# Patient Record
Sex: MALE | Race: BLACK OR AFRICAN AMERICAN | Employment: FULL TIME | URBAN - METROPOLITAN AREA
[De-identification: names, ages, dates, MRNs, and addresses within clinical notes are randomized per-mention and may not be internally consistent; named-entity substitution may affect disease eponyms.]

---

## 2018-05-31 ENCOUNTER — OFFICE VISIT (OUTPATIENT)
Dept: FAMILY MEDICINE CLINIC | Age: 33
End: 2018-05-31

## 2018-05-31 VITALS
HEART RATE: 77 BPM | SYSTOLIC BLOOD PRESSURE: 121 MMHG | TEMPERATURE: 98.3 F | WEIGHT: 168 LBS | OXYGEN SATURATION: 99 % | HEIGHT: 75 IN | BODY MASS INDEX: 20.89 KG/M2 | DIASTOLIC BLOOD PRESSURE: 82 MMHG | RESPIRATION RATE: 16 BRPM

## 2018-05-31 DIAGNOSIS — Z00.00 REGULAR CHECK-UP: Primary | ICD-10-CM

## 2018-05-31 DIAGNOSIS — E10.9 TYPE 1 DIABETES MELLITUS WITHOUT COMPLICATION (HCC): ICD-10-CM

## 2018-05-31 DIAGNOSIS — H61.22 IMPACTED CERUMEN OF LEFT EAR: ICD-10-CM

## 2018-05-31 LAB — GLUCOSE POC: 241 MG/DL

## 2018-05-31 NOTE — MR AVS SNAPSHOT
[facility] [address] 
[phone] Patient: Chester Wheeler MRN: [id number] Visit Information Date & Time Provider Department Dept. Phone Encounter #  
 5/31/2018 11:00 AM Lorrie Ro  Femi  [id number] Follow-up Instructions Return in about 2 months (around 7/31/2018) for Diabetes. Your Appointments 6/7/2018  1:15 PM  
Nurse Visit with NURSE_ARMANDO_HR Andrew Humph[facility] Appt Note: [facility] [place]  
  
   
 [facility] [address] Upcoming Health Maintenance Date Due DTaP/Tdap/Td series (1 - Tdap) 5/6/2004 Influenza Age 5 to Adult 8/1/2018 Allergies as of 5/31/2018  Review Complete On: 5/31/2018 By: Melinda Hinton No Known Allergies Current Immunizations  Never Reviewed No immunizations on file. Not reviewed this visit You Were Diagnosed With   
  
 Codes Comments Regular check-up    -  Primary ICD-10-CM: Z00.00 ICD-9-CM: V70.0 Type 1 diabetes mellitus without complication (HCC)     MAW-66-SO: E10.9 ICD-9-CM: 250.01 Impacted cerumen of left ear     ICD-10-CM: H61.22 
ICD-9-CM: 380.4 Vitals BP Pulse Temp Resp Height(growth percentile) Weight(growth percentile) 121/82 (BP 1 Location: Left arm, BP Patient Position: Sitting) 77 98.3 °F (36.8 °C) (Oral) 16 6' 3\" (1.905 m) 168 lb (76.2 kg) SpO2 BMI Smoking Status 99% 21 kg/m2 Current Every Day Smoker Vitals History BMI and BSA Data Body Mass Index Body Surface Area  
 21 kg/m 2 2.01 m 2 Your Updated Medication List  
  
   
This list is accurate as of 5/31/18 11:43 AM.  Always use your most recent med list.  
  
  
  
  
 carbamide peroxide 6.5 % otic solution Commonly known as:  [employer] Administer 5 Drops into each ear two (2) times a day. insulin  unit/mL injection Commonly known as:  NOVOLIN N, HUMULIN N  
by SubCUTAneous route once. NOVOLIN R INJECTION  
by Injection route. Prescriptions Printed Refills  
 carbamide peroxide (DEBROX) 6.5 % otic solution 1 Sig: Administer 5 Drops into each ear two (2) times a day. Class: Print Route: Both Ears We Performed the Following AMB POC GLUCOSE BLOOD, BY GLUCOSE MONITORING DEVICE [44647 CPT(R)] Follow-up Instructions Return in about 2 months (around 7/31/2018) for Diabetes. Introducing [facility] & HEALTH SERVICES! [facility] introduces [employer] patient portal. Now you can access parts of your medical record, email your doctor's office, and request medication refills online. 1. In your internet browser, go to [email or web address] 2. Click on the First Time User? Click Here link in the Sign In box. You will see the New Member Sign Up page. 3. Enter your [employer] Access Code exactly as it appears below. You will not need to use this code after youve completed the sign-up process. If you do not sign up before the expiration date, you must request a new code. · [employer] Access Code: [email or web address] Expires: 8/29/2018  9:25 AM 
 
4. Enter the last four digits of your Social Security Number (xxxx) and Date of Birth (mm/dd/yyyy) as indicated and click Submit. You will be taken to the next sign-up page. 5. Create a En[employer] ID. This will be your [employer] login ID and cannot be changed, so think of one that is secure and easy to remember. 6. Create a [employer] password. You can change your password at any time. 7. Enter your Password Reset Question and Answer. This can be used at a later time if you forget your password. 8. Enter your e-mail address. You will receive e-mail notification when new information is available in [address] 9. Click Sign Up. You can now view and download portions of your medical record. 10. Click the Download Summary menu link to download a portable copy of your medical information. If you have questions, please visit the Frequently Asked Questions section of the [employer] website. Remember, [employer] is NOT to be used for urgent needs. For medical emergencies, dial 911. Now available from your iPhone and Android! Please provide this summary of care documentation to your next provider. If you have any questions after today's visit, please call [phone].

## 2018-05-31 NOTE — PROGRESS NOTES
MARTHA Gill is a 26 y.o. male being seen today for   Chief Complaint   Patient presents with    Diabetes    Other     Pt stated his ear is clogged   . IOV for this pt with type one DM. He recently had a toe amputated and then got serious about his diabetes. His sugars have been great lately. rangin from . he states that he would be interested in getting back on lantus for diabetes and interested in rx program to defray costs. Also his left ear feels clogged. Past Medical History:   Diagnosis Date    Diabetes ([address] 1998         ROS  Patient states that he is feeling well. Denies complaints of chest pain, shortness of breath, swelling of legs, dizziness or weakness. he denies nausea, vomiting or diarrhea. Current Outpatient Prescriptions   Medication Sig    insulin regular, human (NOVOLIN R INJECTION) by Injection route.  insulin NPH (NOVOLIN N, HUMULIN N) 100 unit/mL injection by SubCUTAneous route once.  carbamide peroxide (DEBROX) 6.5 % otic solution Administer 5 Drops into each ear two (2) times a day. No current facility-administered medications for this visit. PE  Visit Vitals    /82 (BP 1 Location: Left arm, BP Patient Position: Sitting)    Pulse 77    Temp 98.3 °F (36.8 °C) (Oral)    Resp 16    Ht 6' 3\" (1.905 m)    Wt 168 lb (76.2 kg)    SpO2 99%    BMI 21 kg/m2        Alert and oriented with normal mood and affect. he is well developed and well nourished . Lungs are clear without wheezing. Heart rate is regular without murmurs or gallops. There is no lower extremity edema. Bilateral ear canals with cerumen    Results for orders placed or performed in visit on 05/31/18   AMB POC GLUCOSE BLOOD, BY GLUCOSE MONITORING DEVICE   Result Value Ref Range    Glucose  mg/dL         Assessment and Plan:        ICD-10-CM ICD-9-CM    1. Regular check-up Z00.00 V70.0 AMB POC GLUCOSE BLOOD, BY GLUCOSE MONITORING DEVICE   2.  Type 1 diabetes mellitus without complication ([address] K38.9 250.01    3.  Impacted cerumen of left ear  Daily debrox for cerumen then rtc for ear irrigation if sx persist H61.22 380.4        Follow up 2 mos for DM visit with labs  DM classes  PAP for lantus 20 and humalog SSI    Pola Gracia MD

## 2018-05-31 NOTE — PROGRESS NOTES
After Visit Summary for this encounter has been printed and given to patient. I have reviewed discharge instructions with the patient. The patient verbalized understanding. Guidance given regarding new medication(s) this visit, including reason for taking medicine, common side effects, and pharmacy medication was sent to if not printed. Pharmacy Assistance Program Application given to patient during encounter. Patient need assistance with Lantus 100 unit/mL 20 units by SC route daily and Humalog 100 unit/mL 25 units by SC route daily. Patient will return application with income verification.

## 2018-06-07 ENCOUNTER — CLINICAL SUPPORT (OUTPATIENT)
Dept: FAMILY MEDICINE CLINIC | Age: 33
End: 2018-06-07

## 2018-06-07 DIAGNOSIS — H61.22 IMPACTED CERUMEN OF LEFT EAR: Primary | ICD-10-CM

## 2018-06-07 NOTE — MR AVS SNAPSHOT
[address] [address] 
[phone] Patient: Eulogio Lombardi MRN: [id number] Visit Information Date & Time Provider Department Dept. Phone Encounter #  
 2018  1:15 PM NURSE_CVAN_HR [employer] [phone] [id number] Upcoming Health Maintenance Date Due HEMOGLOBIN A1C Q6M 1983 LIPID PANEL Q1 1983 FOOT EXAM Q1 1993 MICROALBUMIN Q1 1993 EYE EXAM RETINAL OR DILATED Q1 1993 Pneumococcal 19-64 Medium Risk (1 of 1 - PPSV23) 2002 DTaP/Tdap/Td series (1 - Tdap) 2004 Influenza Age 5 to Adult 2018 Allergies as of 2018  Review Complete On: 2018 By: Estelle Emmanuel No Known Allergies Current Immunizations  Never Reviewed No immunizations on file. Not reviewed this visit Vitals Smoking Status Current Every Day Smoker Your Updated Medication List  
  
   
This list is accurate as of 18  1:46 PM.  Always use your most recent med list.  
  
  
  
  
 carbamide peroxide 6.5 % otic solution Commonly known as:  [employer] Administer 5 Drops into each ear two (2) times a day. insulin  unit/mL injection Commonly known as:  NOVOLIN N, HUMULIN N  
by SubCUTAneous route once. NOVOLIN R INJECTION  
by Injection route. Patient Instructions Apply for [facility] by [phone] Introducing [facility] & HEALTH SERVICES! [employer] introduces [employer] patient portal. Now you can access parts of your medical record, email your doctor's office, and request medication refills online. 1. In your internet browser, go to [email or web address] [email or web address]/[employer] 2. Click on the First Time User? Click Here link in the Sign In box. You will see the New Member Sign Up page. 3. Enter your [employer] Access Code exactly as it appears below.  You will not need to use this code after youve completed the sign-up process. If you do not sign up before the expiration date, you must request a new code. · [employer] Access Code: [email or web address] Expires: 8/29/2018  9:25 AM 
 
4. Enter the last four digits of your Social Security Number (xxxx) and Date of Birth (mm/dd/yyyy) as indicated and click Submit. You will be taken to the next sign-up page. 5. Create a [employer] Online ID. This will be your [employer] Online login ID and cannot be changed, so think of one that is secure and easy to remember. 6. Create a [employer] Online password. You can change your password at any time. 7. Enter your Password Reset Question and Answer. This can be used at a later time if you forget your password. 8. Enter your e-mail address. You will receive e-mail notification when new information is available in [address] 9. Click Sign Up. You can now view and download portions of your medical record. 10. Click the Download Summary menu link to download a portable copy of your medical information. If you have questions, please visit the Frequently Asked Questions section of the [employer] website. Remember, [employer] is NOT to be used for urgent needs. For medical emergencies, dial 911. Now available from your iPhone and Android! Please provide this summary of care documentation to your next provider. If you have any questions after today's visit, please call [phone].

## 2018-06-07 NOTE — PATIENT INSTRUCTIONS
[facility] by [phone]

## 2018-08-16 ENCOUNTER — APPOINTMENT (OUTPATIENT)
Dept: GENERAL RADIOLOGY | Age: 33
DRG: 853 | End: 2018-08-16
Attending: EMERGENCY MEDICINE
Payer: SELF-PAY

## 2018-08-16 ENCOUNTER — HOSPITAL ENCOUNTER (INPATIENT)
Age: 33
LOS: 7 days | Discharge: HOME | DRG: 853 | End: 2018-08-23
Attending: EMERGENCY MEDICINE | Admitting: HOSPITALIST
Payer: SELF-PAY

## 2018-08-16 DIAGNOSIS — L03.115 CELLULITIS OF RIGHT FOOT: ICD-10-CM

## 2018-08-16 DIAGNOSIS — I96 GANGRENE OF TOE OF RIGHT FOOT (HCC): Primary | ICD-10-CM

## 2018-08-16 PROBLEM — M86.9 OSTEOMYELITIS (HCC): Status: ACTIVE | Noted: 2018-08-16

## 2018-08-16 LAB
ALBUMIN SERPL-MCNC: 2.7 G/DL (ref 3.4–5)
ALBUMIN/GLOB SERPL: 0.6 {RATIO} (ref 0.8–1.7)
ALP SERPL-CCNC: 170 U/L (ref 45–117)
ALT SERPL-CCNC: 19 U/L (ref 16–61)
ANION GAP SERPL CALC-SCNC: 12 MMOL/L (ref 3–18)
AST SERPL-CCNC: 20 U/L (ref 15–37)
BASOPHILS # BLD: 0.1 K/UL (ref 0–0.1)
BASOPHILS NFR BLD: 0 % (ref 0–2)
BILIRUB SERPL-MCNC: 0.5 MG/DL (ref 0.2–1)
BUN SERPL-MCNC: 10 MG/DL (ref 7–18)
BUN/CREAT SERPL: 9 (ref 12–20)
CALCIUM SERPL-MCNC: 8.3 MG/DL (ref 8.5–10.1)
CHLORIDE SERPL-SCNC: 99 MMOL/L (ref 100–108)
CO2 SERPL-SCNC: 25 MMOL/L (ref 21–32)
CREAT SERPL-MCNC: 1.09 MG/DL (ref 0.6–1.3)
DIFFERENTIAL METHOD BLD: ABNORMAL
EOSINOPHIL # BLD: 0 K/UL (ref 0–0.4)
EOSINOPHIL NFR BLD: 0 % (ref 0–5)
ERYTHROCYTE [DISTWIDTH] IN BLOOD BY AUTOMATED COUNT: 12.7 % (ref 11.6–14.5)
EST. AVERAGE GLUCOSE BLD GHB EST-MCNC: 269 MG/DL
GLOBULIN SER CALC-MCNC: 4.8 G/DL (ref 2–4)
GLUCOSE BLD STRIP.AUTO-MCNC: 279 MG/DL (ref 70–110)
GLUCOSE SERPL-MCNC: 267 MG/DL (ref 74–99)
HBA1C MFR BLD: 11 % (ref 4.2–5.6)
HCT VFR BLD AUTO: 33.7 % (ref 36–48)
HGB BLD-MCNC: 11.6 G/DL (ref 13–16)
LACTATE BLD-SCNC: 0.9 MMOL/L (ref 0.4–2)
LYMPHOCYTES # BLD: 3 K/UL (ref 0.9–3.6)
LYMPHOCYTES NFR BLD: 9 % (ref 21–52)
MCH RBC QN AUTO: 28 PG (ref 24–34)
MCHC RBC AUTO-ENTMCNC: 34.4 G/DL (ref 31–37)
MCV RBC AUTO: 81.4 FL (ref 74–97)
MONOCYTES # BLD: 2.8 K/UL (ref 0.05–1.2)
MONOCYTES NFR BLD: 9 % (ref 3–10)
NEUTS SEG # BLD: 26.6 K/UL (ref 1.8–8)
NEUTS SEG NFR BLD: 82 % (ref 40–73)
PLATELET # BLD AUTO: 430 K/UL (ref 135–420)
PMV BLD AUTO: 9.8 FL (ref 9.2–11.8)
POTASSIUM SERPL-SCNC: 3.8 MMOL/L (ref 3.5–5.5)
PROT SERPL-MCNC: 7.5 G/DL (ref 6.4–8.2)
RBC # BLD AUTO: 4.14 M/UL (ref 4.7–5.5)
SODIUM SERPL-SCNC: 136 MMOL/L (ref 136–145)
WBC # BLD AUTO: 32.5 K/UL (ref 4.6–13.2)

## 2018-08-16 PROCEDURE — 94762 N-INVAS EAR/PLS OXIMTRY CONT: CPT

## 2018-08-16 PROCEDURE — 74011000258 HC RX REV CODE- 258: Performed by: EMERGENCY MEDICINE

## 2018-08-16 PROCEDURE — 74011250636 HC RX REV CODE- 250/636: Performed by: EMERGENCY MEDICINE

## 2018-08-16 PROCEDURE — 83605 ASSAY OF LACTIC ACID: CPT

## 2018-08-16 PROCEDURE — 80053 COMPREHEN METABOLIC PANEL: CPT | Performed by: EMERGENCY MEDICINE

## 2018-08-16 PROCEDURE — 87040 BLOOD CULTURE FOR BACTERIA: CPT | Performed by: EMERGENCY MEDICINE

## 2018-08-16 PROCEDURE — 99285 EMERGENCY DEPT VISIT HI MDM: CPT

## 2018-08-16 PROCEDURE — 71045 X-RAY EXAM CHEST 1 VIEW: CPT

## 2018-08-16 PROCEDURE — 74011636637 HC RX REV CODE- 636/637: Performed by: HOSPITALIST

## 2018-08-16 PROCEDURE — 65270000029 HC RM PRIVATE

## 2018-08-16 PROCEDURE — 96375 TX/PRO/DX INJ NEW DRUG ADDON: CPT

## 2018-08-16 PROCEDURE — 85025 COMPLETE CBC W/AUTO DIFF WBC: CPT | Performed by: EMERGENCY MEDICINE

## 2018-08-16 PROCEDURE — 83036 HEMOGLOBIN GLYCOSYLATED A1C: CPT | Performed by: HOSPITALIST

## 2018-08-16 PROCEDURE — 82962 GLUCOSE BLOOD TEST: CPT

## 2018-08-16 PROCEDURE — 96365 THER/PROPH/DIAG IV INF INIT: CPT

## 2018-08-16 PROCEDURE — 73630 X-RAY EXAM OF FOOT: CPT

## 2018-08-16 RX ORDER — SODIUM CHLORIDE 0.9 % (FLUSH) 0.9 %
5-10 SYRINGE (ML) INJECTION AS NEEDED
Status: DISCONTINUED | OUTPATIENT
Start: 2018-08-16 | End: 2018-08-23 | Stop reason: HOSPADM

## 2018-08-16 RX ORDER — DEXTROSE 50 % IN WATER (D50W) INTRAVENOUS SYRINGE
25-50 AS NEEDED
Status: DISCONTINUED | OUTPATIENT
Start: 2018-08-16 | End: 2018-08-23 | Stop reason: HOSPADM

## 2018-08-16 RX ORDER — ONDANSETRON 2 MG/ML
4 INJECTION INTRAMUSCULAR; INTRAVENOUS
Status: COMPLETED | OUTPATIENT
Start: 2018-08-16 | End: 2018-08-16

## 2018-08-16 RX ORDER — KETOROLAC TROMETHAMINE 30 MG/ML
30 INJECTION, SOLUTION INTRAMUSCULAR; INTRAVENOUS
Status: COMPLETED | OUTPATIENT
Start: 2018-08-16 | End: 2018-08-16

## 2018-08-16 RX ORDER — VANCOMYCIN 2 GRAM/500 ML IN 0.9 % SODIUM CHLORIDE INTRAVENOUS
2000 ONCE
Status: COMPLETED | OUTPATIENT
Start: 2018-08-16 | End: 2018-08-19

## 2018-08-16 RX ORDER — INSULIN LISPRO 100 [IU]/ML
INJECTION, SOLUTION INTRAVENOUS; SUBCUTANEOUS
Status: DISCONTINUED | OUTPATIENT
Start: 2018-08-16 | End: 2018-08-19

## 2018-08-16 RX ORDER — MAGNESIUM SULFATE 100 %
4 CRYSTALS MISCELLANEOUS AS NEEDED
Status: DISCONTINUED | OUTPATIENT
Start: 2018-08-16 | End: 2018-08-23 | Stop reason: HOSPADM

## 2018-08-16 RX ORDER — MORPHINE SULFATE 10 MG/ML
10 INJECTION, SOLUTION INTRAMUSCULAR; INTRAVENOUS
Status: COMPLETED | OUTPATIENT
Start: 2018-08-16 | End: 2018-08-16

## 2018-08-16 RX ADMIN — INSULIN LISPRO 6 UNITS: 100 INJECTION, SOLUTION INTRAVENOUS; SUBCUTANEOUS at 22:51

## 2018-08-16 RX ADMIN — ONDANSETRON 4 MG: 2 INJECTION, SOLUTION INTRAMUSCULAR; INTRAVENOUS at 20:05

## 2018-08-16 RX ADMIN — SODIUM CHLORIDE 274 ML: 900 INJECTION, SOLUTION INTRAVENOUS at 20:14

## 2018-08-16 RX ADMIN — KETOROLAC TROMETHAMINE 30 MG: 30 INJECTION, SOLUTION INTRAMUSCULAR at 20:03

## 2018-08-16 RX ADMIN — SODIUM CHLORIDE 1000 ML: 900 INJECTION, SOLUTION INTRAVENOUS at 19:49

## 2018-08-16 RX ADMIN — MORPHINE SULFATE 10 MG: 10 INJECTION INTRAMUSCULAR; INTRAVENOUS; SUBCUTANEOUS at 20:06

## 2018-08-16 RX ADMIN — VANCOMYCIN HYDROCHLORIDE 2000 MG: 10 INJECTION, POWDER, LYOPHILIZED, FOR SOLUTION INTRAVENOUS at 20:08

## 2018-08-16 RX ADMIN — SODIUM CHLORIDE 1000 ML: 900 INJECTION, SOLUTION INTRAVENOUS at 20:05

## 2018-08-16 RX ADMIN — PIPERACILLIN SODIUM,TAZOBACTAM SODIUM 4.5 G: 4; .5 INJECTION, POWDER, FOR SOLUTION INTRAVENOUS at 20:02

## 2018-08-16 NOTE — IP AVS SNAPSHOT
[address] [address] 
[phone] Patient: Beth Miles MRN: [id number] YQU:[id number] A check bria indicates which time of day the medication should be taken. My Medications START taking these medications Instructions Each Dose to Equal  
 Morning Noon Evening Bedtime  
 amoxicillin-clavulanate 875-125 mg per tablet Commonly known as:  AUGMENTIN Your next dose is: Tonight Take 1 Tab by mouth two (2) times daily (with meals) for 14 doses. 1 Tab  
    
  
   
   
   
  
 insulin glargine 100 unit/mL injection Commonly known as:  LANTUS Your next dose is: Tonight 12 Units by SubCUTAneous route two (2) times a day. 12 Units  
    
  
   
   
   
  
 levoFLOXacin 750 mg tablet Commonly known as:  Joaquina Born Your next dose is:  Tomorrow Take 1 Tab by mouth every twenty-four (24) hours for 7 doses. 750 mg  
    
  
   
   
   
  
 mupirocin 2 % ointment Commonly known as:  [facility] Apply  to affected area every fourty-eight (48) hours. Apply to incision every 2-3 days. oxyCODONE IR 5 mg immediate release tablet Commonly known as:  Enoch Puga Take 1-2 Tabs by mouth every four (4) hours as needed. Max Daily Amount: 60 mg.  
 5-10 mg CONTINUE taking these medications Instructions Each Dose to Equal  
 Morning Noon Evening Bedtime  
 insulin  unit/mL injection Commonly known as:  Aretha Escobedo Your next dose is:  As scheduled at home 
  
   
 by SubCUTAneous route once. NOVOLIN R INJECTION  
   
 by Injection route. Where to Get Your Medications Information on where to get these meds will be given to you by the nurse or doctor. ! Ask your nurse or doctor about these medications  
  amoxicillin-clavulanate 875-125 mg per tablet insulin glargine 100 unit/mL injection  
 levoFLOXacin 750 mg tablet  
 mupirocin 2 % ointment  
 oxyCODONE IR 5 mg immediate release tablet

## 2018-08-16 NOTE — IP AVS SNAPSHOT
[address] [address] 
[phone] Patient: Luis Antonio Dao MRN: [id number] HBO:[id number] About your hospitalization You were admitted on:  August 16, 2018 You last received care in the:  [address] [place] [address] You were discharged on:  August 23, 2018 Why you were hospitalized Your primary diagnosis was:  Osteomyelitis (Hcc) Your diagnoses also included:  Necrotizing Fasciitis Due To Microorganism (Hcc), Gangrene Of Toe (Hcc) Follow-up Information Follow up With Details Comments Contact Info Meghana Mtz MD Schedule an appointment as soon as possible for a visit in 1 week For follow-up [facility] [id number] 
[phone] Adri Ratliff DPM Schedule an appointment as soon as possible for a visit in 1 week For follow-up [address] [facility] [id number] 
[phone] Discharge Orders None A check bria indicates which time of day the medication should be taken. My Medications START taking these medications Instructions Each Dose to Equal  
 Morning Noon Evening Bedtime  
 amoxicillin-clavulanate 875-125 mg per tablet Commonly known as:  AUGMENTIN Your next dose is: Tonight Take 1 Tab by mouth two (2) times daily (with meals) for 14 doses. 1 Tab  
    
  
   
   
   
  
 insulin glargine 100 unit/mL injection Commonly known as:  LANTUS Your next dose is: Tonight 12 Units by SubCUTAneous route two (2) times a day. 12 Units  
    
  
   
   
   
  
 levoFLOXacin 750 mg tablet Commonly known as:  Jb Hill Your next dose is:  Tomorrow Take 1 Tab by mouth every twenty-four (24) hours for 7 doses. 750 mg  
    
  
   
   
   
  
 mupirocin 2 % ointment Commonly known as:  [facility] Apply  to affected area every fourty-eight (48) hours. Apply to incision every 2-3 days. oxyCODONE IR 5 mg immediate release tablet Commonly known as:  Encoh Puga Take 1-2 Tabs by mouth every four (4) hours as needed. Max Daily Amount: 60 mg.  
 5-10 mg CONTINUE taking these medications Instructions Each Dose to Equal  
 Morning Noon Evening Bedtime  
 insulin  unit/mL injection Commonly known as:  Aretha Escobedo Your next dose is:  As scheduled at home 
  
   
 by SubCUTAneous route once. NOVOLIN R INJECTION  
   
 by Injection route. Where to Get Your Medications Information on where to get these meds will be given to you by the nurse or doctor. ! Ask your nurse or doctor about these medications  
  amoxicillin-clavulanate 875-125 mg per tablet  
 insulin glargine 100 unit/mL injection  
 levoFLOXacin 750 mg tablet  
 mupirocin 2 % ointment  
 oxyCODONE IR 5 mg immediate release tablet Opioid Education Prescription Opioids: What You Need to Know: 
 
Prescription opioids can be used to help relieve moderate-to-severe pain and are often prescribed following a surgery or injury, or for certain health conditions. These medications can be an important part of treatment but also come with serious risks. Opioids are strong pain medicines. Examples include hydrocodone, oxycodone, fentanyl, and morphine. Heroin is an example of an illegal opioid. It is important to work with your health care provider to make sure you are getting the safest, most effective care. WHAT ARE THE RISKS AND SIDE EFFECTS OF OPIOID USE? Prescription opioids carry serious risks of addiction and overdose, especially with prolonged use. An opioid overdose, often marked by slow breathing, can cause sudden death. The use of prescription opioids can have a number of side effects as well, even when taken as directed. · Tolerance-meaning you might need to take more of a medication for the same pain relief · Physical dependence-meaning you have symptoms of withdrawal when the medication is stopped. Withdrawal symptoms can include nausea, sweating, chills, diarrhea, stomach cramps, and muscle aches. Withdrawal can last up to several weeks, depending on which drug you took and how long you took it. · Increased sensitivity to pain · Constipation · Nausea, vomiting, and dry mouth · Sleepiness and dizziness · Confusion · Depression · Low levels of testosterone that can result in lower sex drive, energy, and strength · Itching and sweating RISKS ARE GREATER WITH:      
· History of drug misuse, substance use disorder, or overdose · Mental health conditions (such as depression or anxiety) · Sleep apnea · Older age (72 years or older) · Pregnancy Avoid alcohol while taking prescription opioids. Also, unless specifically advised by your health care provider, medications to avoid include: · Benzodiazepines (such as Xanax or Valium) · Muscle relaxants (such as Soma or Flexeril) · Hypnotics (such as Ambien or Lunesta) · Other prescription opioids KNOW YOUR OPTIONS Talk to your health care provider about ways to manage your pain that don't involve prescription opioids. Some of these options may actually work better and have fewer risks and side effects. Options may include: 
· Pain relievers such as acetaminophen, ibuprofen, and naproxen · Some medications that are also used for depression or seizures · Physical therapy and exercise · Counseling to help patients learn how to cope better with triggers of pain and stress. · Application of heat or cold compress · Massage therapy · Relaxation techniques Be Informed Make sure you know the name of your medication, how much and how often to take it, and its potential risks & side effects.  
 
IF YOU ARE PRESCRIBED OPIOIDS FOR PAIN: 
 · Never take opioids in greater amounts or more often than prescribed. Remember the goal is not to be pain-free but to manage your pain at a tolerable level. · Follow up with your primary care provider to: · Work together to create a plan on how to manage your pain. · Talk about ways to help manage your pain that don't involve prescription opioids. · Talk about any and all concerns and side effects. · Help prevent misuse and abuse. · Never sell or share prescription opioids · Help prevent misuse and abuse. · Store prescription opioids in a secure place and out of reach of others (this may include visitors, children, friends, and family). · Safely dispose of unused/unwanted prescription opioids: Find your community drug take-back program or your pharmacy mail-back program, or flush them down the toilet, following guidance from the Food and Drug Administration (www.fda.gov/Drugs/ResourcesForYou). · Visit www.cdc.gov/drugoverdose to learn about the risks of opioid abuse and overdose. · If you believe you may be struggling with addiction, tell your health care provider and ask for guidance or call [address] [place], [place] [address] at [phone]. Discharge Instructions DISCHARGE SUMMARY from Nurse PATIENT INSTRUCTIONS: 
 
 
F-face looks uneven A-arms unable to move or move unevenly S-speech slurred or non-existent T-time-call 911 as soon as signs and symptoms begin-DO NOT go Back to bed or wait to see if you get better-TIME IS BRAIN. Warning Signs of HEART ATTACK Call 911 if you have these symptoms: ? Chest discomfort. Most heart attacks involve discomfort in the center of the chest that lasts more than a few minutes, or that goes away and comes back. It can feel like uncomfortable pressure, squeezing, fullness, or pain. ? Discomfort in other areas of the upper body. Symptoms can include pain or discomfort in one or both arms, the back, neck, jaw, or stomach. ? Shortness of breath with or without chest discomfort. ? Other signs may include breaking out in a cold sweat, nausea, or lightheadedness. Don't wait more than five minutes to call [address]! Fast action can save your life. Calling 911 is almost always the fastest way to get lifesaving treatment. Emergency Medical Services staff can begin treatment when they arrive  up to an hour sooner than if someone gets to the hospital by car. The discharge information has been reviewed with the patient. The patient verbalized understanding. Discharge medications reviewed with the patient and appropriate educational materials and side effects teaching were provided. ___________________________________________________________________________________________________________________________________ Diabetes Foot Health: Care Instructions Your Care Instructions When you have diabetes, your feet need extra care and attention. Diabetes can damage the nerve endings and blood vessels in your feet, making you less likely to notice when your feet are injured. Diabetes also limits your body's ability to fight infection and get blood to areas that need it. If you get a minor foot injury, it could become an ulcer or a serious infection. With good foot care, you can prevent most of these problems. Caring for your feet can be quick and easy. Most of the care can be done when you are bathing or getting ready for bed. Follow-up care is a key part of your treatment and safety.  Be sure to make and go to all appointments, and call your doctor if you are having problems. It's also a good idea to know your test results and keep a list of the medicines you take. How can you care for yourself at home? · Keep your blood sugar close to normal by watching what and how much you eat, monitoring blood sugar, taking medicines if prescribed, and getting regular exercise. · Do not smoke. Smoking affects blood flow and can make foot problems worse. If you need help quitting, talk to your doctor about stop-smoking programs and medicines. These can increase your chances of quitting for good. · Eat a diet that is low in fats. High fat intake can cause fat to build up in your blood vessels and decrease blood flow. · Inspect your feet daily for blisters, cuts, cracks, or sores. If you cannot see well, use a mirror or have someone help you. · Take care of your feet: 
[facility] AUTHORITY your feet every day. Use warm (not hot) water. Check the water temperature with your wrists or other part of your body, not your feet. ¨ Dry your feet well. Pat them dry. Do not rub the skin on your feet too hard. Dry well between your toes. If the skin on your feet stays moist, bacteria or a fungus can grow, which can lead to infection. ¨ Keep your skin soft. Use moisturizing skin cream to keep the skin on your feet soft and prevent calluses and cracks. But do not put the cream between your toes, and stop using any cream that causes a rash. ¨ Clean underneath your toenails carefully. Do not use a sharp object to clean underneath your toenails. Use the blunt end of a nail file or other rounded tool. ¨ Trim and file your toenails straight across to prevent ingrown toenails. Use a nail clipper, not scissors. Use an emery board to smooth the edges. · Change socks daily. Socks without seams are best, because seams often rub the feet. You can find socks for people with diabetes from specialty catalogs. · Look inside your shoes every day for things like gravel or torn linings, which could cause blisters or sores. · Buy shoes that fit well: 
¨ Look for shoes that have plenty of space around the toes. This helps prevent bunions and blisters. ¨ Try on shoes while wearing the kind of socks you will usually wear with the shoes. ¨ Avoid plastic shoes. They may rub your feet and cause blisters. Good shoes should be made of materials that are flexible and breathable, such as leather or cloth. ¨ Break in new shoes slowly by wearing them for no more than an hour a day for several days. Take extra time to check your feet for red areas, blisters, or other problems after you wear new shoes. · Do not go barefoot. Do not wear sandals, and do not wear shoes with very thin soles. Thin soles are easy to puncture. They also do not protect your feet from hot pavement or cold weather. · Have your doctor check your feet during each visit. If you have a foot problem, see your doctor. Do not try to treat an early foot problem at home. Home remedies or treatments that you can buy without a prescription (such as corn removers) can be harmful. · Always get early treatment for foot problems. A minor irritation can lead to a major problem if not properly cared for early. When should you call for help? Call your doctor now or seek immediate medical care if: 
  · You have a foot sore, an ulcer or break in the skin that is not healing after 4 days, bleeding corns or calluses, or an ingrown toenail.  
  · You have blue or black areas, which can mean bruising or blood flow problems.  
  · You have peeling skin or tiny blisters between your toes or cracking or oozing of the skin.  
  · You have a fever for more than 24 hours and a foot sore.  
  · You have new numbness or tingling in your feet that does not go away after you move your feet or change positions.  
  · You have unexplained or unusual swelling of the foot or ankle.  Watch closely for changes in your health, and be sure to contact your doctor if: 
  · You cannot do proper foot care. Where can you learn more? Go to http://london-mckenna.info/. Enter A739 in the search box to learn more about \"Diabetes Foot Health: Care Instructions. \" Current as of: December 7, 2017 Content Version: 11.7 © 3471-2717 [employer]. Care instructions adapted under license by [employer] (which disclaims liability or warranty for this information). If you have questions about a medical condition or this instruction, always ask your healthcare professional. [address] [address] any warranty or liability for your use of this information. [employer] Announcement We are excited to announce that we are making your provider's discharge notes available to you in [employer]. You will see these notes when they are completed and signed by the physician that discharged you from your recent hospital stay. If you have any questions or concerns about any information you see in [employer], please call the Health Information Department where you were seen or reach out to your Primary Care Provider for more information about your plan of care. Introducing [facility]! Rosemary Mckeon introduces [employer] patient portal. Now you can access parts of your medical record, email your doctor's office, and request medication refills online. 1. In your internet browser, go to [email or web address]://[employer]. [employer] 2. Click on the First Time User? Click Here link in the Sign In box. You will see the New Member Sign Up page. 3. Enter your [employer] Access Code exactly as it appears below. You will not need to use this code after youve completed the sign-up process. If you do not sign up before the expiration date, you must request a new code. · [employer] Access Code: [email or web address] Expires: 8/29/2018  9:25 AM 
 
 4. Enter the last four digits of your Social Security Number (xxxx) and Date of Birth (mm/dd/yyyy) as indicated and click Submit. You will be taken to the next sign-up page. 5. Create a Rent.com ID. This will be your Rent[employer] login ID and cannot be changed, so think of one that is secure and easy to remember. 6. Create a [employer] password. You can change your password at any time. 7. Enter your Password Reset Question and Answer. This can be used at a later time if you forget your password. 8. Enter your e-mail address. You will receive e-mail notification when new information is available in [address] 9. Click Sign Up. You can now view and download portions of your medical record. 10. Click the Download Summary menu link to download a portable copy of your medical information. If you have questions, please visit the Frequently Asked Questions section of the [employer] website. Remember, [employer]com is NOT to be used for urgent needs. For medical emergencies, dial 911. Now available from your iPhone and Android! Introducing [employer] As a [facility] patient, I wanted to make you aware of our electronic visit tool called [employer]. [facility] 24/7 allows you to connect within minutes with a medical provider 24 hours a day, seven days a week via a mobile device or tablet or logging into a secure website from your computer. You can access Best Mikealecfin from anywhere in the [place]. A virtual visit might be right for you when you have a simple condition and feel like you just dont want to get out of bed, or cant get away from work for an appointment, when your regular [facility] provider is not available (evenings, weekends or holidays), or when youre out of town and need minor care.   Electronic visits cost only $49 and if the [employer] Mikemalachi provider determines a prescription is needed to treat your condition, one can be electronically transmitted to a nearby pharmacy*. Please take a moment to enroll today if you have not already done so. The enrollment process is free and takes just a few minutes. To enroll, please download the [employer] 24/7 tommie to your tablet or phone, or visit [email or web address] to enroll on your computer. And, as an [address] [place] [address] patient with a [employer] account, the results of your visits will be scanned into your electronic medical record and your primary care provider will be able to view the scanned results. We urge you to continue to see your regular [employer] provider for your ongoing medical care. And while your primary care provider may not be the one available when you seek a WAKU WAKU ????alecfin virtual visit, the peace of mind you get from getting a real diagnosis real time can be priceless. For more information on WAKU WAKU ????alecfin, view our Frequently Asked Questions (FAQs) at [email or web address]. Sincerely, 
 
Fredy Knox MD 
Chief Medical Officer [employer] *:  certain medications cannot be prescribed via WAKU WAKU ????alecfin Providers Seen During Your Hospitalization Provider Specialty Primary office phone Americo Mccoy MD Emergency Medicine [phone] Karel Soliz MD Family Practice [phone] Yane Messer DO Internal Medicine [phone] Your Primary Care Physician (PCP) Primary Care Physician Office Phone Office Fax Alyson [address]71, [address] [phone] You are allergic to the following No active allergies Recent Documentation Height Weight BMI Smoking Status 1.905 m 71.7 kg 19.75 kg/m2 Current Every Day Smoker Emergency Contacts Name Discharge Info Relation Home Work Mobile Jennifer St DISCHARGE CAREGIVER [3] Other Relative [6] [phone] Patient Belongings The following personal items are in your possession at time of discharge: 
  Dental Appliances: None  Visual Aid: None      Home Medications: Sent to pharmacy (gave to supervisor, insulin)   Jewelry: None  Clothing: Shirt, Pants    Other Valuables: Sudhir 1923, Cell Phone Discharge Instructions Attachments/References OSTEOMYELITIS (ENGLISH) OXYCODONE, RAPID RELEASE (BY MOUTH) (ENGLISH) Patient Handouts Osteomyelitis: Care Instructions Your Care Instructions Osteomyelitis (say \"lv-fdqu-ag-cv-rg-BO-tus\") is a bone infection. It is caused by bacteria. The bacteria can infect the bone where it has been injured, or they can be carried through the blood from another area in the body. Osteomyelitis can be a short- or long-term problem. It is treated with antibiotics. You may get the antibiotics as pills or through a needle in a vein (IV). You will probably get treatment in the hospital at first. The type of treatment depends on the type of bacteria causing the infection, the bones affected, and how bad the infection is. Sometimes people need surgery to drain pus from bone or to fix damaged bone. Short-term osteomyelitis that is treated right away usually can be cured. But the long-term form sometimes comes back after treatment. You can help your chances of stopping the infection by taking your medicines as directed. Follow-up care is a key part of your treatment and safety. Be sure to make and go to all appointments, and call your doctor if you are having problems. It's also a good idea to know your test results and keep a list of the medicines you take. How can you care for yourself at home? · Take your antibiotics as directed. Do not stop taking them just because you feel better. You need to take the full course of antibiotics. · Take pain medicines exactly as directed. ¨ If the doctor gave you a prescription medicine for pain, take it as prescribed. ¨ If you are not taking a prescription pain medicine, ask your doctor if you can take an over-the-counter medicine. · Do mild exercise and stretching if your doctor says it is okay. This can help keep your bones and muscles healthy. Avoid strenuous work or exercise until your doctor says you can do it. · Consider physical therapy if your doctor suggests it. Physical therapy may help you have a normal range of movement. · Do not smoke. Smoking can slow healing of the infection. If you need help quitting, talk to your doctor about stop-smoking programs and medicines. These can increase your chances of quitting for good. When should you call for help? Call 911 anytime you think you may need emergency care. For example, call if: 
  · You have severe bone pain.  
 Call your doctor now or seek immediate medical care if: 
  · You continue to have bone pain.  
  · You have signs of infection, such as: 
¨ Increased pain, swelling, warmth, or redness. ¨ Red streaks leading from a wound. ¨ Pus draining from a wound. ¨ A fever.  
 Watch closely for changes in your health, and be sure to contact your doctor if: 
  · You do not get better as expected. Where can you learn more? Go to http://london-mckenna.info/. Enter O839 in the search box to learn more about \"Osteomyelitis: Care Instructions. \" Current as of: November 21, 2017 Content Version: 11.7 © 5622-8241 [employer]. Care instructions adapted under license by [employer] (which disclaims liability or warranty for this information). If you have questions about a medical condition or this instruction, always ask your healthcare professional. [address] [place] [address] any warranty or liability for your use of this information. Oxycodone, Rapid Release (By mouth) Oxycodone Hydrochloride (yn-z-PCM-done christopher-droe-KLOR-ruslan) Treats moderate to severe pain. This medicine is a narcotic pain reliever. Brand Name(s): Oxaydo, Oxy IR, Roxicodone There may be other brand names for this medicine. When This Medicine Should Not Be Used: This medicine is not right for everyone. Do not use it if you had an allergic reaction to oxycodone, codeine, hydrocodone, dihydrocodeine, or morphine, or you have a stomach or bowel blockage. How to Use This Medicine:  
Capsule, Liquid, Tablet · Take your medicine as directed. Your dose may need to be changed several times to find what works best for you. · An overdose can be dangerous. Follow directions carefully so you do not get too much medicine at one time. · Oral liquid: Measure the oral liquid medicine with a marked measuring spoon, oral syringe, or medicine cup. · Oxaydo® tablet: Swallow it whole with enough water to swallow it completely. Do not break, crush, chew, or dissolve it. Do not wet the tablet before you put it in your mouth. · This medicine should come with a Medication Guide. Ask your pharmacist for a copy if you do not have one. · Missed dose: Take a dose as soon as you remember. If it is almost time for your next dose, wait until then and take a regular dose. Do not take extra medicine to make up for a missed dose. · Store the medicine in a closed container at room temperature, away from heat, moisture, and direct light. Store the medicine in a secure place to prevent others from getting it. Ask your pharmacist about the best way to dispose of medicine you do not use. Drugs and Foods to Avoid: Ask your doctor or pharmacist before using any other medicine, including over-the-counter medicines, vitamins, and herbal products. · Do not use this medicine if you are using or have used an MAO inhibitor within the past 14 days. · Some medicines can affect how oxycodone works. Tell your doctor if you are using any of the following: ¨ Amiodarone, carbamazepine, erythromycin, ketoconazole, phenytoin, quinidine, rifampin, ritonavir ¨ Diuretic (water pill) ¨ Medicine to treat depression or anxiety ¨ Medicine to treat migraine headaches ¨ Phenothiazine medicine · Tell your doctor if you use anything else that makes you sleepy. Some examples are allergy medicine, narcotic pain medicine, and alcohol. Tell your doctor if you are using buprenorphine, butorphanol, nalbuphine, pentazocine, or a muscle relaxer. · Do not drink alcohol while you are using this medicine. Warnings While Using This Medicine: · Tell your doctor if you are pregnant or breastfeeding, or if you have kidney disease, liver disease, heart disease, low blood pressure, lung disease or breathing problems (such as asthma, COPD), scoliosis, an enlarged prostate or trouble urinating, an underactive thyroid, [place] disease, gallbladder or pancreas problems, or digestion problems. Tell your doctor if you have a history of head injury, brain tumor, mental health problems, seizures, or alcohol or drug addiction. · This medicine may cause the following problems: 
¨ High risk of overdose, which can lead to death ¨ Respiratory depression (serious breathing problem that can be life-threatening) ¨ Serotonin syndrome, when used with certain medicines · This medicine may make you dizzy, drowsy, or faint. Do not drive or do anything else that could be dangerous until you know how this medicine affects you. Sit or lie down if you feel dizzy. Stand up carefully. · This medicine can be habit-forming. Do not use more than your prescribed dose. Call your doctor if you think your medicine is not working. · Do not stop using this medicine suddenly. Your doctor will need to slowly decrease your dose before you stop it completely. · This medicine may cause constipation, especially with long-term use. Ask your doctor if you should use a laxative to prevent and treat constipation. Drink plenty of liquids to help avoid constipation. · This medicine could cause infertility.  Talk with your doctor before using this medicine if you plan to have children. · Keep all medicine out of the reach of children. Never share your medicine with anyone. Possible Side Effects While Using This Medicine:  
Call your doctor right away if you notice any of these side effects: · Allergic reaction: Itching or hives, swelling in your face or hands, swelling or tingling in your mouth or throat, chest tightness, trouble breathing · Anxiety, restlessness, fast heartbeat, fever, sweating, muscle spasms, twitching, nausea, vomiting, diarrhea, seeing or hearing things that are not there · Blue lips, fingernails, or skin, trouble breathing · Extreme dizziness or weakness, shallow breathing, slow heartbeat, sweating, cold or clammy skin, seizures · Lightheadedness, dizziness, fainting · Severe constipation, stomach pain If you notice these less serious side effects, talk with your doctor: · Mild constipation · Sleepiness, tiredness If you notice other side effects that you think are caused by this medicine, tell your doctor. Call your doctor for medical advice about side effects. You may report side effects to FDA at 9-682-FDA-5769 © 2017 [address] Information is for End User's use only and may not be sold, redistributed or otherwise used for commercial purposes. The above information is an  only. It is not intended as medical advice for individual conditions or treatments. Talk to your doctor, nurse or pharmacist before following any medical regimen to see if it is safe and effective for you. Please provide this summary of care documentation to your next provider. Signatures-by signing, you are acknowledging that this After Visit Summary has been reviewed with you and you have received a copy. Patient Signature:  ____________________________________________________________  Date:  ____________________________________________________________  
  
Emmanuelle Manning    
 Provider Signature:  ____________________________________________________________ Date:  ____________________________________________________________

## 2018-08-16 NOTE — ED TRIAGE NOTES
Right foot swollen and painful x 2 weeks. Diabetic , infected pinky toe , black toe.  Has had others amputated

## 2018-08-17 ENCOUNTER — ANESTHESIA EVENT (OUTPATIENT)
Dept: SURGERY | Age: 33
DRG: 853 | End: 2018-08-17
Payer: SELF-PAY

## 2018-08-17 ENCOUNTER — ANESTHESIA (OUTPATIENT)
Dept: SURGERY | Age: 33
DRG: 853 | End: 2018-08-17
Payer: SELF-PAY

## 2018-08-17 LAB
ANION GAP SERPL CALC-SCNC: 12 MMOL/L (ref 3–18)
BASOPHILS # BLD: 0 K/UL (ref 0–0.1)
BASOPHILS NFR BLD: 0 % (ref 0–3)
BUN SERPL-MCNC: 11 MG/DL (ref 7–18)
BUN/CREAT SERPL: 10 (ref 12–20)
CALCIUM SERPL-MCNC: 8.4 MG/DL (ref 8.5–10.1)
CHLORIDE SERPL-SCNC: 97 MMOL/L (ref 100–108)
CO2 SERPL-SCNC: 25 MMOL/L (ref 21–32)
CREAT SERPL-MCNC: 1.14 MG/DL (ref 0.6–1.3)
DIFFERENTIAL METHOD BLD: ABNORMAL
EOSINOPHIL # BLD: 0 K/UL (ref 0–0.4)
EOSINOPHIL NFR BLD: 0 % (ref 0–5)
ERYTHROCYTE [DISTWIDTH] IN BLOOD BY AUTOMATED COUNT: 13.3 % (ref 11.6–14.5)
GLUCOSE BLD STRIP.AUTO-MCNC: 189 MG/DL (ref 70–110)
GLUCOSE BLD STRIP.AUTO-MCNC: 242 MG/DL (ref 70–110)
GLUCOSE BLD STRIP.AUTO-MCNC: 256 MG/DL (ref 70–110)
GLUCOSE BLD STRIP.AUTO-MCNC: 323 MG/DL (ref 70–110)
GLUCOSE BLD STRIP.AUTO-MCNC: 373 MG/DL (ref 70–110)
GLUCOSE SERPL-MCNC: 294 MG/DL (ref 74–99)
HCT VFR BLD AUTO: 35.3 % (ref 36–48)
HGB BLD-MCNC: 12.1 G/DL (ref 13–16)
LYMPHOCYTES # BLD: 3.4 K/UL (ref 0.8–3.5)
LYMPHOCYTES NFR BLD: 12 % (ref 20–51)
MCH RBC QN AUTO: 28.4 PG (ref 24–34)
MCHC RBC AUTO-ENTMCNC: 34.3 G/DL (ref 31–37)
MCV RBC AUTO: 82.9 FL (ref 74–97)
MONOCYTES # BLD: 3.4 K/UL (ref 0–1)
MONOCYTES NFR BLD: 12 % (ref 2–9)
NEUTS SEG # BLD: 21.5 K/UL (ref 1.8–8)
NEUTS SEG NFR BLD: 76 % (ref 42–75)
PLATELET # BLD AUTO: 484 K/UL (ref 135–420)
PMV BLD AUTO: 10.1 FL (ref 9.2–11.8)
POTASSIUM SERPL-SCNC: 4 MMOL/L (ref 3.5–5.5)
RBC # BLD AUTO: 4.26 M/UL (ref 4.7–5.5)
RBC MORPH BLD: ABNORMAL
SODIUM SERPL-SCNC: 134 MMOL/L (ref 136–145)
WBC # BLD AUTO: 28.3 K/UL (ref 4.6–13.2)

## 2018-08-17 PROCEDURE — 74011636637 HC RX REV CODE- 636/637: Performed by: HOSPITALIST

## 2018-08-17 PROCEDURE — 80048 BASIC METABOLIC PNL TOTAL CA: CPT | Performed by: HOSPITALIST

## 2018-08-17 PROCEDURE — 74011636637 HC RX REV CODE- 636/637: Performed by: NURSE ANESTHETIST, CERTIFIED REGISTERED

## 2018-08-17 PROCEDURE — 88311 DECALCIFY TISSUE: CPT | Performed by: PODIATRIST

## 2018-08-17 PROCEDURE — 87070 CULTURE OTHR SPECIMN AEROBIC: CPT | Performed by: PODIATRIST

## 2018-08-17 PROCEDURE — 77030019895 HC PCKNG STRP IODO -A: Performed by: PODIATRIST

## 2018-08-17 PROCEDURE — 77030013708 HC HNDPC SUC IRR PULS STRY –B: Performed by: PODIATRIST

## 2018-08-17 PROCEDURE — 88305 TISSUE EXAM BY PATHOLOGIST: CPT | Performed by: PODIATRIST

## 2018-08-17 PROCEDURE — 74011000250 HC RX REV CODE- 250: Performed by: PODIATRIST

## 2018-08-17 PROCEDURE — 74011250636 HC RX REV CODE- 250/636: Performed by: HOSPITALIST

## 2018-08-17 PROCEDURE — 0Y6X0Z0 DETACHMENT AT RIGHT 5TH TOE, COMPLETE, OPEN APPROACH: ICD-10-PCS | Performed by: PODIATRIST

## 2018-08-17 PROCEDURE — 85025 COMPLETE CBC W/AUTO DIFF WBC: CPT | Performed by: HOSPITALIST

## 2018-08-17 PROCEDURE — 87077 CULTURE AEROBIC IDENTIFY: CPT | Performed by: PODIATRIST

## 2018-08-17 PROCEDURE — 74011000272 HC RX REV CODE- 272: Performed by: PODIATRIST

## 2018-08-17 PROCEDURE — 74011250636 HC RX REV CODE- 250/636

## 2018-08-17 PROCEDURE — 65270000029 HC RM PRIVATE

## 2018-08-17 PROCEDURE — 82962 GLUCOSE BLOOD TEST: CPT

## 2018-08-17 PROCEDURE — 77030012510 HC MSK AIRWY LMA TELE -B: Performed by: ANESTHESIOLOGY

## 2018-08-17 PROCEDURE — 76060000032 HC ANESTHESIA 0.5 TO 1 HR: Performed by: PODIATRIST

## 2018-08-17 PROCEDURE — 36415 COLL VENOUS BLD VENIPUNCTURE: CPT | Performed by: HOSPITALIST

## 2018-08-17 PROCEDURE — 87076 CULTURE ANAEROBE IDENT EACH: CPT | Performed by: PODIATRIST

## 2018-08-17 PROCEDURE — 77030032490 HC SLV COMPR SCD KNE COVD -B: Performed by: PODIATRIST

## 2018-08-17 PROCEDURE — 87186 SC STD MICRODIL/AGAR DIL: CPT | Performed by: PODIATRIST

## 2018-08-17 PROCEDURE — 87075 CULTR BACTERIA EXCEPT BLOOD: CPT | Performed by: PODIATRIST

## 2018-08-17 PROCEDURE — 76210000006 HC OR PH I REC 0.5 TO 1 HR: Performed by: PODIATRIST

## 2018-08-17 PROCEDURE — 74011250637 HC RX REV CODE- 250/637: Performed by: HOSPITALIST

## 2018-08-17 PROCEDURE — 76010000138 HC OR TIME 0.5 TO 1 HR: Performed by: PODIATRIST

## 2018-08-17 PROCEDURE — 77030018836 HC SOL IRR NACL ICUM -A: Performed by: PODIATRIST

## 2018-08-17 PROCEDURE — 74011000258 HC RX REV CODE- 258: Performed by: HOSPITALIST

## 2018-08-17 RX ORDER — MORPHINE SULFATE 10 MG/ML
2 INJECTION, SOLUTION INTRAMUSCULAR; INTRAVENOUS
Status: DISCONTINUED | OUTPATIENT
Start: 2018-08-17 | End: 2018-08-17 | Stop reason: HOSPADM

## 2018-08-17 RX ORDER — LIDOCAINE HYDROCHLORIDE 10 MG/ML
0.1 INJECTION, SOLUTION EPIDURAL; INFILTRATION; INTRACAUDAL; PERINEURAL AS NEEDED
Status: DISCONTINUED | OUTPATIENT
Start: 2018-08-17 | End: 2018-08-17 | Stop reason: HOSPADM

## 2018-08-17 RX ORDER — MAGNESIUM SULFATE 100 %
4 CRYSTALS MISCELLANEOUS AS NEEDED
Status: DISCONTINUED | OUTPATIENT
Start: 2018-08-17 | End: 2018-08-17

## 2018-08-17 RX ORDER — HYDROCODONE BITARTRATE AND ACETAMINOPHEN 5; 325 MG/1; MG/1
1 TABLET ORAL ONCE
Status: DISCONTINUED | OUTPATIENT
Start: 2018-08-17 | End: 2018-08-17 | Stop reason: HOSPADM

## 2018-08-17 RX ORDER — LIDOCAINE HYDROCHLORIDE 20 MG/ML
INJECTION, SOLUTION EPIDURAL; INFILTRATION; INTRACAUDAL; PERINEURAL AS NEEDED
Status: DISCONTINUED | OUTPATIENT
Start: 2018-08-17 | End: 2018-08-17 | Stop reason: HOSPADM

## 2018-08-17 RX ORDER — MORPHINE SULFATE 2 MG/ML
2 INJECTION, SOLUTION INTRAMUSCULAR; INTRAVENOUS
Status: DISCONTINUED | OUTPATIENT
Start: 2018-08-17 | End: 2018-08-17

## 2018-08-17 RX ORDER — HYDROMORPHONE HYDROCHLORIDE 1 MG/ML
1 INJECTION, SOLUTION INTRAMUSCULAR; INTRAVENOUS; SUBCUTANEOUS
Status: DISCONTINUED | OUTPATIENT
Start: 2018-08-17 | End: 2018-08-23

## 2018-08-17 RX ORDER — DIPHENHYDRAMINE HYDROCHLORIDE 50 MG/ML
25 INJECTION, SOLUTION INTRAMUSCULAR; INTRAVENOUS
Status: DISCONTINUED | OUTPATIENT
Start: 2018-08-17 | End: 2018-08-17 | Stop reason: HOSPADM

## 2018-08-17 RX ORDER — BUPIVACAINE HYDROCHLORIDE 2.5 MG/ML
INJECTION, SOLUTION EPIDURAL; INFILTRATION; INTRACAUDAL AS NEEDED
Status: DISCONTINUED | OUTPATIENT
Start: 2018-08-17 | End: 2018-08-17 | Stop reason: HOSPADM

## 2018-08-17 RX ORDER — MIDAZOLAM HYDROCHLORIDE 1 MG/ML
INJECTION, SOLUTION INTRAMUSCULAR; INTRAVENOUS AS NEEDED
Status: DISCONTINUED | OUTPATIENT
Start: 2018-08-17 | End: 2018-08-17 | Stop reason: HOSPADM

## 2018-08-17 RX ORDER — SODIUM CHLORIDE, SODIUM LACTATE, POTASSIUM CHLORIDE, CALCIUM CHLORIDE 600; 310; 30; 20 MG/100ML; MG/100ML; MG/100ML; MG/100ML
INJECTION, SOLUTION INTRAVENOUS
Status: DISCONTINUED | OUTPATIENT
Start: 2018-08-17 | End: 2018-08-17 | Stop reason: HOSPADM

## 2018-08-17 RX ORDER — ONDANSETRON 2 MG/ML
INJECTION INTRAMUSCULAR; INTRAVENOUS AS NEEDED
Status: DISCONTINUED | OUTPATIENT
Start: 2018-08-17 | End: 2018-08-17 | Stop reason: HOSPADM

## 2018-08-17 RX ORDER — INSULIN LISPRO 100 [IU]/ML
INJECTION, SOLUTION INTRAVENOUS; SUBCUTANEOUS AS NEEDED
Status: DISCONTINUED | OUTPATIENT
Start: 2018-08-17 | End: 2018-08-17 | Stop reason: HOSPADM

## 2018-08-17 RX ORDER — OXYCODONE HYDROCHLORIDE 5 MG/1
5-10 TABLET ORAL
Status: DISCONTINUED | OUTPATIENT
Start: 2018-08-17 | End: 2018-08-23 | Stop reason: HOSPADM

## 2018-08-17 RX ORDER — FENTANYL CITRATE 50 UG/ML
50 INJECTION, SOLUTION INTRAMUSCULAR; INTRAVENOUS AS NEEDED
Status: DISCONTINUED | OUTPATIENT
Start: 2018-08-17 | End: 2018-08-17 | Stop reason: HOSPADM

## 2018-08-17 RX ORDER — FAMOTIDINE 20 MG/1
20 TABLET, FILM COATED ORAL ONCE
Status: DISCONTINUED | OUTPATIENT
Start: 2018-08-17 | End: 2018-08-17 | Stop reason: HOSPADM

## 2018-08-17 RX ORDER — FENTANYL CITRATE 50 UG/ML
INJECTION, SOLUTION INTRAMUSCULAR; INTRAVENOUS AS NEEDED
Status: DISCONTINUED | OUTPATIENT
Start: 2018-08-17 | End: 2018-08-17 | Stop reason: HOSPADM

## 2018-08-17 RX ORDER — INSULIN LISPRO 100 [IU]/ML
INJECTION, SOLUTION INTRAVENOUS; SUBCUTANEOUS ONCE
Status: COMPLETED | OUTPATIENT
Start: 2018-08-17 | End: 2018-08-17

## 2018-08-17 RX ORDER — DEXTROSE 50 % IN WATER (D50W) INTRAVENOUS SYRINGE
25-50 AS NEEDED
Status: DISCONTINUED | OUTPATIENT
Start: 2018-08-17 | End: 2018-08-17

## 2018-08-17 RX ORDER — SODIUM CHLORIDE, SODIUM LACTATE, POTASSIUM CHLORIDE, CALCIUM CHLORIDE 600; 310; 30; 20 MG/100ML; MG/100ML; MG/100ML; MG/100ML
75 INJECTION, SOLUTION INTRAVENOUS CONTINUOUS
Status: DISCONTINUED | OUTPATIENT
Start: 2018-08-17 | End: 2018-08-17 | Stop reason: HOSPADM

## 2018-08-17 RX ORDER — PROPOFOL 10 MG/ML
INJECTION, EMULSION INTRAVENOUS AS NEEDED
Status: DISCONTINUED | OUTPATIENT
Start: 2018-08-17 | End: 2018-08-17 | Stop reason: HOSPADM

## 2018-08-17 RX ADMIN — MORPHINE SULFATE 2 MG: 2 INJECTION, SOLUTION INTRAMUSCULAR; INTRAVENOUS at 12:05

## 2018-08-17 RX ADMIN — HYDROMORPHONE HYDROCHLORIDE 1 MG: 1 INJECTION, SOLUTION INTRAMUSCULAR; INTRAVENOUS; SUBCUTANEOUS at 21:45

## 2018-08-17 RX ADMIN — PIPERACILLIN SODIUM,TAZOBACTAM SODIUM 4.5 G: 4; .5 INJECTION, POWDER, FOR SOLUTION INTRAVENOUS at 15:02

## 2018-08-17 RX ADMIN — MORPHINE SULFATE 2 MG: 2 INJECTION, SOLUTION INTRAMUSCULAR; INTRAVENOUS at 05:05

## 2018-08-17 RX ADMIN — Medication 10 ML: at 16:58

## 2018-08-17 RX ADMIN — INSULIN LISPRO 10 UNITS: 100 INJECTION, SOLUTION INTRAVENOUS; SUBCUTANEOUS at 08:10

## 2018-08-17 RX ADMIN — OXYCODONE HYDROCHLORIDE 10 MG: 5 TABLET ORAL at 19:42

## 2018-08-17 RX ADMIN — SODIUM CHLORIDE 1250 MG: 900 INJECTION, SOLUTION INTRAVENOUS at 20:22

## 2018-08-17 RX ADMIN — HYDROMORPHONE HYDROCHLORIDE 1 MG: 1 INJECTION, SOLUTION INTRAMUSCULAR; INTRAVENOUS; SUBCUTANEOUS at 16:55

## 2018-08-17 RX ADMIN — INSULIN LISPRO 6 UNITS: 100 INJECTION, SOLUTION INTRAVENOUS; SUBCUTANEOUS at 21:46

## 2018-08-17 RX ADMIN — MIDAZOLAM HYDROCHLORIDE 2 MG: 1 INJECTION, SOLUTION INTRAMUSCULAR; INTRAVENOUS at 09:36

## 2018-08-17 RX ADMIN — INSULIN LISPRO 12 UNITS: 100 INJECTION, SOLUTION INTRAVENOUS; SUBCUTANEOUS at 17:09

## 2018-08-17 RX ADMIN — FENTANYL CITRATE 100 MCG: 50 INJECTION, SOLUTION INTRAMUSCULAR; INTRAVENOUS at 09:39

## 2018-08-17 RX ADMIN — SODIUM CHLORIDE 1250 MG: 900 INJECTION, SOLUTION INTRAVENOUS at 12:13

## 2018-08-17 RX ADMIN — MORPHINE SULFATE 2 MG: 2 INJECTION, SOLUTION INTRAMUSCULAR; INTRAVENOUS at 15:07

## 2018-08-17 RX ADMIN — PIPERACILLIN SODIUM,TAZOBACTAM SODIUM 4.5 G: 4; .5 INJECTION, POWDER, FOR SOLUTION INTRAVENOUS at 08:09

## 2018-08-17 RX ADMIN — INSULIN LISPRO 3 UNITS: 100 INJECTION, SOLUTION INTRAVENOUS; SUBCUTANEOUS at 12:30

## 2018-08-17 RX ADMIN — SODIUM CHLORIDE, SODIUM LACTATE, POTASSIUM CHLORIDE, CALCIUM CHLORIDE: 600; 310; 30; 20 INJECTION, SOLUTION INTRAVENOUS at 09:36

## 2018-08-17 RX ADMIN — PROPOFOL 200 MG: 10 INJECTION, EMULSION INTRAVENOUS at 09:42

## 2018-08-17 RX ADMIN — Medication 10 ML: at 11:10

## 2018-08-17 RX ADMIN — ONDANSETRON 4 MG: 2 INJECTION INTRAMUSCULAR; INTRAVENOUS at 09:47

## 2018-08-17 RX ADMIN — INSULIN LISPRO 9 UNITS: 100 INJECTION, SOLUTION INTRAVENOUS; SUBCUTANEOUS at 10:35

## 2018-08-17 RX ADMIN — PIPERACILLIN SODIUM,TAZOBACTAM SODIUM 4.5 G: 4; .5 INJECTION, POWDER, FOR SOLUTION INTRAVENOUS at 00:43

## 2018-08-17 RX ADMIN — LIDOCAINE HYDROCHLORIDE 100 MG: 20 INJECTION, SOLUTION EPIDURAL; INFILTRATION; INTRACAUDAL; PERINEURAL at 09:42

## 2018-08-17 NOTE — PROGRESS NOTES
conducted an initial consultation and spiritual assessment for Catracho Fuller, who is a 26 y. o.,male. Patient volunteered that he was in a great deal of pain, and his grimacing appeared to support that. Patients primary language is: [place]. According to the patients EMR, his Adventist affiliation is: No preference. The  provided the following interventions:  Provided information about Spiritual Care Services. Initiated a relationship of care and support. Listened empathically. Encouraged patient to let his nurse know that he believed his pain medicine was not working. I overheard him speaking with the nurses' station staff after I left his room. Offered assurance of continued prayers on patient's behalf. Reviewed chart. The following outcomes were achieved:  Patient shared limited information about his medical situation. Patient expressed feelings about current hospitalization. \"I hate to say this, but I should have gone to [facility]. \"  Patient expressed gratitude for the 's visit. Assessment:  Patient did not indicate any Adventist/cultural needs that will affect his preferences in health care. Patient did not indicate any spiritual or Adventist issues which require further Spiritual Care Services interventions at this time. Plan:  Chaplains will continue to follow and will provide pastoral care on an as-needed/requested basis.     [facility]  Board Certified [address] [place] [address],[address] [place] [address]    [phone]

## 2018-08-17 NOTE — PROGRESS NOTES
Received patient approximately 2200. No signs of distress. Ordered meds given throughout the night. Wiped patient with CHG wipes at night at night. Bedside shift change report given to RN Sussy Frances (oncoming nurse) by Guerita Moody (offgoing nurse). Report included the following information SBAR.

## 2018-08-17 NOTE — ROUTINE PROCESS
TRANSFER - IN REPORT:    Verbal report received from [facility] on Iris Hash  being received from PACU for routine post - op      Report consisted of patients Situation, Background, Assessment and   Recommendations(SBAR). Information from the following report(s) SBAR, Kardex, OR Summary, Procedure Summary, Intake/Output, MAR and Recent Results was reviewed with the receiving nurse. Opportunity for questions and clarification was provided. Assessment completed upon patients arrival to unit and care assumed.

## 2018-08-17 NOTE — PROGRESS NOTES
Pharmacy Dosing Services: Vancomycin    Indication: Osteomyelitis ([address] Skin and Soft Tissue Infection    Day of therapy: 0    Other Antimicrobials (Include dose, start day & day of therapy):  Zosyn 4.5 grams every 8 hours EMEKA, 2018      Loading dose (date given): 2,00 mg  Current Maintenance dose: New start    Goal Vancomycin Level: 15-20 mcg/mL  (Trough 15-20 for most infections, 20 for meningitis/osteomyelitis, pre-HD level ~25)    Vancomycin Level (if drawn):   New start     Significant Cultures:   2018 Blood culture taken    Renal function stable? (unstable defined as SCr increase of 0.5 mg/dL or > 50% increase from baseline, whichever is greater) (Y/N): Y     CAPD, Hemodialysis or Renal Replacement Therapy (Y/N): N     Recent Labs      18   CREA  1.09   BUN  10   WBC  32.5*     Temp (24hrs), Av.6 °F (37 °C), Min:98.1 °F (36.7 °C), Max:99 °F (37.2 °C)    Creatinine Clearance (Creatinine Clearance (ml/min)): Estimated Creatinine Clearance: 101.4 mL/min (based on Cr of 1.09). Regimen assessment: New start  Maintenance dose: 1,250 mg every 8 hours  Next scheduled level: 2018 at [facility] 13 will follow daily and adjust medications as appropriate for renal function and/or serum levels.     Thank you,  Jose Antonio Lundberg, LUCIAD

## 2018-08-17 NOTE — PERIOP NOTES
TRANSFER - IN REPORT:    Verbal report received from [address] [place] [address], RN on Elena Callas  being received from room [phone] for routine progression of care      Report consisted of patients Situation, Background, Assessment and   Recommendations(SBAR). Information from the following report(s) SBAR was reviewed with the receiving nurse. Opportunity for questions and clarification was provided. Assessment completed upon patients room    Surgery consent needs MD signature. Needs Anes consent. Patent 18# and 20# PIVs    .  Bedside RN to treat

## 2018-08-17 NOTE — CONSULTS
Infectious Disease Consultation Note    Requested by: Dr. Rosa Olivares    Reason: Antimicrobial management for right foot necrotizing infection    Current abx Prior abx   Vancomycin, zosyn 8/16 - 1       ASSESSMENT - > REC:     Necrotizing Fasciitis R foot, R 5th toe OM  - s/p I&D, right 5th toe amputation  -  gs,cx IP -> continue Vancomycin, zosyn for now  -> duration of abx if all infected bone has been removed will be until no further surgical debridement is needed  -> monitor OR cx's   SIRS/sepsis  - Leukocytosis, tachycardia present on admission  - due to above  - improving -> abx as above   DM I  - on insulin x 20 years    H/o prior toe amputations  - L 3rd toe 2017, R 3rd toe 4/18/2018      MICROBIOLOGY:   8/16 blcx IP x 2  8/17 Wd gs cx IP     LINES AND CATHETERS:   piv    HPI:    26year-old -American male with DM I on insulin x 20 years wit h/o prior amputations of L 3rd and R 3rd toes admitted to [facility]  8/16/2018 due to right foot gangrene. He had his left 3rd toe amputated in 2017 and the right 3rd toe was amputated on 4/18/2018. Two weeks PTA he began noticing pain in his left 5th toe and it start looking white. He had been walking in the rain and through flooded areas with his sneakers on. The pain worsened and he developed purulent foul smelling drainage leading him to present to the ED. There was no fever or chills but he was found to have a gangrenous, black 5th right toe with dorsal erythema. Right foot xray showed soft tissue swelling and subcutaneous gas along the fifth digit with widening of the proximal interphalangeal joint space and adjacent mild cortical irregularity suspicious for early osteomyelitis. He was admitted on empiric Vancomycin and Zosyn and then taken to the OR by Podiatry (Dr. Symone Ward) for I&D and right 5th toe amputation. He found \" Kirby, dishwater pus planing through tissue, putrid malodor, gangrenous fifth toe. Gram stain and culture are pending.      Past Medical History:   Diagnosis Date    Diabetes ([facility] [address]        Past Surgical History:   Procedure Laterality Date    HX AMPUTATION TOE      left and right foot       Allergies: Review of patient's allergies indicates no known allergies. .    Current Facility-Administered Medications   Medication Dose Route Frequency    morphine injection 2 mg  2 mg IntraVENous Q6H PRN    [START ON 2018] VANCOMYCIN INFORMATION NOTE   Other ONCE    sodium chloride (NS) flush 5-10 mL  5-10 mL IntraVENous PRN    insulin lispro (HUMALOG) injection   SubCUTAneous AC&HS    glucose chewable tablet 16 g  4 Tab Oral PRN    glucagon (GLUCAGEN) injection 1 mg  1 mg IntraMUSCular PRN    dextrose (D50W) injection syrg 12.5-25 g  25-50 mL IntraVENous PRN    vancomycin (VANCOCIN) 1,250 mg in 0.9% sodium chloride 250 mL IVPB  1,250 mg IntraVENous Q8H    VANCOMYCIN INFORMATION NOTE   Other Rx Dosing/Monitoring    piperacillin-tazobactam (ZOSYN) 4.5 g in 0.9% sodium chloride (MBP/ADV) 100 mL MBP Extended 4 hour infusion interval###  4.5 g IntraVENous Q8H       Family History   Problem Relation Age of Onset    No Known Problems Mother     Heart Attack Father      Social History     Social History    Marital status: SINGLE     Spouse name: N/A    Number of children: N/A    Years of education: N/A     Occupational History    Not on file.      Social History Main Topics    Smoking status: Current Every Day Smoker     Packs/day: 0.50    Smokeless tobacco: Never Used    Alcohol use No    Drug use: No    Sexual activity: Not on file     Other Topics Concern    Not on file     Social History Narrative     History   Smoking Status    Current Every Day Smoker    Packs/day: 0.50   Smokeless Tobacco    Never Used        Temp (24hrs), Av.3 °F (36.8 °C), Min:97.5 °F (36.4 °C), Max:99 °F (37.2 °C)    Visit Vitals    BP (!) 131/91 (BP 1 Location: Right arm, BP Patient Position: At rest)    Pulse 87    Temp 97.5 °F (36.4 °C)    Resp 16  Ht 6' 3\" (1.905 m)    Wt 75.8 kg (167 lb)    SpO2 100%    BMI 20.87 kg/m2       ROS:A comprehensive review of systems was negative except for that written in the History of Present Illness. Physical Exam:    General: Well developed, well nourished 26 y.o.  male in no acute distress. ENT: ENT exam normal, no neck nodes or sinus tenderness  Head: normocephalic, without obvious abnormality  Mouth:  mucous membranes moist, pharynx normal without lesions  Neck: supple, symmetrical, trachea midline   Cardio:  regular rate and rhythm, S1, S2 normal, no murmur, click, rub or gallop  Chest: inspection normal - no chest wall deformities or tenderness, respiratory effort normal, symmetric  Lungs: clear to auscultation, no wheezes or rales and unlabored breathing  Abdomen: soft, non-tender. Bowel sounds normal. No masses, no organomegaly.   Extremities:  no redness or tenderness in the calves or thighs, no edema, right foot enclosed in bulky post op dressing  Neuro: Grossly normal      Labs: Results:   Chemistry Recent Labs      08/17/18   0350  08/16/18 1945   GLU  294*  267*   NA  134*  136   K  4.0  3.8   CL  97*  99*   CO2  25  25   BUN  11  10   CREA  1.14  1.09   CA  8.4*  8.3*   AGAP  12  12   BUCR  10*  9*   AP   --   170*   TP   --   7.5   ALB   --   2.7*   GLOB   --   4.8*   AGRAT   --   0.6*      CBC w/Diff Recent Labs      08/17/18   0350  08/16/18 1945   WBC  28.3*  32.5*   RBC  4.26*  4.14*   HGB  12.1*  11.6*   HCT  35.3*  33.7*   PLT  484*  430*   GRANS  76*  82*   LYMPH  12*  9*   EOS  0  0      Microbiology Recent Labs      08/16/18 1945   CULT  NO GROWTH AFTER 11 HOURS  NO GROWTH AFTER 11 HOURS        Nova Bejarano M.D.  [facility]   [phone]

## 2018-08-17 NOTE — PROGRESS NOTES
Problem: Falls - Risk of  Goal: *Absence of Falls  Document Baljinder Fall Risk and appropriate interventions in the flowsheet.    Outcome: Progressing Towards Goal  Fall Risk Interventions:  Mobility Interventions: Patient to call before getting OOB              Elimination Interventions: Call light in reach

## 2018-08-17 NOTE — H&P
History and Physical    Patient: Dario Hollingsworth               Sex: male          DOA: 8/16/2018       YOB: 1985      Age:  26 y.o.        LOS:  LOS: 1 day        HPI:     Dario Hollingsworth is a 26 y.o. male who presented to the ER with gangrenous changes involving his right 5th toe. He reports that this has been worsening for about 2 weeks. He recently was able to sign a lease for a place to live and had been in a shelter prior to that. He had trouble with his feet getting wet and not able to dry out a lot. There has been pain involving the toe. No fever. He lost the second toe on the same foot previously. He had wet gangrene of the toe and will be admitted for ongoing management. Past Medical History:   Diagnosis Date    Diabetes ([facility] [address] 1998       Social History:   Tobacco use:  Patient smokes uncommonly   Alcohol use:  Patient drinks beer some days   Occupation:  Patient works in concessions food services     Family History:   Multiple family members with HTN    Review of Systems    Constitutional:  No fever or weight loss  HEENT:  No headache or visual changes  Cardiovascular:  No chest pain or diaphoresis  Respiratory:  No coughing, wheezing, or shortness of breath. GI:  No nausea or vomitting. No diarrhea  :  No hematuria or dysuria  Skin:  No rashes or moles  Neuro:  No seizures or syncope  Musculoskeletal:  Gangrenous changes to right 5th toe  Hematological:  No bruising or bleeding  Endocrine:  Known diabetes, no thyroid disease    Physical Exam:      Visit Vitals    /71 (BP 1 Location: Left arm, BP Patient Position: At rest)    Pulse (!) 108    Temp 98.4 °F (36.9 °C)    Resp 18    Ht 6' 3\" (1.905 m)    Wt 75.8 kg (167 lb)    SpO2 95%    BMI 20.87 kg/m2       Physical Exam:    Gen:  No distress, alert  HEENT:  Normal cephalic atraumatic, extra-occular movements are intact.   Neck:  Supple, No JVD  Lungs:  Clear bilaterally, no wheeze, no rales, normal effort  Heart: Regular Rate and Rhythm, normal S1 and S2, no edema  Abdomen:  Soft, non tender, normal bowel sounds, no guarding. Extremities:  Well perfused, no cyanosis or edema  Neurological:  Awake and alert, CN's are intact, normal strength throughout extremities  Skin:  No rashes or moles  Mental Status:  Normal thought process, does not appear anxious  Extremities:  Right 5th toe blackened, foul smelling    Laboratory Studies:    BMP:   Lab Results   Component Value Date/Time     (L) 08/17/2018 03:50 AM    K 4.0 08/17/2018 03:50 AM    CL 97 (L) 08/17/2018 03:50 AM    CO2 25 08/17/2018 03:50 AM    AGAP 12 08/17/2018 03:50 AM     (H) 08/17/2018 03:50 AM    BUN 11 08/17/2018 03:50 AM    CREA 1.14 08/17/2018 03:50 AM    GFRAA >60 08/17/2018 03:50 AM    GFRNA >60 08/17/2018 03:50 AM     CBC:   Lab Results   Component Value Date/Time    WBC 28.3 (H) 08/17/2018 03:50 AM    HGB 12.1 (L) 08/17/2018 03:50 AM    HCT 35.3 (L) 08/17/2018 03:50 AM     (H) 08/17/2018 03:50 AM       Assessment/Plan     Active Problems:    Osteomyelitis ([address] (8/16/2018)        PLAN:    Patient is on IV antibiotics  Podiatry is consulted  X-ray shows evidence of osteomyelitis, patient will require amputation of toe.

## 2018-08-17 NOTE — PROGRESS NOTES
Problem: Falls - Risk of  Goal: *Absence of Falls  Document Baljinder Fall Risk and appropriate interventions in the flowsheet.    Outcome: Progressing Towards Goal  Fall Risk Interventions:  Mobility Interventions: Communicate number of staff needed for ambulation/transfer, Patient to call before getting OOB     Medication Interventions: Patient to call before getting OOB, Teach patient to arise slowly    Elimination Interventions: Call light in reach, Patient to call for help with toileting needs, Toileting schedule/hourly rounds, Urinal in reach, Toilet paper/wipes in reach    Problem: General Infection Care Plan (Adult and Pediatric)  Goal: Improvement in signs and symptoms of infection  Outcome: Not Progressing Towards Goal  Surgery for amputation today

## 2018-08-17 NOTE — ED PROVIDER NOTES
HPI Comments: Bakari Gonzales is a 26 y.o. Male with h/o iddm, toe amputations with c/o increasing pain in right foot, 5th toe for several days, now discolored, black with increased redness, pain, swelling to foot. Feels like when he has had previous infection. Nothing taken for pain. No fever, nvd, abd pain, urinary sx. Constant severe pain, throbbing worse with ambulation    The history is provided by the patient. Past Medical History:   Diagnosis Date    Diabetes ([address] 1998       Past Surgical History:   Procedure Laterality Date    HX AMPUTATION TOE      left and right foot         Family History:   Problem Relation Age of Onset    No Known Problems Mother     Heart Attack Father        Social History     Social History    Marital status: SINGLE     Spouse name: N/A    Number of children: N/A    Years of education: N/A     Occupational History    Not on file. Social History Main Topics    Smoking status: Current Every Day Smoker     Packs/day: 0.50    Smokeless tobacco: Never Used    Alcohol use No    Drug use: No    Sexual activity: Not on file     Other Topics Concern    Not on file     Social History Narrative         ALLERGIES: Review of patient's allergies indicates no known allergies. Review of Systems   Constitutional: Positive for chills. HENT: Negative for sore throat. Eyes: Negative for visual disturbance. Respiratory: Negative for cough and shortness of breath. Cardiovascular: Negative for chest pain. Gastrointestinal: Negative for abdominal pain. Endocrine: Negative for polyuria. Genitourinary: Negative for difficulty urinating. Musculoskeletal: Positive for arthralgias and joint swelling. Skin: Positive for color change and wound. Allergic/Immunologic: Negative for food allergies. Neurological: Negative for syncope. Psychiatric/Behavioral: Positive for sleep disturbance.        Vitals:    08/16/18 1851 08/16/18 2000 08/16/18 2015   BP: 144/86 (!) 143/98 154/86   Pulse: (!) 120 (!) 109 (!) 106   Resp: 18 15 19   Temp: 99 °F (37.2 °C)     SpO2: 99% 100% 100%   Weight: 75.8 kg (167 lb)     Height: 6' 3\" (1.905 m)              Physical Exam   Constitutional: He is oriented to person, place, and time. Non-toxic appearance. He has a sickly appearance. He appears ill. He appears distressed. HENT:   Head: Normocephalic and atraumatic. Right Ear: External ear normal.   Left Ear: External ear normal.   Nose: Nose normal.   Mouth/Throat: Oropharynx is clear and moist. No oropharyngeal exudate. Eyes: Conjunctivae are normal.   Neck: Normal range of motion. Cardiovascular: Regular rhythm, normal heart sounds and intact distal pulses. Tachycardia present. Pulses:       Dorsalis pedis pulses are 1+ on the right side        Posterior tibial pulses are 1+ on the right side   Pulmonary/Chest: Effort normal and breath sounds normal. No respiratory distress. Abdominal: Soft. There is no tenderness. Musculoskeletal: Normal range of motion. He exhibits no edema. Feet:    Neurological: He is alert and oriented to person, place, and time. Skin: Skin is warm and dry. He is not diaphoretic. Psychiatric: His behavior is normal.   Nursing note and vitals reviewed.        [facility]      ED Course       Procedures    Vitals:  Patient Vitals for the past 12 hrs:   Temp Pulse Resp BP SpO2   08/16/18 2015 - (!) 106 19 154/86 100 %   08/16/18 2000 - (!) 109 15 (!) 143/98 100 %   08/16/18 1851 99 °F (37.2 °C) (!) 120 18 144/86 99 %         Medications ordered:   Medications   sodium chloride (NS) flush 5-10 mL (not administered)   piperacillin-tazobactam (ZOSYN) 4.5 g in 0.9% sodium chloride (MBP/ADV) 100 mL MBP (0 g IntraVENous IV Completed 8/16/18 2017)   vancomycin (VANCOCIN) 2000 mg in  ml infusion (2,000 mg IntraVENous New Bag 8/16/18 2008)   sodium chloride 0.9 % bolus infusion 1,000 mL (1,000 mL IntraVENous New Bag 8/16/18 1949)     Followed by   sodium chloride 0.9 % bolus infusion 1,000 mL (1,000 mL IntraVENous New Bag 8/16/18 2005)     Followed by   sodium chloride 0.9 % bolus infusion 274 mL (274 mL IntraVENous New Bag 8/16/18 2014)   morphine injection 10 mg (10 mg IntraVENous Given 8/16/18 2006)   ondansetron (ZOFRAN) injection 4 mg (4 mg IntraVENous Given 8/16/18 2005)   ketorolac (TORADOL) injection 30 mg (30 mg IntraVENous Given 8/16/18 2003)         Lab findings:  Recent Results (from the past 12 hour(s))   METABOLIC PANEL, COMPREHENSIVE    Collection Time: 08/16/18  7:45 PM   Result Value Ref Range    Sodium 136 136 - 145 mmol/L    Potassium 3.8 3.5 - 5.5 mmol/L    Chloride 99 (L) 100 - 108 mmol/L    CO2 25 21 - 32 mmol/L    Anion gap 12 3.0 - 18 mmol/L    Glucose 267 (H) 74 - 99 mg/dL    BUN 10 7.0 - 18 MG/DL    Creatinine 1.09 0.6 - 1.3 MG/DL    BUN/Creatinine ratio 9 (L) 12 - 20      GFR est AA >60 >60 ml/min/1.73m2    GFR est non-AA >60 >60 ml/min/1.73m2    Calcium 8.3 (L) 8.5 - 10.1 MG/DL    Bilirubin, total 0.5 0.2 - 1.0 MG/DL    ALT (SGPT) 19 16 - 61 U/L    AST (SGOT) 20 15 - 37 U/L    Alk. phosphatase 170 (H) 45 - 117 U/L    Protein, total 7.5 6.4 - 8.2 g/dL    Albumin 2.7 (L) 3.4 - 5.0 g/dL    Globulin 4.8 (H) 2.0 - 4.0 g/dL    A-G Ratio 0.6 (L) 0.8 - 1.7     CBC WITH AUTOMATED DIFF    Collection Time: 08/16/18  7:45 PM   Result Value Ref Range    WBC 32.5 (H) 4.6 - 13.2 K/uL    RBC 4.14 (L) 4.70 - 5.50 M/uL    HGB 11.6 (L) 13.0 - 16.0 g/dL    HCT 33.7 (L) 36.0 - 48.0 %    MCV 81.4 74.0 - 97.0 FL    MCH 28.0 24.0 - 34.0 PG    MCHC 34.4 31.0 - 37.0 g/dL    RDW 12.7 11.6 - 14.5 %    PLATELET 702 (H) 767 - 420 K/uL    MPV 9.8 9.2 - 11.8 FL    NEUTROPHILS 82 (H) 40 - 73 %    LYMPHOCYTES 9 (L) 21 - 52 %    MONOCYTES 9 3 - 10 %    EOSINOPHILS 0 0 - 5 %    BASOPHILS 0 0 - 2 %    ABS. NEUTROPHILS 26.6 (H) 1.8 - 8.0 K/UL    ABS. LYMPHOCYTES 3.0 0.9 - 3.6 K/UL    ABS. MONOCYTES 2.8 (H) 0.05 - 1.2 K/UL    ABS. EOSINOPHILS 0.0 0.0 - 0.4 K/UL    ABS. BASOPHILS 0.1 0.0 - 0.1 K/UL    DF AUTOMATED     POC LACTIC ACID    Collection Time: 08/16/18  7:55 PM   Result Value Ref Range    Lactic Acid (POC) 0.9 0.4 - 2.0 mmol/L       EKG interpretation by ED Physician:      X-Ray, CT or other radiology findings or impressions:  XR CHEST PORT    (Results Pending)   XR FOOT RT MIN 3 V    (Results Pending)   cxr with nap  Foot with osteo changes 5th toe; gas in soft tissue    Progress notes, Consult notes or additional Procedure notes:   C/w below. Doubt need for emergent operative intervention tonight  D/w dr Kathryn Monaco on call for podiatry who will arrange operative intervention first thing in am  D/w dr Kavita Valladares on call for hospitalist who will admit    Reevaluation of patient:   stable    Disposition:  Diagnosis:   1. Gangrene of toe of right foot ([facility]    2. Cellulitis of right foot    3. IDDM (insulin dependent diabetes mellitus) ([facility]        Disposition: admit      Follow-up Information     None            Patient's Medications   Start Taking    No medications on file   Continue Taking    INSULIN NPH (NOVOLIN N, HUMULIN N) 100 UNIT/ML INJECTION    by SubCUTAneous route once. INSULIN REGULAR, HUMAN (NOVOLIN R INJECTION)    by Injection route. These Medications have changed    No medications on file   Stop Taking    CARBAMIDE PEROXIDE (DEBROX) 6.5 % OTIC SOLUTION    Administer 5 Drops into each ear two (2) times a day.

## 2018-08-17 NOTE — ANESTHESIA PREPROCEDURE EVALUATION
Anesthetic History   No history of anesthetic complications            Review of Systems / Medical History  Patient summary reviewed and pertinent labs reviewed    Pulmonary          Smoker         Neuro/Psych   Within defined limits           Cardiovascular  Within defined limits                Exercise tolerance: >4 METS     GI/Hepatic/Renal  Within defined limits              Endo/Other    Diabetes: poorly controlled, type 2         Other Findings   Comments: Documentation of current medication  Current medications obtained, documented and obtained? YES      Risk Factors for Postoperative nausea/vomiting:       History of postoperative nausea/vomiting? NO       Female? NO       Motion sickness? NO       Intended opioid administration for postoperative analgesia? YES      Smoking Abstinence:  Current Smoker? YES  Elective Surgery? NO  Seen preoperatively by anesthesiologist or proxy prior to day of surgery? YES  Pt abstained from smoking 24 hours prior to anesthesia? NO    Preventive care/screening for High Blood Pressure:  Aged 18 years and older: YES  Screened for high blood pressure: YES  Patients with high blood pressure referred to primary care provider   for BP management: YES                 Physical Exam    Airway  Mallampati: II  TM Distance: 4 - 6 cm  Neck ROM: normal range of motion   Mouth opening: Normal     Cardiovascular  Regular rate and rhythm,  S1 and S2 normal,  no murmur, click, rub, or gallop  Rhythm: regular  Rate: normal         Dental    Dentition: Poor dentition  Comments: The patient has very poor dentition. Loose, broken, and or missing teeth. I explained the risk of dental damage and or loss. The patient understands and accepts these risks.      Pulmonary  Breath sounds clear to auscultation               Abdominal  GI exam deferred       Other Findings            Anesthetic Plan    ASA: 3  Anesthesia type: general          Induction: Intravenous  Anesthetic plan and risks discussed with: Patient

## 2018-08-17 NOTE — ED NOTES
TRANSFER - ED to INPATIENT REPORT:    SBAR report made available to receiving floor on this patient being transferred to [facility]  for routine progression of care       Admitting diagnosis Right foot infection   Osteomyelitis ([address]    Information from the following report(s) SBAR was made available to receiving floor. Lines:   Peripheral IV 08/16/18 Left Antecubital (Active)   Site Assessment Clean, dry, & intact 8/16/2018  7:45 PM   Phlebitis Assessment 0 8/16/2018  7:45 PM   Infiltration Assessment 0 8/16/2018  7:45 PM   Dressing Status Clean, dry, & intact 8/16/2018  7:45 PM   Dressing Type Transparent 8/16/2018  7:45 PM   Hub Color/Line Status Green 8/16/2018  7:45 PM        Medication list confirmed with patient    Opportunity for questions and clarification was provided.       Patient is oriented to time, place, person and situation   Patient is  continent and ambulatory without assist     Valuables transported with patient     Patient transported with:   Syn[employer]

## 2018-08-17 NOTE — PROGRESS NOTES
0810  Glucose 373; Dr Ritu Freeman paged to notify    2143  Dr Ritu Freeman paged 2nd time    2162  Dr Ritu Freeman notified of pt's glucose. No new orders    0908  Pt off floor to surgery    1151  Pt back from surgery. Advanced to insulin-resistant sliding scale per protocol    1239  Pt tearful due to pain. Medicated 1205 with 2 mg morphine. Dr Ritu Freeman paged. [address] [facility] Dr Ritu Freeman that pt reports pain not well controlled. Morphine increased to Q3 hrs PRN    1647  Pt tearful due to pain. Informed Dr Ritu Freeman that pt reports pain meds not working. New orders for IV Dilaudid and PO Oxycodone ordered. Morphine DC    1739  Pt reports 0/10 pain at this time    Bedside and Verbal shift change report given to [address] [place] [address] by Cruz Patel RN. Report included the following information SBAR, Kardex, Intake/Output and MAR.

## 2018-08-17 NOTE — PERIOP NOTES
1029  Patient received in PACU and connected to monitors. Vital signs stable. RN at bedside. Will continue to monitor. [id number] [facility] blood sugar = 256, medicated per MAR.    1122  TRANSFER - OUT REPORT:    Verbal report given to JEN Briceño(name) on [employer]  being transferred to [facility](unit) for routine post - op       Report consisted of patients Situation, Background, Assessment and   Recommendations(SBAR). Information from the following report(s) SBAR, Kardex, OR Summary, Intake/Output, MAR and Recent Results was reviewed with the receiving nurse. Lines:   Peripheral IV 08/16/18 Left Antecubital (Active)   Site Assessment Clean, dry, & intact 8/17/2018 10:59 AM   Phlebitis Assessment 0 8/17/2018 10:59 AM   Infiltration Assessment 0 8/17/2018 10:59 AM   Dressing Status Clean, dry, & intact 8/17/2018 10:59 AM   Dressing Type Transparent;Tape 8/17/2018 10:59 AM   Hub Color/Line Status Green;Flushed;Capped 8/17/2018 10:59 AM   Action Taken Open ports on tubing capped 8/17/2018 10:59 AM   Alcohol Cap Used Yes 8/17/2018 10:59 AM       Peripheral IV Right Forearm (Active)   Site Assessment Clean, dry, & intact 8/17/2018 10:59 AM   Phlebitis Assessment 0 8/17/2018 10:59 AM   Infiltration Assessment 0 8/17/2018 10:59 AM   Dressing Status Clean, dry, & intact 8/17/2018 10:59 AM   Dressing Type Transparent;Tape 8/17/2018 10:59 AM   Hub Color/Line Status Pink; Infusing 8/17/2018 10:59 AM   Action Taken Open ports on tubing capped 8/17/2018 10:59 AM   Alcohol Cap Used Yes 8/17/2018 10:59 AM        Opportunity for questions and clarification was provided. Patient transported with:   Tech    1130  Pt resting with eyes closed, awaiting transport back to room 3015.

## 2018-08-17 NOTE — ANESTHESIA POSTPROCEDURE EVALUATION
Post-Anesthesia Evaluation and Assessment    Patient: Rayray Cheung MRN: [id number]  SSN: [id number]    YOB: 1985  Age: 26 y.o. Sex: male       Cardiovascular Function/Vital Signs  Visit Vitals    /68    Pulse 82    Temp 36.9 °C (98.5 °F)    Resp 12    Ht 6' 3\" (1.905 m)    Wt 75.8 kg (167 lb)    SpO2 98%    BMI 20.87 kg/m2       Patient is status post general anesthesia for Procedure(s):  INCISION AND DRAINAGE RIGHT FOOT,  5TH TOE AMPUTATION . Nausea/Vomiting: None    Postoperative hydration reviewed and adequate. Pain:  Pain Scale 1: Numeric (0 - 10) (08/17/18 1059)  Pain Intensity 1: 0 (08/17/18 1059)   Managed    Neurological Status:   Neuro (WDL): Within Defined Limits (08/17/18 1059)  Neuro  Neurologic State: Sleeping;Eyes open spontaneously (08/17/18 1059)  Orientation Level: Oriented to person;Oriented to place;Oriented to situation (08/17/18 1059)  LUE Motor Response: Purposeful (08/17/18 0810)  LLE Motor Response: Purposeful (08/17/18 0810)  RUE Motor Response: Purposeful (08/17/18 0810)  RLE Motor Response: Purposeful (08/17/18 1059)   At baseline    Mental Status and Level of Consciousness: Arousable    Pulmonary Status:   O2 Device: Room air (08/17/18 1044)   Adequate oxygenation and airway patent    Complications related to anesthesia: None    Post-anesthesia assessment completed.  No concerns    Signed By: Dorina Arauz MD     August 17, 2018

## 2018-08-17 NOTE — BRIEF OP NOTE
BRIEF OPERATIVE NOTE    Date of Procedure: 8/17/2018   Preoperative Diagnosis: Right foot infection   Postoperative Diagnosis:   NECROTIZING FASCIIITIS  Procedure(s):  INCISION AND DRAINAGE RIGHT FOOT;   5TH TOE AMPUTATION   Surgeon(s) and Role:     * BRONSON Shafer DPM - Primary         Surgical Staff:  Circ-1: Genna Pimentel RN  Scrub Tech-1: Tre Valadez  Surg Asst-1: Donnita Sit  Event Time In   Incision Start 0950   Incision Close 1017     Anesthesia: General   Estimated Blood Loss: 30 ML  Specimens: RIGHT 5TH TOE   Findings: GRAY DISHWATER PUS PLANING THROUGH TISSUE, PUTRID MALODOR, GANGRENOUS 5TH TOE  Complications: NONE  Implants: * No implants in log *

## 2018-08-17 NOTE — PROGRESS NOTES
Chart reviewed and pt verified demographics. He has a new address, now lives at  [address] [facility]. I will email [email or web address]. [place]. Patient is self pay . FYI placed to ask for [email or web address] $4 drug list. For [facility]. He has not PCP. He went to [address] x in past and saw female MD, does not rememeber name.  Consult requested to bring him new [employer] times and places, since he has recently moved. He will need Lyft ride from  and prescription help from the [employer] here at [employer] for [place]. His sister Tera Cano [phone] is his emergency contact. None of his family knows that he is here since he did not want to worry them about this non- life threatening issue he says. He had I/D and amputation of baby toe this admit. He had previous 2 toes amputated in past, says he went home on oral antibiotics and did wound care himself for those. We discussed possibility of needing to go to Infusions center or wound care clinic at [facility]. He states he will go right back to work as soon as they say he can, since he does not get paid unless he is working. He would not be eligible for home health since he will not be homebound. Reason for Admission:   osteomyelitis                   RRAT Score:    11                 Plan for utilizing home health:    Patient says he will not be homebound. He may require IV infusion cneter and /or wound care at [facility]. Likelihood of Readmission:  Mod- he already has amputated toes, this was another one. Transition of Care Plan:      Home, Case Management to follow.

## 2018-08-17 NOTE — PROGRESS NOTES
Patient to undergo I&D right foot; 5th toe amputation today   Will need insulin adjustment-BS elevated   On zosyn and vanc   CBC, BMP am

## 2018-08-18 LAB
ANION GAP SERPL CALC-SCNC: 9 MMOL/L (ref 3–18)
BUN SERPL-MCNC: 7 MG/DL (ref 7–18)
BUN/CREAT SERPL: 8 (ref 12–20)
CALCIUM SERPL-MCNC: 7.8 MG/DL (ref 8.5–10.1)
CHLORIDE SERPL-SCNC: 97 MMOL/L (ref 100–108)
CO2 SERPL-SCNC: 27 MMOL/L (ref 21–32)
CREAT SERPL-MCNC: 0.9 MG/DL (ref 0.6–1.3)
DATE LAST DOSE: NORMAL
ERYTHROCYTE [DISTWIDTH] IN BLOOD BY AUTOMATED COUNT: 13.1 % (ref 11.6–14.5)
GLUCOSE BLD STRIP.AUTO-MCNC: 229 MG/DL (ref 70–110)
GLUCOSE BLD STRIP.AUTO-MCNC: 260 MG/DL (ref 70–110)
GLUCOSE BLD STRIP.AUTO-MCNC: 295 MG/DL (ref 70–110)
GLUCOSE BLD STRIP.AUTO-MCNC: 413 MG/DL (ref 70–110)
GLUCOSE SERPL-MCNC: 353 MG/DL (ref 74–99)
HCT VFR BLD AUTO: 29 % (ref 36–48)
HGB BLD-MCNC: 9.8 G/DL (ref 13–16)
MCH RBC QN AUTO: 27.8 PG (ref 24–34)
MCHC RBC AUTO-ENTMCNC: 33.8 G/DL (ref 31–37)
MCV RBC AUTO: 82.4 FL (ref 74–97)
PLATELET # BLD AUTO: 434 K/UL (ref 135–420)
PMV BLD AUTO: 9.5 FL (ref 9.2–11.8)
POTASSIUM SERPL-SCNC: 4 MMOL/L (ref 3.5–5.5)
RBC # BLD AUTO: 3.52 M/UL (ref 4.7–5.5)
REPORTED DOSE/TIME,TMG: 400
SODIUM SERPL-SCNC: 133 MMOL/L (ref 136–145)
VANCOMYCIN TROUGH SERPL-MCNC: 13.8 UG/ML (ref 10–20)
WBC # BLD AUTO: 20.1 K/UL (ref 4.6–13.2)

## 2018-08-18 PROCEDURE — 74011250636 HC RX REV CODE- 250/636: Performed by: HOSPITALIST

## 2018-08-18 PROCEDURE — 74011000258 HC RX REV CODE- 258: Performed by: INTERNAL MEDICINE

## 2018-08-18 PROCEDURE — 65270000029 HC RM PRIVATE

## 2018-08-18 PROCEDURE — 74011000258 HC RX REV CODE- 258: Performed by: HOSPITALIST

## 2018-08-18 PROCEDURE — 74011250637 HC RX REV CODE- 250/637: Performed by: HOSPITALIST

## 2018-08-18 PROCEDURE — 36415 COLL VENOUS BLD VENIPUNCTURE: CPT | Performed by: NURSE PRACTITIONER

## 2018-08-18 PROCEDURE — 74011250636 HC RX REV CODE- 250/636: Performed by: INTERNAL MEDICINE

## 2018-08-18 PROCEDURE — 80202 ASSAY OF VANCOMYCIN: CPT | Performed by: INTERNAL MEDICINE

## 2018-08-18 PROCEDURE — 74011636637 HC RX REV CODE- 636/637: Performed by: HOSPITALIST

## 2018-08-18 PROCEDURE — 74011636637 HC RX REV CODE- 636/637: Performed by: NURSE PRACTITIONER

## 2018-08-18 PROCEDURE — 85027 COMPLETE CBC AUTOMATED: CPT | Performed by: NURSE PRACTITIONER

## 2018-08-18 PROCEDURE — 80048 BASIC METABOLIC PNL TOTAL CA: CPT | Performed by: NURSE PRACTITIONER

## 2018-08-18 PROCEDURE — 82962 GLUCOSE BLOOD TEST: CPT

## 2018-08-18 RX ORDER — INSULIN GLARGINE 100 [IU]/ML
10 INJECTION, SOLUTION SUBCUTANEOUS EVERY 12 HOURS
Status: DISCONTINUED | OUTPATIENT
Start: 2018-08-18 | End: 2018-08-19

## 2018-08-18 RX ORDER — INSULIN GLARGINE 100 [IU]/ML
12 INJECTION, SOLUTION SUBCUTANEOUS DAILY
Status: DISCONTINUED | OUTPATIENT
Start: 2018-08-18 | End: 2018-08-18

## 2018-08-18 RX ADMIN — HYDROMORPHONE HYDROCHLORIDE 1 MG: 1 INJECTION, SOLUTION INTRAMUSCULAR; INTRAVENOUS; SUBCUTANEOUS at 08:24

## 2018-08-18 RX ADMIN — SODIUM CHLORIDE 1250 MG: 900 INJECTION, SOLUTION INTRAVENOUS at 04:14

## 2018-08-18 RX ADMIN — PIPERACILLIN SODIUM,TAZOBACTAM SODIUM 4.5 G: 4; .5 INJECTION, POWDER, FOR SOLUTION INTRAVENOUS at 08:00

## 2018-08-18 RX ADMIN — INSULIN LISPRO 9 UNITS: 100 INJECTION, SOLUTION INTRAVENOUS; SUBCUTANEOUS at 13:36

## 2018-08-18 RX ADMIN — OXYCODONE HYDROCHLORIDE 5 MG: 5 TABLET ORAL at 04:14

## 2018-08-18 RX ADMIN — PIPERACILLIN SODIUM,TAZOBACTAM SODIUM 4.5 G: 4; .5 INJECTION, POWDER, FOR SOLUTION INTRAVENOUS at 00:15

## 2018-08-18 RX ADMIN — INSULIN LISPRO 15 UNITS: 100 INJECTION, SOLUTION INTRAVENOUS; SUBCUTANEOUS at 08:23

## 2018-08-18 RX ADMIN — INSULIN GLARGINE 10 UNITS: 100 INJECTION, SOLUTION SUBCUTANEOUS at 21:52

## 2018-08-18 RX ADMIN — INSULIN GLARGINE 12 UNITS: 100 INJECTION, SOLUTION SUBCUTANEOUS at 13:35

## 2018-08-18 RX ADMIN — OXYCODONE HYDROCHLORIDE 10 MG: 5 TABLET ORAL at 18:06

## 2018-08-18 RX ADMIN — INSULIN LISPRO 9 UNITS: 100 INJECTION, SOLUTION INTRAVENOUS; SUBCUTANEOUS at 18:07

## 2018-08-18 RX ADMIN — SODIUM CHLORIDE 1250 MG: 900 INJECTION, SOLUTION INTRAVENOUS at 20:17

## 2018-08-18 RX ADMIN — HYDROMORPHONE HYDROCHLORIDE 1 MG: 1 INJECTION, SOLUTION INTRAMUSCULAR; INTRAVENOUS; SUBCUTANEOUS at 21:51

## 2018-08-18 RX ADMIN — OXYCODONE HYDROCHLORIDE 10 MG: 5 TABLET ORAL at 00:15

## 2018-08-18 RX ADMIN — PIPERACILLIN SODIUM,TAZOBACTAM SODIUM 3.38 G: 3; .375 INJECTION, POWDER, FOR SOLUTION INTRAVENOUS at 17:00

## 2018-08-18 RX ADMIN — HYDROMORPHONE HYDROCHLORIDE 1 MG: 1 INJECTION, SOLUTION INTRAMUSCULAR; INTRAVENOUS; SUBCUTANEOUS at 13:53

## 2018-08-18 RX ADMIN — INSULIN LISPRO 6 UNITS: 100 INJECTION, SOLUTION INTRAVENOUS; SUBCUTANEOUS at 21:52

## 2018-08-18 RX ADMIN — SODIUM CHLORIDE 1250 MG: 900 INJECTION, SOLUTION INTRAVENOUS at 13:56

## 2018-08-18 NOTE — PROGRESS NOTES
Assumed patient care from [place], [place]. Received patient awake, alert, oriented X4. Patient denies pain at this time. Bed is locked and in lowest position and call bell is within reach. Not in acute distress.

## 2018-08-18 NOTE — PROGRESS NOTES
2000-no signs of distress. Ordered meds given throughout night. Patient slept entire night. Bedside shift change report given to RN Gerard Herr (oncoming nurse) by Delmi Osei (offgoing nurse). Report included the following information SBAR.

## 2018-08-18 NOTE — OP NOTES
[address] REPORT    Michael Fair  MR#: [id number]  : 1985  ACCOUNT #: [de-identified]   DATE OF SERVICE: 2018    SURGEON:  Hali Mabry MD    PREOPERATIVE DIAGNOSIS:  Right diabetic foot infection. POSTOPERATIVE DIAGNOSES:  1. Necrotizing fasciitis, right foot. 2.  Gangrene, right second toe. PROCEDURES PERFORMED:    1. Incision and drainage, right foot. 2.  Amputation, right 5th toe. ANESTHESIA:  General.    HEMOSTASIS:  None. ESTIMATED BLOOD LOSS:  35 mL. FINDINGS:  Elle Pih type drainage, putrid malodor, necrotic skin, soft tissue and bone. SPECIMENS REMOVED:  Right 5th toe. COMPLICATIONS:  none. IMPLANTS:  None. INDICATIONS FOR SURGERY:  Last night, I was called from the emergency room about the patient who had been admitted for significant infection for the right foot. He had a white blood cell count of 28 and was a diabetic with a prior history of amputations to multiple toes. He has been working many hours as he is trying to get himself out of a shelter and into a home, many hours on his feet. He noticed swelling approximately 2 weeks ago to the foot, but wanted to get his housing before he sought medical attention. He presented to the ER last night with a fetid foot and a severe diabetic foot infection. Upon notification, we immediately scheduled surgery for the following morning making him n.p.o. at that point in time. PROCEDURE IN DETAIL:  The patient was brought to the operating room, placed on the operating table in supine position. After induction of general anesthesia, the right foot was then scrubbed, draped and prepped in the usual manner. Attention was directed to the dorsal lateral aspect of the right foot where through gross observation the foot was 3 times the size of the left foot. It was a deep red and purple. The right 5th toe was almost completely black and necrotic.   There was a putrid smell coming from the right 4th interspace. At this point in time with a towel clamp, the right 5th toe was grasped. An incision was made along the right 4th interspace and immediately copious amounts of dishwater gray purulence was expressed. This was cultured for both aerobic and anaerobic and Gram stain. Next, careful dissection was made removing all of the necrotic severely infected and nonviable tissue. The entire 5th digit was all of the above and was disarticulated from the metatarsophalangeal joint and passed from the operative field as specimen. The plaining of the tissue along with the putrid odor and style of purulence was classic for necrotizing fasciitis. The incision was carried towards the proximal one-half of the foot in the fascial layer just to remove the remaining purulence. There was a nonviable skin over the 5th and partially 4th metatarsal areas which was also debrided and removed. When no necrotic or nonviable tissue remained, the area was copiously lavaged with bacitracin impregnated saline using a Pulsavac 3 L. With clean dressing and instrumentation, the area was packed with quarter inch iodoform packing, 4 x 4's, Kerlix, ABD and an Ace wrap. Before application of the dressing a postoperative block consisting of 0.5% Marcaine plain was given as an ankle block 10 mL. The patient tolerated the procedure and anesthesia well.       ALAN Roche  D: 08/17/2018 10:41     T: 08/18/2018 10:59  JOB #: [id number]

## 2018-08-18 NOTE — PROGRESS NOTES
Problem: Falls - Risk of  Goal: *Absence of Falls  Document Baljinder Fall Risk and appropriate interventions in the flowsheet. Fall Risk Interventions:  Mobility Interventions: Patient to call before getting OOB         Medication Interventions: Patient to call before getting OOB    Elimination Interventions: Call light in reach             Problem: Pressure Injury - Risk of  Goal: *Prevention of pressure injury  Document Jonathan Scale and appropriate interventions in the flowsheet.    Outcome: Progressing Towards Goal  Pressure Injury Interventions:  Sensory Interventions: Assess changes in LOC    Moisture Interventions: Absorbent underpads    Activity Interventions: Increase time out of bed         Nutrition Interventions: Document food/fluid/supplement intake

## 2018-08-18 NOTE — PROGRESS NOTES
[facility]  Hospitalist Division           Inpatient Daily Progress Note        Patient: Haider Braden MRN: [id number]  [facility]: [id number]    YOB: 1985  Age: 26 y.o. Sex: male    DOA: 8/16/2018 LOS:  LOS: 2 days                    Chief Complaint:  Osteomyelitis right 5th toe with wet gangrene    Interval History:      Haider Braden is a 26 y.o. male who presented to the ER with gangrenous changes involving his right 5th toe. He reports that this has been worsening for about 2 weeks. He recently was able to sign a lease for a place to live and had been in a shelter prior to that. He had trouble with his feet getting wet and not able to dry out a lot. There has been pain involving the toe. No fever. He lost the second toe on the same foot previously. He had wet gangrene of the toe and will be admitted for ongoing management. Podiatry consulted. 8/17/18: I&D right foot; 5th toe amputation ; ID consulted. 8/18/18: Cont vanc and zosyn. Leukocytosis improved. Wound care consulted for left medial heel wound. Await cultures-defer antibx regimen to ID. Subjective:      NAD. Objective:      Visit Vitals    /70 (BP 1 Location: Left arm, BP Patient Position: Head of bed elevated (Comment degrees))    Pulse 92    Temp 98.4 °F (36.9 °C)    Resp 16    Ht 6' 3\" (1.905 m)    Wt 81.2 kg (179 lb)    SpO2 96%    BMI 22.37 kg/m2           Physical Exam:  General appearance: alert, cooperative, no distress  Lungs: clear to auscultation bilaterally  Heart: regular rate and rhythm, S1, S2 normal, no murmur, click, rub or gallop  Abdomen: soft, non tender, non distended. Normoactive bowel sounds.    Extremities: Dressing intact right foot; 2/5 toes amputated R    Skin: left medial heel wound present  Neurologic: Grossly normal  PSY: Mood and affect normal, appropriately behaved      Intake and Output:  Current Shift:     Last three shifts:  08/16 1901 - 08/18 0700  In: 2900 [P.O.:1550;  I.V.:1350]  Out: 700 [Urine:700]    Recent Results (from the past 24 hour(s))   GLUCOSE, POC    Collection Time: 08/17/18 10:32 AM   Result Value Ref Range    Glucose (POC) 256 (H) 70 - 110 mg/dL   CULTURE, WOUND W GRAM STAIN    Collection Time: 08/17/18 11:00 AM   Result Value Ref Range    Special Requests: NO SPECIAL REQUESTS      GRAM STAIN MANY WBC'S      GRAM STAIN MANY GRAM POSITIVE COCCI IN PAIRS IN GROUPS      GRAM STAIN MODERATE GRAM NEGATIVE RODS      Culture result: PENDING    GLUCOSE, POC    Collection Time: 08/17/18 12:15 PM   Result Value Ref Range    Glucose (POC) 189 (H) 70 - 110 mg/dL   GLUCOSE, POC    Collection Time: 08/17/18  5:03 PM   Result Value Ref Range    Glucose (POC) 323 (H) 70 - 110 mg/dL   GLUCOSE, POC    Collection Time: 08/17/18  9:18 PM   Result Value Ref Range    Glucose (POC) 242 (H) 70 - 110 mg/dL   CBC W/O DIFF    Collection Time: 08/18/18  4:47 AM   Result Value Ref Range    WBC 20.1 (H) 4.6 - 13.2 K/uL    RBC 3.52 (L) 4.70 - 5.50 M/uL    HGB 9.8 (L) 13.0 - 16.0 g/dL    HCT 29.0 (L) 36.0 - 48.0 %    MCV 82.4 74.0 - 97.0 FL    MCH 27.8 24.0 - 34.0 PG    MCHC 33.8 31.0 - 37.0 g/dL    RDW 13.1 11.6 - 14.5 %    PLATELET 289 (H) 327 - 420 K/uL    MPV 9.5 9.2 - 32.4 FL   METABOLIC PANEL, BASIC    Collection Time: 08/18/18  4:47 AM   Result Value Ref Range    Sodium 133 (L) 136 - 145 mmol/L    Potassium 4.0 3.5 - 5.5 mmol/L    Chloride 97 (L) 100 - 108 mmol/L    CO2 27 21 - 32 mmol/L    Anion gap 9 3.0 - 18 mmol/L    Glucose 353 (H) 74 - 99 mg/dL    BUN 7 7.0 - 18 MG/DL    Creatinine 0.90 0.6 - 1.3 MG/DL    BUN/Creatinine ratio 8 (L) 12 - 20      GFR est AA >60 >60 ml/min/1.73m2    GFR est non-AA >60 >60 ml/min/1.73m2    Calcium 7.8 (L) 8.5 - 10.1 MG/DL   GLUCOSE, POC    Collection Time: 08/18/18  7:49 AM   Result Value Ref Range    Glucose (POC) 413 (HH) 70 - 110 mg/dL           Lab Results   Component Value Date/Time    Glucose 353 (H) 08/18/2018 04:47 AM Glucose 294 (H) 08/17/2018 03:50 AM    Glucose 267 (H) 08/16/2018 07:45 PM        Assessment/Plan:     Patient Active Problem List   Diagnosis Code    Type 1 diabetes mellitus without complication ([facility] U98.5    Osteomyelitis ([facility] M86.9       A/P:    S/p I&D R foot; 5th toe amputation   -pain meds prn   -podiatry & ID following-appreciate  -vanc/zosyn  (per ID duration of abx if all infected bone has been removed will be until no further surgical debridement is needed)   -awaiting cultures-defer further antibx to ID   -dressing changes- defer to podiatry     DM II  -accuchecks  -SSI  -lantus   -diabetic diet     Wound care consulted for left medial heel wound.        JAMES [address]  [phone]  Office:  [phone]

## 2018-08-18 NOTE — PROGRESS NOTES
Problem: Falls - Risk of  Goal: *Absence of Falls  Document Baljinder Fall Risk and appropriate interventions in the flowsheet. Outcome: Progressing Towards Goal  Fall Risk Interventions:  Mobility Interventions: Patient to call before getting OOB         Medication Interventions: Patient to call before getting OOB    Elimination Interventions: Call light in reach             Problem: Pressure Injury - Risk of  Goal: *Prevention of pressure injury  Document Jonathan Scale and appropriate interventions in the flowsheet.    Outcome: Progressing Towards Goal  Pressure Injury Interventions:  Sensory Interventions: Assess changes in LOC    Moisture Interventions: Absorbent underpads    Activity Interventions: Increase time out of bed         Nutrition Interventions: Document food/fluid/supplement intake

## 2018-08-18 NOTE — PROGRESS NOTES
Problem: Falls - Risk of  Goal: *Absence of Falls  Document Baljinder Fall Risk and appropriate interventions in the flowsheet. Outcome: Progressing Towards Goal  Fall Risk Interventions:  Mobility Interventions: Patient to call before getting OOB         Medication Interventions: Patient to call before getting OOB    Elimination Interventions: Call light in reach             Problem: Pressure Injury - Risk of  Goal: *Prevention of pressure injury  Document Jonathan Scale and appropriate interventions in the flowsheet.    Pressure Injury Interventions:  Sensory Interventions: Assess changes in LOC    Moisture Interventions: Absorbent underpads    Activity Interventions: Increase time out of bed         Nutrition Interventions: Document food/fluid/supplement intake

## 2018-08-18 NOTE — PROGRESS NOTES
Pharmacy Dosing Services: Vancomycin    Indication: osteomyelitis, cellulitis    Day of therapy: 3    Other Antimicrobials (Include dose, start day & day of therapy):  Zosyn 3.375 g IV E.I. Q8h (modified from 4.5 g, low risk for Pseudomonal infection)    Loading dose (date given): 2 g  Current Maintenance dose: 1.25 g iv q8h    Goal Vancomycin Level: 15-20  (Trough 15-20 for most infections, 20 for meningitis/osteomyelitis, pre-HD level ~25)    Vancomycin Level (if drawn): 13.8 (expected since loading dose missed)     Significant Cultures:   Blood: neg  Wound: GNR pending  Anaerobe: pending     Renal function stable? (unstable defined as SCr increase of 0.5 mg/dL or > 50% increase from baseline, whichever is greater) (Y/N): Y     CAPD, Hemodialysis or Renal Replacement Therapy (Y/N): N     Recent Labs      18   0447  18   0350  18   1945   CREA  0.90  1.14  1.09   BUN  7  11  10   WBC  20.1*  28.3*  32.5*     Temp (24hrs), Av.2 °F (36.8 °C), Min:97.2 °F (36.2 °C), Max:98.9 °F (37.2 °C)    Creatinine Clearance (Creatinine Clearance (ml/min)): 131.6     Regimen assessment: subtherapeutic but expected to reach therapeutic tomorrow due to initial dose incompletely loaded. Maintenance dose: continue same dose  Next scheduled level: 1330       Pharmacy will follow daily and adjust medications as appropriate for renal function and/or serum levels.     Thank you,  Eulis Curling PhD

## 2018-08-19 PROBLEM — I96 GANGRENE OF TOE (HCC): Status: ACTIVE | Noted: 2018-08-19

## 2018-08-19 PROBLEM — M72.6 NECROTIZING FASCIITIS DUE TO MICROORGANISM (HCC): Status: ACTIVE | Noted: 2018-08-19

## 2018-08-19 LAB
ANION GAP SERPL CALC-SCNC: 8 MMOL/L (ref 3–18)
BUN SERPL-MCNC: 5 MG/DL (ref 7–18)
BUN/CREAT SERPL: 6 (ref 12–20)
CALCIUM SERPL-MCNC: 8.1 MG/DL (ref 8.5–10.1)
CHLORIDE SERPL-SCNC: 97 MMOL/L (ref 100–108)
CO2 SERPL-SCNC: 31 MMOL/L (ref 21–32)
CREAT SERPL-MCNC: 0.81 MG/DL (ref 0.6–1.3)
DATE LAST DOSE: NORMAL
GLUCOSE BLD STRIP.AUTO-MCNC: 121 MG/DL (ref 70–110)
GLUCOSE BLD STRIP.AUTO-MCNC: 126 MG/DL (ref 70–110)
GLUCOSE BLD STRIP.AUTO-MCNC: 164 MG/DL (ref 70–110)
GLUCOSE BLD STRIP.AUTO-MCNC: 181 MG/DL (ref 70–110)
GLUCOSE SERPL-MCNC: 230 MG/DL (ref 74–99)
POTASSIUM SERPL-SCNC: 3.9 MMOL/L (ref 3.5–5.5)
REPORTED DOSE,DOSE: NORMAL UNITS
REPORTED DOSE/TIME,TMG: 600
SODIUM SERPL-SCNC: 136 MMOL/L (ref 136–145)
VANCOMYCIN TROUGH SERPL-MCNC: 10.4 UG/ML (ref 10–20)

## 2018-08-19 PROCEDURE — 74011250636 HC RX REV CODE- 250/636: Performed by: HOSPITALIST

## 2018-08-19 PROCEDURE — 82962 GLUCOSE BLOOD TEST: CPT

## 2018-08-19 PROCEDURE — 74011000258 HC RX REV CODE- 258: Performed by: INTERNAL MEDICINE

## 2018-08-19 PROCEDURE — 80202 ASSAY OF VANCOMYCIN: CPT | Performed by: HOSPITALIST

## 2018-08-19 PROCEDURE — 74011636637 HC RX REV CODE- 636/637: Performed by: NURSE PRACTITIONER

## 2018-08-19 PROCEDURE — 36415 COLL VENOUS BLD VENIPUNCTURE: CPT | Performed by: HOSPITALIST

## 2018-08-19 PROCEDURE — 80048 BASIC METABOLIC PNL TOTAL CA: CPT | Performed by: HOSPITALIST

## 2018-08-19 PROCEDURE — 65270000029 HC RM PRIVATE

## 2018-08-19 PROCEDURE — 74011250636 HC RX REV CODE- 250/636: Performed by: INTERNAL MEDICINE

## 2018-08-19 RX ORDER — INSULIN LISPRO 100 [IU]/ML
INJECTION, SOLUTION INTRAVENOUS; SUBCUTANEOUS
Status: DISCONTINUED | OUTPATIENT
Start: 2018-08-19 | End: 2018-08-23 | Stop reason: HOSPADM

## 2018-08-19 RX ORDER — INSULIN GLARGINE 100 [IU]/ML
16 INJECTION, SOLUTION SUBCUTANEOUS EVERY 12 HOURS
Status: DISCONTINUED | OUTPATIENT
Start: 2018-08-19 | End: 2018-08-20

## 2018-08-19 RX ORDER — VANCOMYCIN/0.9 % SOD CHLORIDE 1.5G/250ML
1500 PLASTIC BAG, INJECTION (ML) INTRAVENOUS EVERY 8 HOURS
Status: DISCONTINUED | OUTPATIENT
Start: 2018-08-19 | End: 2018-08-20

## 2018-08-19 RX ADMIN — INSULIN GLARGINE 16 UNITS: 100 INJECTION, SOLUTION SUBCUTANEOUS at 23:11

## 2018-08-19 RX ADMIN — HYDROMORPHONE HYDROCHLORIDE 1 MG: 1 INJECTION, SOLUTION INTRAMUSCULAR; INTRAVENOUS; SUBCUTANEOUS at 18:34

## 2018-08-19 RX ADMIN — PIPERACILLIN SODIUM,TAZOBACTAM SODIUM 3.38 G: 3; .375 INJECTION, POWDER, FOR SOLUTION INTRAVENOUS at 18:33

## 2018-08-19 RX ADMIN — HYDROMORPHONE HYDROCHLORIDE 1 MG: 1 INJECTION, SOLUTION INTRAMUSCULAR; INTRAVENOUS; SUBCUTANEOUS at 13:41

## 2018-08-19 RX ADMIN — HYDROMORPHONE HYDROCHLORIDE 1 MG: 1 INJECTION, SOLUTION INTRAMUSCULAR; INTRAVENOUS; SUBCUTANEOUS at 09:22

## 2018-08-19 RX ADMIN — VANCOMYCIN HYDROCHLORIDE 1500 MG: 10 INJECTION, POWDER, LYOPHILIZED, FOR SOLUTION INTRAVENOUS at 23:11

## 2018-08-19 RX ADMIN — INSULIN LISPRO 3 UNITS: 100 INJECTION, SOLUTION INTRAVENOUS; SUBCUTANEOUS at 09:26

## 2018-08-19 RX ADMIN — INSULIN GLARGINE 16 UNITS: 100 INJECTION, SOLUTION SUBCUTANEOUS at 09:28

## 2018-08-19 RX ADMIN — HYDROMORPHONE HYDROCHLORIDE 1 MG: 1 INJECTION, SOLUTION INTRAMUSCULAR; INTRAVENOUS; SUBCUTANEOUS at 23:10

## 2018-08-19 RX ADMIN — PIPERACILLIN SODIUM,TAZOBACTAM SODIUM 3.38 G: 3; .375 INJECTION, POWDER, FOR SOLUTION INTRAVENOUS at 02:23

## 2018-08-19 RX ADMIN — SODIUM CHLORIDE 1250 MG: 900 INJECTION, SOLUTION INTRAVENOUS at 04:41

## 2018-08-19 RX ADMIN — SODIUM CHLORIDE 1250 MG: 900 INJECTION, SOLUTION INTRAVENOUS at 16:37

## 2018-08-19 RX ADMIN — HYDROMORPHONE HYDROCHLORIDE 1 MG: 1 INJECTION, SOLUTION INTRAMUSCULAR; INTRAVENOUS; SUBCUTANEOUS at 02:23

## 2018-08-19 RX ADMIN — INSULIN LISPRO 3 UNITS: 100 INJECTION, SOLUTION INTRAVENOUS; SUBCUTANEOUS at 13:36

## 2018-08-19 RX ADMIN — PIPERACILLIN SODIUM,TAZOBACTAM SODIUM 3.38 G: 3; .375 INJECTION, POWDER, FOR SOLUTION INTRAVENOUS at 09:08

## 2018-08-19 NOTE — PROGRESS NOTES
2000-no signs of distress. Ordered meds given throughout night. Patient expressed he requested wound care team to assess left foot for preventative maintenance. Patient slept majority of the night. Bedside shift change report given to JEN Hartman (oncoming nurse) by Guerita Moody (offgoing nurse). Report included the following information SBAR.

## 2018-08-19 NOTE — PROGRESS NOTES
[facility]  Hospitalist Division           Inpatient Daily Progress Note        Patient: Tal Lerma MRN: [id number]  CSN: [id number]    YOB: 1985  Age: 26 y.o. Sex: male    DOA: 8/16/2018 LOS:  LOS: 3 days                    Chief Complaint:  Osteomyelitis right 5th toe with wet gangrene    Interval History:      Tal Lerma is a 26 y.o. male who presented to the ER with gangrenous changes involving his right 5th toe. He reports that this has been worsening for about 2 weeks. He recently was able to sign a lease for a place to live and had been in a shelter prior to that. He had trouble with his feet getting wet and not able to dry out a lot. There has been pain involving the toe. No fever. He lost the second toe on the same foot previously. He had wet gangrene of the toe and will be admitted for ongoing management. Podiatry consulted. 8/17/18: I&D right foot; 5th toe amputation ; ID consulted. 8/18/18: Cont vanc and zosyn. Leukocytosis improved. Wound care consulted for left medial heel wound. Await cultures-defer antibx regimen to ID.   8/19/18: As above. Increase lantus. Subjective:      NAD. Objective:      Visit Vitals    /76    Pulse 88    Temp 98.3 °F (36.8 °C)    Resp 16    Ht 6' 3\" (1.905 m)    Wt 81.2 kg (179 lb)    SpO2 98%    BMI 22.37 kg/m2           Physical Exam:  General appearance: alert, cooperative, no distress  Lungs: clear to auscultation bilaterally  Heart: regular rate and rhythm, S1, S2 normal, no murmur, click, rub or gallop  Abdomen: soft, non tender, non distended. Normoactive bowel sounds. Extremities: Dressing intact right foot; 2/5 toes amputated R    Skin: left medial heel wound present  Neurologic: Grossly normal  PSY: Mood and affect normal, appropriately behaved      Intake and Output:  Current Shift:     Last three shifts:  08/17 1901 - 08/19 0700  In: 1600 [P.O.:1000;  I.V.:600]  Out: - Recent Results (from the past 24 hour(s))   GLUCOSE, POC    Collection Time: 08/18/18  7:49 AM   Result Value Ref Range    Glucose (POC) 413 (HH) 70 - 110 mg/dL   GLUCOSE, POC    Collection Time: 08/18/18 11:17 AM   Result Value Ref Range    Glucose (POC) 295 (H) 70 - 110 mg/dL   VANCOMYCIN, TROUGH    Collection Time: 08/18/18 11:25 AM   Result Value Ref Range    Vancomycin,trough 13.8 10.0 - 20.0 ug/mL    Reported dose date: 20180818      Reported dose time: 400     GLUCOSE, POC    Collection Time: 08/18/18  4:42 PM   Result Value Ref Range    Glucose (POC) 260 (H) 70 - 110 mg/dL   GLUCOSE, POC    Collection Time: 08/18/18  9:50 PM   Result Value Ref Range    Glucose (POC) 229 (H) 70 - 911 mg/dL   METABOLIC PANEL, BASIC    Collection Time: 08/19/18  4:50 AM   Result Value Ref Range    Sodium 136 136 - 145 mmol/L    Potassium 3.9 3.5 - 5.5 mmol/L    Chloride 97 (L) 100 - 108 mmol/L    CO2 31 21 - 32 mmol/L    Anion gap 8 3.0 - 18 mmol/L    Glucose 230 (H) 74 - 99 mg/dL    BUN 5 (L) 7.0 - 18 MG/DL    Creatinine 0.81 0.6 - 1.3 MG/DL    BUN/Creatinine ratio 6 (L) 12 - 20      GFR est AA >60 >60 ml/min/1.73m2    GFR est non-AA >60 >60 ml/min/1.73m2    Calcium 8.1 (L) 8.5 - 10.1 MG/DL           Lab Results   Component Value Date/Time    Glucose 230 (H) 08/19/2018 04:50 AM    Glucose 353 (H) 08/18/2018 04:47 AM    Glucose 294 (H) 08/17/2018 03:50 AM    Glucose 267 (H) 08/16/2018 07:45 PM        Assessment/Plan:     Patient Active Problem List   Diagnosis Code    Type 1 diabetes mellitus without complication (HCC) F72.4    Osteomyelitis ([facility]) M86.9       A/P:    S/p I&D R foot; 5th toe amputation   -pain meds prn   -podiatry & ID following-appreciate  -vanc/zosyn  (per ID duration of abx if all infected bone has been removed will be until no further surgical debridement is needed)   -awaiting cultures-defer further antibx to ID   -dressing changes- defer to podiatry     DM II  -accuchecks  -SSI  -lantus 16 U -diabetic diet     Wound care consulted for left medial heel wound.        JAMES [facility]  [phone]  Office:  [phone]

## 2018-08-19 NOTE — PROGRESS NOTES
POST OP Note    Assessment:     POD#2    S/p right 5th toe amputation and incision and drainage right foot necrotizing fasciitis      Patient Active Problem List   Diagnosis Code    Type 1 diabetes mellitus without complication ([facility] E82.0    [facility] M86.9    Necrotizing fasciitis due to microorganism ([facility] M72.6    Gangrene of toe ([facility] I96       Plan:     WBC trending down and patient feels better- positive signs    Wound inspected, flushed, cleansed and redressed    Abx per ID    Continue elevation and minimal WB for transfer only    Patient made aware he will need additional surgery including possible washout, debridement, further amputation, and/or grafting. Subjective:     Patient resting comfortably. Feels much better overall since having surgery. Denies fevers, chills, nausea, vomiting. He states night sweats only when BS not controlled.       No Known Allergies    Current Facility-Administered Medications   Medication Dose Route Frequency    insulin glargine (LANTUS) injection 16 Units  16 Units SubCUTAneous Q12H    insulin lispro (HUMALOG) injection   SubCUTAneous AC&HS    piperacillin-tazobactam (ZOSYN) 3.375 g in  ml ##EXTENDED 4HRS INFUSION  3.375 g IntraVENous Q8H    oxyCODONE IR (ROXICODONE) tablet 5-10 mg  5-10 mg Oral Q4H PRN    HYDROmorphone (DILAUDID) syringe 1 mg  1 mg IntraVENous Q4H PRN    sodium chloride (NS) flush 5-10 mL  5-10 mL IntraVENous PRN    glucose chewable tablet 16 g  4 Tab Oral PRN    glucagon (GLUCAGEN) injection 1 mg  1 mg IntraMUSCular PRN    dextrose (D50W) injection syrg 12.5-25 g  25-50 mL IntraVENous PRN    vancomycin (VANCOCIN) 1,250 mg in 0.9% sodium chloride 250 mL IVPB  1,250 mg IntraVENous Q8H    VANCOMYCIN INFORMATION NOTE   Other Rx Dosing/Monitoring       Past Medical History:   Diagnosis Date    Diabetes ([facility] 1998     Past Surgical History:   Procedure Laterality Date    HX AMPUTATION TOE      left and right foot     Family History   Problem Relation Age of Onset    No Known Problems Mother     Heart Attack Father      Social History     Social History    Marital status: SINGLE     Spouse name: N/A    Number of children: N/A    Years of education: N/A     Occupational History    Not on file. Social History Main Topics    Smoking status: Current Every Day Smoker     Packs/day: 0.50    Smokeless tobacco: Never Used    Alcohol use No    Drug use: No    Sexual activity: Not on file     Other Topics Concern    Not on file     Social History Narrative       Physical Exam:      Vitals:    08/18/18 1559 08/18/18 2150 08/18/18 2330 08/19/18 0500   BP: 116/73  121/79 124/76   Pulse: 82  87 88   Resp: 18  18 16   Temp: 98.7 °F (37.1 °C)  98.3 °F (36.8 °C) 98.3 °F (36.8 °C)   SpO2: 97%  97% 98%   Weight:  81.2 kg (179 lb)     Height:           OBJECTIVE:    Patient is alert, cooperative, smiling, pleasant and in no apparent distress. Lower Extremity Exam:     Vascular: Dorsalis Pedis 2/4 and Posterior Tibial 2/4 Bilaterally. Pedal hair is   Decreased. There  are not varicosities present. Pedal edema is moderate right foot and decreased greatly since surgery    Neurological:  Light touch protective sensation is decreased to both feet. There are non reproducible paresthesias to bilateral lower extremities. There are no Tinel's or 's signs present to the nerves crossing the ankle joint. Orthopedic:  Gross abnormalities present with multiple digital amputations. Manual muscle testing is 5/5 right and 5/5 left for all remaining groups traversing the ankles. Dermatological:   OPEN amputation site with exposed dermis, subcutaneous tissue, tendon, and bone (5th metatarsal head). No purulence or malodor. Great decrease in erythema and warm.     All Micro Results     Procedure Component Value Units Date/Time    CULTURE, Elijah Basque STAIN [[id number]]  (Abnormal) Collected:  08/17/18 1100    Order Status:  Completed Specimen: Wound from Foot Updated:  08/19/18 1308     Special Requests: NO SPECIAL REQUESTS        GRAM STAIN MANY WBC'S                 MANY GRAM POSITIVE COCCI IN PAIRS IN GROUPS              MODERATE GRAM NEGATIVE RODS     Culture result:         MODERATE GRAM NEGATIVE RODS (A)              MODERATE POSSIBLE 2ND GRAM NEGATIVE FANTA (A)      FEW PROTEUS SPECIES (A)                 MODERATE STREPTOCOCCI, BETA HEMOLYTIC GROUP A (A)              MANY STREPTOCOCCUS VIRIDANS (A)    CULTURE, BLOOD [[id number]] Collected:  08/16/18 1945    Order Status:  Completed Specimen:  Blood from Blood Updated:  08/19/18 0838     Special Requests: NO SPECIAL REQUESTS        Culture result: NO GROWTH 3 DAYS       CULTURE, BLOOD [[id number]] Collected:  08/16/18 1945    Order Status:  Completed Specimen:  Blood from Blood Updated:  08/19/18 0838     Special Requests: NO SPECIAL REQUESTS        Culture result: NO GROWTH 3 DAYS       CULTURE, ANAEROBIC [[id number]] Collected:  08/17/18 1100    Order Status:  Completed Specimen:  Wound Drainage Updated:  08/18/18 1130     Special Requests: NO SPECIAL REQUESTS        Culture result:         CULTURE IN PROGRESS,FURTHER UPDATES TO FOLLOW    CULTURE, WOUND Willistine Fam STAIN [[id number]]     Order Status:  Canceled Specimen:  Wound from Foot     CULTURE, ANAEROBIC [[id number]]     Order Status:  Canceled Specimen:  Wound Drainage             Recent Results (from the past 24 hour(s))   GLUCOSE, POC    Collection Time: 08/18/18  4:42 PM   Result Value Ref Range    Glucose (POC) 260 (H) 70 - 110 mg/dL   GLUCOSE, POC    Collection Time: 08/18/18  9:50 PM   Result Value Ref Range    Glucose (POC) 229 (H) 70 - 280 mg/dL   METABOLIC PANEL, BASIC    Collection Time: 08/19/18  4:50 AM   Result Value Ref Range    Sodium 136 136 - 145 mmol/L    Potassium 3.9 3.5 - 5.5 mmol/L    Chloride 97 (L) 100 - 108 mmol/L    CO2 31 21 - 32 mmol/L    Anion gap 8 3.0 - 18 mmol/L    Glucose 230 (H) 74 - 99 mg/dL    BUN 5 (L) 7.0 - 18 MG/DL    Creatinine 0.81 0.6 - 1.3 MG/DL    BUN/Creatinine ratio 6 (L) 12 - 20      GFR est AA >60 >60 ml/min/1.73m2    GFR est non-AA >60 >60 ml/min/1.73m2    Calcium 8.1 (L) 8.5 - 10.1 MG/DL   GLUCOSE, POC    Collection Time: 08/19/18  9:07 AM   Result Value Ref Range    Glucose (POC) 181 (H) 70 - 110 mg/dL   GLUCOSE, POC    Collection Time: 08/19/18 12:49 PM   Result Value Ref Range    Glucose (POC) 164 (H) 70 - 110 mg/dL   VANCOMYCIN, TROUGH    Collection Time: 08/19/18  1:51 PM   Result Value Ref Range    Vancomycin,trough 10.4 10.0 - 20.0 ug/mL    Reported dose date: 20180819      Reported dose time: 600      Reported dose: 1250 MG UNITS         NARA Mabry DPM, [facility]  [phone]   8/19/2018

## 2018-08-19 NOTE — PROGRESS NOTES
Pharmacy Dosing Services: Vancomycin    Indication: osteomyelitis, cellulitis    Day of therapy: 4    Other Antimicrobials (Include dose, start day & day of therapy):  Zosyn 3.375 g IV E.I. Q8h (modified from 4.5 g, low risk for Pseudomonal infection)    Loading dose (date given): 2 g  Current Maintenance dose: 1.25 g iv q8h    Goal Vancomycin Level: 15-20  (Trough 15-20 for most infections, 20 for meningitis/osteomyelitis, pre-HD level ~25)    Vancomycin Level (if drawn): 10.4 (9 hrs post)     Significant Cultures:   Blood: neg  Wound: GNR, GAS, Strep. V., pending  Anaerobe: pending     Renal function stable? (unstable defined as SCr increase of 0.5 mg/dL or > 50% increase from baseline, whichever is greater) (Y/N): Y     CAPD, Hemodialysis or Renal Replacement Therapy (Y/N): N     Recent Labs      18   0450  18   0447  18   0350  18   1945   CREA  0.81  0.90  1.14  1.09   BUN  5*  7  11  10   WBC   --   20.1*  28.3*  32.5*     Temp (24hrs), Av.3 °F (36.8 °C), Min:98.3 °F (36.8 °C), Max:98.3 °F (36.8 °C)    Creatinine Clearance (Creatinine Clearance (ml/min)): 146.2    Regimen assessment: subtherapeutic  Maintenance dose: increase to 1.5g iv q8h  Next scheduled level: , Jim Ng will follow daily and adjust medications as appropriate for renal function and/or serum levels.     Thank you,  Reuben Mendieta PhD

## 2018-08-20 ENCOUNTER — ANESTHESIA EVENT (OUTPATIENT)
Dept: SURGERY | Age: 33
DRG: 853 | End: 2018-08-20
Payer: SELF-PAY

## 2018-08-20 ENCOUNTER — APPOINTMENT (OUTPATIENT)
Dept: GENERAL RADIOLOGY | Age: 33
DRG: 853 | End: 2018-08-20
Attending: PODIATRIST
Payer: SELF-PAY

## 2018-08-20 LAB
BACTERIA SPEC CULT: ABNORMAL
ERYTHROCYTE [DISTWIDTH] IN BLOOD BY AUTOMATED COUNT: 12.8 % (ref 11.6–14.5)
GLUCOSE BLD STRIP.AUTO-MCNC: 133 MG/DL (ref 70–110)
GLUCOSE BLD STRIP.AUTO-MCNC: 166 MG/DL (ref 70–110)
GLUCOSE BLD STRIP.AUTO-MCNC: 220 MG/DL (ref 70–110)
GLUCOSE BLD STRIP.AUTO-MCNC: 243 MG/DL (ref 70–110)
GLUCOSE BLD STRIP.AUTO-MCNC: 88 MG/DL (ref 70–110)
GRAM STN SPEC: ABNORMAL
HCT VFR BLD AUTO: 28.7 % (ref 36–48)
HGB BLD-MCNC: 9.7 G/DL (ref 13–16)
MCH RBC QN AUTO: 27.6 PG (ref 24–34)
MCHC RBC AUTO-ENTMCNC: 33.8 G/DL (ref 31–37)
MCV RBC AUTO: 81.8 FL (ref 74–97)
PLATELET # BLD AUTO: 502 K/UL (ref 135–420)
PMV BLD AUTO: 9.3 FL (ref 9.2–11.8)
RBC # BLD AUTO: 3.51 M/UL (ref 4.7–5.5)
SERVICE CMNT-IMP: ABNORMAL
WBC # BLD AUTO: 10.4 K/UL (ref 4.6–13.2)

## 2018-08-20 PROCEDURE — 65270000029 HC RM PRIVATE

## 2018-08-20 PROCEDURE — 74011000258 HC RX REV CODE- 258: Performed by: INTERNAL MEDICINE

## 2018-08-20 PROCEDURE — 82962 GLUCOSE BLOOD TEST: CPT

## 2018-08-20 PROCEDURE — 85027 COMPLETE CBC AUTOMATED: CPT | Performed by: NURSE PRACTITIONER

## 2018-08-20 PROCEDURE — 77030018836 HC SOL IRR NACL ICUM -A

## 2018-08-20 PROCEDURE — 36415 COLL VENOUS BLD VENIPUNCTURE: CPT | Performed by: NURSE PRACTITIONER

## 2018-08-20 PROCEDURE — 74011636637 HC RX REV CODE- 636/637: Performed by: NURSE PRACTITIONER

## 2018-08-20 PROCEDURE — 74011250636 HC RX REV CODE- 250/636: Performed by: HOSPITALIST

## 2018-08-20 PROCEDURE — 73620 X-RAY EXAM OF FOOT: CPT

## 2018-08-20 PROCEDURE — 74011250636 HC RX REV CODE- 250/636: Performed by: INTERNAL MEDICINE

## 2018-08-20 RX ORDER — SODIUM CHLORIDE, SODIUM LACTATE, POTASSIUM CHLORIDE, CALCIUM CHLORIDE 600; 310; 30; 20 MG/100ML; MG/100ML; MG/100ML; MG/100ML
25 INJECTION, SOLUTION INTRAVENOUS CONTINUOUS
Status: DISCONTINUED | OUTPATIENT
Start: 2018-08-20 | End: 2018-08-21 | Stop reason: HOSPADM

## 2018-08-20 RX ORDER — INSULIN GLARGINE 100 [IU]/ML
10 INJECTION, SOLUTION SUBCUTANEOUS EVERY 12 HOURS
Status: DISCONTINUED | OUTPATIENT
Start: 2018-08-21 | End: 2018-08-22

## 2018-08-20 RX ORDER — FAMOTIDINE 20 MG/1
20 TABLET, FILM COATED ORAL ONCE
Status: DISPENSED | OUTPATIENT
Start: 2018-08-20 | End: 2018-08-21

## 2018-08-20 RX ORDER — INSULIN LISPRO 100 [IU]/ML
INJECTION, SOLUTION INTRAVENOUS; SUBCUTANEOUS ONCE
Status: DISCONTINUED | OUTPATIENT
Start: 2018-08-20 | End: 2018-08-20

## 2018-08-20 RX ADMIN — VANCOMYCIN HYDROCHLORIDE 1500 MG: 10 INJECTION, POWDER, LYOPHILIZED, FOR SOLUTION INTRAVENOUS at 10:03

## 2018-08-20 RX ADMIN — INSULIN LISPRO 6 UNITS: 100 INJECTION, SOLUTION INTRAVENOUS; SUBCUTANEOUS at 12:36

## 2018-08-20 RX ADMIN — HYDROMORPHONE HYDROCHLORIDE 1 MG: 1 INJECTION, SOLUTION INTRAMUSCULAR; INTRAVENOUS; SUBCUTANEOUS at 04:19

## 2018-08-20 RX ADMIN — HYDROMORPHONE HYDROCHLORIDE 1 MG: 1 INJECTION, SOLUTION INTRAMUSCULAR; INTRAVENOUS; SUBCUTANEOUS at 15:49

## 2018-08-20 RX ADMIN — HYDROMORPHONE HYDROCHLORIDE 1 MG: 1 INJECTION, SOLUTION INTRAMUSCULAR; INTRAVENOUS; SUBCUTANEOUS at 20:39

## 2018-08-20 RX ADMIN — HYDROMORPHONE HYDROCHLORIDE 1 MG: 1 INJECTION, SOLUTION INTRAMUSCULAR; INTRAVENOUS; SUBCUTANEOUS at 10:03

## 2018-08-20 RX ADMIN — PIPERACILLIN SODIUM,TAZOBACTAM SODIUM 3.38 G: 3; .375 INJECTION, POWDER, FOR SOLUTION INTRAVENOUS at 09:05

## 2018-08-20 RX ADMIN — PIPERACILLIN SODIUM,TAZOBACTAM SODIUM 3.38 G: 3; .375 INJECTION, POWDER, FOR SOLUTION INTRAVENOUS at 00:49

## 2018-08-20 RX ADMIN — PIPERACILLIN SODIUM,TAZOBACTAM SODIUM 3.38 G: 3; .375 INJECTION, POWDER, FOR SOLUTION INTRAVENOUS at 17:45

## 2018-08-20 RX ADMIN — INSULIN LISPRO 6 UNITS: 100 INJECTION, SOLUTION INTRAVENOUS; SUBCUTANEOUS at 23:32

## 2018-08-20 RX ADMIN — INSULIN GLARGINE 16 UNITS: 100 INJECTION, SOLUTION SUBCUTANEOUS at 20:32

## 2018-08-20 RX ADMIN — INSULIN GLARGINE 16 UNITS: 100 INJECTION, SOLUTION SUBCUTANEOUS at 09:05

## 2018-08-20 NOTE — PROGRESS NOTES
Problem: Falls - Risk of  Goal: *Absence of Falls  Document Baljinder Fall Risk and appropriate interventions in the flowsheet. Outcome: Progressing Towards Goal  Fall Risk Interventions:  Mobility Interventions: Patient to call before getting OOB         Medication Interventions: Evaluate medications/consider consulting pharmacy    Elimination Interventions: Call light in reach             Problem: Pressure Injury - Risk of  Goal: *Prevention of pressure injury  Document Jonathan Scale and appropriate interventions in the flowsheet.    Outcome: Progressing Towards Goal  Pressure Injury Interventions:  Sensory Interventions: Assess changes in LOC    Moisture Interventions: Absorbent underpads    Activity Interventions: Increase time out of bed         Nutrition Interventions: Document food/fluid/supplement intake

## 2018-08-20 NOTE — PROGRESS NOTES
2300 -- Bedside, Verbal and Written shift change report given to [address] (oncoming nurse) by CRISTINA (offgoing nurse). Report included the following information SBAR, Kardex, Intake/Output, MAR and Recent Results.       2310 -- PM and PRN pain medications administered, pt tolerated with ease, will continue to monitor.     0420  -- PRN pain medications administered, pt tolerated with ease, will continue to monitor. 0500 -- Shift reassessment, pt condition unchanged, will continue to monitor.        -- Bedside, Verbal and Written shift change report given to   (oncoming nurse) by LACY (offgoing nurse). Report included the following information SBAR, Kardex, Intake/Output, MAR and Recent Results. Skin assessment completed.

## 2018-08-20 NOTE — PROGRESS NOTES
[facility]  Hospitalist Division           Inpatient Daily Progress Note        Patient: Tate Caraballo MRN: [id number]  [facility]: [id number]    YOB: 1985  Age: 26 y.o. Sex: male    DOA: 8/16/2018 LOS:  LOS: 4 days                    Chief Complaint:  Osteomyelitis right 5th toe with wet gangrene    Interval History:      Tate Caraballo is a 26 y.o. male who presented to the ER with gangrenous changes involving his right 5th toe. He reports that this has been worsening for about 2 weeks. He recently was able to sign a lease for a place to live and had been in a shelter prior to that. He had trouble with his feet getting wet and not able to dry out a lot. There has been pain involving the toe. No fever. He lost the second toe on the same foot previously. He had wet gangrene of the toe and will be admitted for ongoing management. Podiatry consulted. 8/17/18: I&D right foot; 5th toe amputation ; ID consulted. 8/18/18: Cont vanc and zosyn. Leukocytosis improved. Wound care consulted for left medial heel wound. Await cultures-defer antibx regimen to ID.   8/19/18: As above. Increase lantus. 8/20/18: better glycemic control today w/ lantus increase. Podiatry to proceed with excisional debridement of soft tissue and bone right foot 8/21/18 @ 730. NPO after MN. WBCs down to 10.4; afebrile-continue current therapy  Subjective:      NAD. Podiatry at bedside. Objective:      Visit Vitals    /79 (BP 1 Location: Right arm, BP Patient Position: At rest)    Pulse 81    Temp 98.1 °F (36.7 °C)    Resp 18    Ht 6' 3\" (1.905 m)    Wt 80.7 kg (178 lb)    SpO2 100%    BMI 22.25 kg/m2           Physical Exam:  General appearance: alert, cooperative, no distress  Lungs: clear to auscultation bilaterally  Heart: regular rate and rhythm, S1, S2 normal, no murmur, click, rub or gallop  Abdomen: soft, non tender, non distended. Normoactive bowel sounds. Extremities: Dressing intact right foot; 2/5 toes amputated R    Skin: left medial heel wound present  Neurologic: Grossly normal  PSY: Mood and affect normal, appropriately behaved      Intake and Output:  Current Shift:     Last three shifts:  08/18 1901 - 08/20 0700  In: 1400 [I.V.:1400]  Out: 1000 [Urine:1000]    Recent Results (from the past 24 hour(s))   GLUCOSE, POC    Collection Time: 08/19/18  9:07 AM   Result Value Ref Range    Glucose (POC) 181 (H) 70 - 110 mg/dL   GLUCOSE, POC    Collection Time: 08/19/18 12:49 PM   Result Value Ref Range    Glucose (POC) 164 (H) 70 - 110 mg/dL   VANCOMYCIN, TROUGH    Collection Time: 08/19/18  1:51 PM   Result Value Ref Range    Vancomycin,trough 10.4 10.0 - 20.0 ug/mL    Reported dose date: 20180819      Reported dose time: 600      Reported dose: 1250 MG UNITS   GLUCOSE, POC    Collection Time: 08/19/18  4:53 PM   Result Value Ref Range    Glucose (POC) 126 (H) 70 - 110 mg/dL   GLUCOSE, POC    Collection Time: 08/19/18  9:22 PM   Result Value Ref Range    Glucose (POC) 121 (H) 70 - 110 mg/dL   CBC W/O DIFF    Collection Time: 08/20/18  4:10 AM   Result Value Ref Range    WBC 10.4 4.6 - 13.2 K/uL    RBC 3.51 (L) 4.70 - 5.50 M/uL    HGB 9.7 (L) 13.0 - 16.0 g/dL    HCT 28.7 (L) 36.0 - 48.0 %    MCV 81.8 74.0 - 97.0 FL    MCH 27.6 24.0 - 34.0 PG    MCHC 33.8 31.0 - 37.0 g/dL    RDW 12.8 11.6 - 14.5 %    PLATELET 422 (H) 605 - 420 K/uL    MPV 9.3 9.2 - 11.8 FL           Lab Results   Component Value Date/Time    Glucose 230 (H) 08/19/2018 04:50 AM    Glucose 353 (H) 08/18/2018 04:47 AM    Glucose 294 (H) 08/17/2018 03:50 AM    Glucose 267 (H) 08/16/2018 07:45 PM        Assessment/Plan:     Patient Active Problem List   Diagnosis Code    Type 1 diabetes mellitus without complication ([facility]) H41.1    Osteomyelitis ([facility] M86.9    Necrotizing fasciitis due to microorganism ([facility]) M72.6    Gangrene of toe ([facility]) I96       A/P:    S/p I&D R foot; 5th toe amputation   -pain meds prn   -podiatry & ID following-appreciate  -vanc/zosyn  (per ID duration of abx if all infected bone has been removed will be until no further surgical debridement is needed)   -awaiting cultures-defer further antibx to ID   -dressing changes- defer to podiatry (NS, sterile 4X4s, kerlix)   -excisional debridement of soft tissue and bone right foot 8/21/18 (NPO after MN)     DM II  -accuchecks  -SSI  -lantus 16 U   -diabetic diet     Wound care consulted for left medial heel wound.        JAMES WolfRivers[facility]  KRIYK:[phone]  Office:  [phone]

## 2018-08-20 NOTE — DIABETES MGMT
NUTRITIONAL ASSESSMENT GLYCEMIC CONTROL/ PLAN OF CARE     Jose Gong           26 y.o.           8/16/2018                 1. Gangrene of toe of right foot ([address]    2. Cellulitis of right foot    3. IDDM (insulin dependent diabetes mellitus) ([facility]       INTERVENTIONS/PLAN:   1. Adjust diet to 2400 calories consistent CHO to ensure adequate energy for wound healing. 2.  Will obtain September 2018 [facility] calendar to leave with pt. 3.  Monitor po intake, labs and weights. ASSESSMENT:   Nutritional Status:  Pt is 91% ideal wt; BMI (calculated): 22.2 kg/m2 (normal weight classification). Pt appear slender but well nourished; taking 100% meals. Pt is at nutrition risk due to current osteomyelitis. Nutrition Diagnoses: Altered nutrition related labs due to diabetes as evidenced by A1C of 11.0%. Increased nutrient needs due to wound healing needs as evidenced by Osteomyelitis right 5th toe with wet gangrene. Glyemic control RN started DM education today. Diabetes Management:   Good glycemic control. Pt obtains his insulins from the [facility] and is asking for the September calendar. It does not appear to yet be available. Recent blood glucose:   8/20/18:  133, 220, 88  8/19/18:  181, 164, 126, 121 - pt received 38 units insulin (32 units Lantus and 6 units corrective lispro)   Within target range (non-ICU: <140; ICU<180): [] Yes   []  No  varied    Current Insulin regimen:   Lantus 16 units every 12 hours  Corrective lispro normal insulin sensitivity ACHS  Home medication/insulin regimen:   Lantus 16 units every evening  Humalog 3 times daily on a corrective scale - pt reports usually 6-10 units w/ meals)  HbA1c: 11.0 % - ave BG has been ~ 269 mg/dL over past 3 months. Adequate glycemic control PTA:  [] Yes  [x] No       SUBJECTIVE/OBJECTIVE:   Information obtained from: chart review, pt  Pt reports his appetite is very good. He state his weight was stable PTA.   Pt admitted with Osteomyelitis right 5th toe with wet gangrene and PMHx including 2nd right toe amputation, T1DM. Diet: consistent CHO    Patient Vitals for the past 100 hrs:   % Diet Eaten   08/20/18 0830 100 %   08/18/18 1014 100 %   08/17/18 1739 100 %   08/17/18 1347 100 %   08/17/18 1003 0 %         Medications: [x]                Reviewed     Most Recent POC Glucose:   Recent Labs      08/19/18   0450  08/18/18   0447   GLU  230*  353*         Labs:   Lab Results   Component Value Date/Time    Hemoglobin A1c 11.0 (H) 08/16/2018 07:45 PM     Lab Results   Component Value Date/Time    Sodium 136 08/19/2018 04:50 AM    Potassium 3.9 08/19/2018 04:50 AM    Chloride 97 (L) 08/19/2018 04:50 AM    CO2 31 08/19/2018 04:50 AM    Anion gap 8 08/19/2018 04:50 AM    Glucose 230 (H) 08/19/2018 04:50 AM    BUN 5 (L) 08/19/2018 04:50 AM    Creatinine 0.81 08/19/2018 04:50 AM    Calcium 8.1 (L) 08/19/2018 04:50 AM    Albumin 2.7 (L) 08/16/2018 07:45 PM       Anthropometrics: IBW : 89.1 kg (196 lb 6.9 oz), % IBW (Calculated): 90.62 %, BMI (calculated): 22.2  Wt Readings from Last 1 Encounters:   08/20/18 80.7 kg (178 lb)      Wt Readings from Last 3 Encounters:   08/20/18 80.7 kg (178 lb)   05/31/18 76.2 kg (168 lb)     Ht Readings from Last 1 Encounters:   08/20/18 6' 3\" (1.905 m)       Estimated Nutrition Needs:  2563 Kcals/day, Protein (g): 121 g Fluid (ml): 2600 ml  Based on:   [x]          Actual BW    []          ABW   []            Adjusted BW           Nutrition Interventions:  2400 calories consistent CHO diet  Goal:   Blood glucose will be within target range of  mg/dL by 8/23/18. Pt will continue to consume >75% meals by 8/25/18. Weight maintenance (+/- 1-2 kg) by 8/30/18.         Nutrition Monitoring and Evaluation      [x]     Monitor po intake on meal rounds  [x]     Continue inpatient monitoring and intervention  []     Other:      Nutrition Risk:  []   High     [x]  Moderate    []  Minimal/Uncompromised    Julia Matthew Del Angel, [address] [place] [address], CDE   Office:  [address] [place] [address] Pager:  [phone]

## 2018-08-20 NOTE — ROUTINE PROCESS
Bedside and Verbal shift change report given to [address] (oncoming nurse) by Deisi Noel (offgoing nurse). Report included the following information SBAR, Kardex and MAR.

## 2018-08-20 NOTE — ROUTINE PROCESS
Bedside and Verbal shift change report given to JEN putnam (oshncoming nurse) by [facility] RN (offgoing nurse). Report included the folowing information SBAR, Kardex, Intake/Output, MAR and Recent Results.

## 2018-08-20 NOTE — PROGRESS NOTES
Chart reviewed. Pt is scheduled for surgery tomorrow. Transition plan is to return home. Will continue to follow.

## 2018-08-20 NOTE — DIABETES MGMT
GLYCEMIC CONTROL AND NUTRITION    Assessment/Recommendations:  Blood glucose this am 133 mg/dl   Continue basal and corrective insulin coverage as ordered  Will continue inpatient monitoring. Most recent blood glucose values:    Current A1C of 11.0% is equivalent to average blood glucose of 269 mg/dl over the past 2-3 months.     Current hospital diabetes medications:   Lantus 16 units every 12  hours  Lispro very insulin resistant corrective insulin coverage AC&HS  Previous day's insulin requirements:   Lantus 32 units  Lispro 6 units corrective insulin  Home diabetes medications:  Lantus 16 units every evening  Humalog 3 times daily on a corrective scale (pt states its usually around 6-10 units with meals)  Diet:    Diabetic consistent carb  Education:  __x_Refer to Diabetes Education Record             ____Education not indicated at this time      Ana Mcmahon RN  Pager [phone]  Ext [phone]

## 2018-08-20 NOTE — PROGRESS NOTES
Problem: Falls - Risk of  Goal: *Absence of Falls  Document Baljinder Fall Risk and appropriate interventions in the flowsheet. Outcome: Progressing Towards Goal  Fall Risk Interventions:  Mobility Interventions: Communicate number of staff needed for ambulation/transfer, Patient to call before getting OOB         Medication Interventions: Evaluate medications/consider consulting pharmacy, Patient to call before getting OOB    Elimination Interventions: Call light in reach, Urinal in reach             Problem: Pressure Injury - Risk of  Goal: *Prevention of pressure injury  Document Jonathan Scale and appropriate interventions in the flowsheet.    Outcome: Progressing Towards Goal  Pressure Injury Interventions:  Sensory Interventions: Assess changes in LOC, Check visual cues for pain, Keep linens dry and wrinkle-free, Maintain/enhance activity level, Minimize linen layers, Monitor skin under medical devices, Pad between skin to skin, Pressure redistribution bed/mattress (bed type)    Moisture Interventions: Apply protective barrier, creams and emollients, Maintain skin hydration (lotion/cream)    Activity Interventions: PT/OT evaluation, Increase time out of bed    Mobility Interventions: HOB 30 degrees or less, PT/OT evaluation    Nutrition Interventions: Document food/fluid/supplement intake

## 2018-08-20 NOTE — ROUTINE PROCESS
0730 Bedside, Verbal and Written shift change report given to [facility] [facility]  (oncoming nurse) by Annemarie Carreno RN (offgoing nurse). Report included the following information SBAR and Kardex.  Patient currently resting in bed, denies pain or shortness of breath, call bell in reach, 5 Ps and hourly rounding completed, bed locked in low position     0915 off unit for xray    1000 returned to unit, pain meds given

## 2018-08-20 NOTE — WOUND CARE
Wound/Ostomy Nurse Progress Note          Patient: Kenya Stone                                                                                      TYD:[id number]/2/5854    MRN: [id number]              Situation: Wound care consult    Background: Patient with DM was admitted to [facility] on 8/16/18 for an infected right foot ulcer and gangrene of the right 5th toe. Assessment: The patient is sitting up in bed awake and listening to his phone with earbuds in. He states he requested a wound care consult for his left and right great toe. The consult was placed in his chart for a wound on the left heel, but there is not evidence of a wound, bruise, or irritation on the left heel. The skin is 100% intact. His left great toe has a large callous which is thickened and rough, but does not appear to penetrate through the epithelium. The right toe has a similar callous, but smaller than the left. SInce there is no open wound apart from his right foot surgical incision, wound care is not appropriate. Explained to patient the need to custom diabetic shoes or inserts and off-loading of the area. He should also see a podiatrist every three months to have the callouses pared and general diabetic foot care. The patient works full time but has no insurance. Recommendation: Patient was given moleskin to use to off-load the plantar toe callouses. The method was explained to him, but as he does not have his shoes with him, he will have to fit the moleskin in them himself. He agreed to this and demonstrated understanding. Follow up with podiatrist as soon as possible.

## 2018-08-20 NOTE — PROGRESS NOTES
Podiatry Surgery Progress Note      Patient: Patrick Martinez MRN: [id number]  SSN: [id number]    YOB: 1985  Age: 26 y.o. Sex: male      Assessment:     Patient Active Problem List   Diagnosis Code    Type 1 diabetes mellitus without complication ([facility] U92.2    [facility] M86.9    Necrotizing fasciitis due to microorganism [facility] M72.6    Gangrene of toe ([facility] I96          Plan: Will schedule for excisional debridement of soft tissue and bone right foot 08/21/18 at 7:30 am.  NPO p MN. Changed dressings with NS, sterile 4x4s and Kerlix. Continue antibx per ID. Will xray today. Total time spent with patient: 30 [address] discussed with: Patient and Consultant/Specialist    Discussed:  Care Plan    Disposition:  Stable      Mr. Janene Brown is a 26 y.o. male who was seen at bedside with no new complaints. He states he does have some tenderness in the right foot but controlled. Subjective:   Past Medical History  Past Medical History:   Diagnosis Date    Diabetes ([facility] 1998     Social History     Social History    Marital status: SINGLE     Spouse name: N/A    Number of children: N/A    Years of education: N/A     Occupational History    Not on file.      Social History Main Topics    Smoking status: Current Every Day Smoker     Packs/day: 0.50    Smokeless tobacco: Never Used    Alcohol use No    Drug use: No    Sexual activity: Not on file     Other Topics Concern    Not on file     Social History Narrative       Current Medications  Current Facility-Administered Medications   Medication Dose Route Frequency Provider Last Rate Last Dose    insulin glargine (LANTUS) injection 16 Units  16 Units SubCUTAneous Q12H Nahomi Cook NP   16 Units at 08/19/18 2311    insulin lispro (HUMALOG) injection   SubCUTAneous AC&HS Nahomi Cook NP   Stopped at 08/19/18 1630    vancomycin (VANCOCIN) 1500 mg in  ml infusion  1,500 mg IntraVENous Q8H Rosina Yanes .3 mL/hr at 08/19/18 2311 1,500 mg at 08/19/18 2311    [START ON 8/21/2018] VANCOMYCIN INFORMATION NOTE   Other ONCE Rosina Yanes MD        piperacillin-tazobactam (ZOSYN) 3.375 g in  ml ##EXTENDED 4HRS INFUSION  3.375 g IntraVENous Catracho Morse MD 25 mL/hr at 08/20/18 0049 3.375 g at 08/20/18 0049    oxyCODONE IR (ROXICODONE) tablet 5-10 mg  5-10 mg Oral Q4H PRN Bhavana Johnsoner, DO   10 mg at 08/18/18 1806    HYDROmorphone (DILAUDID) syringe 1 mg  1 mg IntraVENous Q4H PRN Bhavana Johnsoner, DO   1 mg at 08/20/18 7016    sodium chloride (NS) flush 5-10 mL  5-10 mL IntraVENous PRN Allie Adkins MD   10 mL at 08/17/18 1658    glucose chewable tablet 16 g  4 Tab Oral PRN Wyn Mortimer, MD        glucagon (GLUCAGEN) injection 1 mg  1 mg IntraMUSCular PRN Wyn Mortimer, MD        dextrose (D50W) injection syrg 12.5-25 g  25-50 mL IntraVENous PRN Wyn Mortimer, MD        VANCOMYCIN INFORMATION NOTE   Other Rx Dosing/Monitoring Wyn Mortimer, MD           Patient Allergies  No Known Allergies       Objective:   General Exam  alert, cooperative, no distress, appears stated age    Vitals  Visit Vitals    /81    Pulse 68    Temp 98.2 °F (36.8 °C)    Resp 16    Ht 6' 3\" (1.905 m)    Wt 80.7 kg (178 lb)    SpO2 98%    BMI 22.25 kg/m2       REVIEW OF SYSTEMS:  General: denies chronic fatigue, weight loss, fever, anemia, bruising, depression, nervousness, panic attacks  HEENT: denies ringing in ears, ear infections, dizzy spells, poor vision, glaucoma, sinus trouble, hoarseness, eye infections  GI: denies diarrhea, gas, bloating, heartburn, regurgitation, difficulty swallowing, painful swallowing, nausea, vomiting, constipation, abdominal pain, decreased appetite, blood in stools, black stools, jaundice, dark urine  Lungs: denies pneumonia, asthma, cough, SOB, hemoptysis  Heart: denies chest pain, irregular heart beat, ankle swelling, HTN  Skin: denies rashes, hives, allergic reaction  Urinary: denies UTI, kidney stones, decreased urine force and flow, urination at night, blood in urine, painful urination  Bones and Joints: denies arthritis, rheumatism, back pain, gout, osteoporosis  Neurologic: denies stroke, seizures, headaches, numbness, tingling    Foot Exam    Dressing intact with no strike through bleeding noted right foot    Vascular: Dorsalis Pedis 2/4 and Posterior Tibial 2/4 Bilaterally. Pedal hair is                                         Decreased. There  are not varicosities present. Pedal edema is moderate right foot and decreased greatly since surgery     Neurological:  Light touch protective sensation is decreased to both feet. There are non reproducible paresthesias to bilateral lower extremities. There are no Tinel's or 's signs present to the nerves crossing the ankle joint.     Orthopedic:  Gross abnormalities present with multiple digital amputations. Manual muscle testing is 5/5 right and 5/5 left for all remaining groups traversing the ankles.      Dermatological:   OPEN amputation site with exposed dermis, subcutaneous tissue, tendon, and bone (5th metatarsal head). There is mixed granulation tissue and slough noted dorsal lateral right foot. No purulence or malodor. Great decrease in erythema and warm. Wound Foot Right (Active)   DRESSING STATUS Clean, dry, and intact 8/18/2018  8:00 PM   DRESSING TYPE Elastic bandage 8/18/2018  8:00 PM   Incision site well approximated? No 8/17/2018  9:00 AM   Tissue Type Black 8/16/2018  4:00 AM   Drainage Amount  None 8/17/2018  8:00 PM   Drainage Color Serous 8/16/2018  4:00 AM   Wound Odor None 8/18/2018  8:00 PM   Number of days:3       [REMOVED] Wound Foot Right (Removed)   Removed 08/17/18 1401   DRESSING STATUS Moist;New drainage; Old drainage 8/17/2018  8:10 AM   DRESSING TYPE Open to air 8/17/2018  8:10 AM   Non-Pressure Injury Partial thickness (epider/derm) 8/17/2018  8:10 AM   Tissue Type Black 8/17/2018  8:10 AM   Drainage Amount  Small  8/17/2018  8:10 AM   Drainage Color Black 8/17/2018  8:10 AM   Wound Odor Foul 8/17/2018  8:10 AM   Periwound Skin Condition Edematous 8/17/2018  8:10 AM   Number of days:0        Labs  Recent Results (from the past 24 hour(s))   GLUCOSE, POC    Collection Time: 08/19/18  9:07 AM   Result Value Ref Range    Glucose (POC) 181 (H) 70 - 110 mg/dL   GLUCOSE, POC    Collection Time: 08/19/18 12:49 PM   Result Value Ref Range    Glucose (POC) 164 (H) 70 - 110 mg/dL   VANCOMYCIN, TROUGH    Collection Time: 08/19/18  1:51 PM   Result Value Ref Range    Vancomycin,trough 10.4 10.0 - 20.0 ug/mL    Reported dose date: 20180819      Reported dose time: 600      Reported dose: 1250 MG UNITS   GLUCOSE, POC    Collection Time: 08/19/18  4:53 PM   Result Value Ref Range    Glucose (POC) 126 (H) 70 - 110 mg/dL   GLUCOSE, POC    Collection Time: 08/19/18  9:22 PM   Result Value Ref Range    Glucose (POC) 121 (H) 70 - 110 mg/dL   CBC W/O DIFF    Collection Time: 08/20/18  4:10 AM   Result Value Ref Range    WBC 10.4 4.6 - 13.2 K/uL    RBC 3.51 (L) 4.70 - 5.50 M/uL    HGB 9.7 (L) 13.0 - 16.0 g/dL    HCT 28.7 (L) 36.0 - 48.0 %    MCV 81.8 74.0 - 97.0 FL    MCH 27.6 24.0 - 34.0 PG    MCHC 33.8 31.0 - 37.0 g/dL    RDW 12.8 11.6 - 14.5 %    PLATELET 453 (H) 480 - 420 K/uL    MPV 9.3 9.2 - 11.8 FL   GLUCOSE, POC    Collection Time: 08/20/18  7:52 AM   Result Value Ref Range    Glucose (POC) 133 (H) 70 - 110 mg/dL       X-Ray:  Reviewed    Procedures:  S/p right 5th toe amputation and incision and drainage right foot necrotizing fasciitis POD #3               N Delmy Castillo DPM  August 20, 2018

## 2018-08-20 NOTE — DIABETES MGMT
Diabetes Patient/Family Education Record    Factors That  May Influence Patients Ability  to Learn or  Comply with Recommendations   []   Language barrier    []   Cultural needs   []   Motivation    []   Cognitive limitation    []   Physical   []   Education    []   Physiological factors   []   Hearing/vision/speaking impairment   []   Sabianism beliefs    [x]   Financial factors   []  Other:   []  No factors identified at this time. Person Instructed:   [x]   Patient   []   Family   []  Other     Preference for Learning:   [x]   Verbal   [x]   Written   []  Demonstration     Level of Comprehension & Competence:    [x]  Good                                      [] Fair                                     []  Poor                             []  Needs Reinforcement   [x]  Teachback completed    Education Component:   [x]  Medication management, including how to administer insulin (if appropriate) and potential medication interactions states he has had type 1 diabetes for 20 years. Is a patient at the [facility], where he was given Lantus insulin pens. He states he is currently taking Lantus 16 units every evening and humalog on a corrective scale with meals. When he runs out of Lantus, he switches insulins to Novolin N and Novolin R, until he gets to the [address] again. []  Nutritional management    []  Exercise   [x]  Signs, symptoms, and treatment of hyperglycemia and hypoglycemia   [x] Prevention, recognition and treatment of hyperglycemia and hypoglycemia   [x]  Importance of blood glucose monitoring and how to obtain a blood glucose meter states he has a meter and supplies at home that he obtained from his last admission to [facility]. He checks his BG 3 times daily. []  Instruction on use of the blood glucose meter   [x]  Discuss the importance of HbA1C monitoring 11% equivalent to an average blood glucose of 269 mg/dl.   Patient states this has improved from 13%.   [x]  Sick day guidelines   []  Proper use and disposal of lancets, needles, syringes or insulin pens (if appropriate)   [x]  Potential long-term complications (retinopathy, kidney disease, neuropathy, foot care) discussed with pt. States he has a job where he is on his feet all day long, but now he is in a better situation because he has a place to live where he can take better care of himself. He was previously staying in a shelter.    [x] Information about whom to contact in case of emergency or for more information    [x]  Goal:  Patient/family will demonstrate understanding of Diabetes Self Management Skills by: (date) _______  Plan for post-discharge education or self-management support:    [x] Outpatient class schedule provided            [] Patient Declined    [] Scheduled for outpatient classes (date) _______     Bahman Hawk RN CDE  Ext [phone]  [facility] [phone]

## 2018-08-20 NOTE — PROGRESS NOTES
INFECTIOUS DISEASE FOLLOW UP NOTE :-     Admit Date: 8/16/2018    ABX;      Current  Prior     zosyn 8/16 - 4 Vancomycin 8/16- 4      ASSESSMENT: -> RECS     Necrotizing Fasciitis R foot, R 5th toe OM  - s/p I&D, right 5th toe amputation  -  gs,cx - Citrobacter, proteus GAS, strep viridans  -> continue Zosyn, d/c vanco as no MRSA but GAS cause for nec fasc, also has citrobacter/proteus   -> plan for excision debridement of soft tissue of rt foot and bone, looks like from repeat xray of foot all infected bone out from 5th toe, hence likely will be able to do po antibiotics at discharge      SIRS/sepsis  - Leukocytosis, tachycardia present on admission  - due to above  - improving -> leukocytosis resolved. DM I  - on insulin x 20 years     H/o prior toe amputations  - L 3rd toe 2017, R 3rd toe 4/18/2018      MICROBIOLOGY:   8/16     Blcx x 2 - NTD  8/17     Wound cx - Citrobacter koseri [ pan S ], Proteus mirabilis [ Pan S ], GAS, strep viridans     LINES AND CATHETERS:   PIV    SUBJECTIVE :     Interval notes reviewed. Pt is feeling overall better, says pain in foot controlled with meds. No fever/chills.      MEDICATIONS :     Current Facility-Administered Medications   Medication Dose Route Frequency    insulin glargine (LANTUS) injection 16 Units  16 Units SubCUTAneous Q12H    insulin lispro (HUMALOG) injection   SubCUTAneous AC&HS    vancomycin (VANCOCIN) 1500 mg in  ml infusion  1,500 mg IntraVENous Q8H    [START ON 8/21/2018] VANCOMYCIN INFORMATION NOTE   Other ONCE    piperacillin-tazobactam (ZOSYN) 3.375 g in  ml ##EXTENDED 4HRS INFUSION  3.375 g IntraVENous Q8H    oxyCODONE IR (ROXICODONE) tablet 5-10 mg  5-10 mg Oral Q4H PRN    HYDROmorphone (DILAUDID) syringe 1 mg  1 mg IntraVENous Q4H PRN    sodium chloride (NS) flush 5-10 mL  5-10 mL IntraVENous PRN    glucose chewable tablet 16 g 4 Tab Oral PRN    glucagon (GLUCAGEN) injection 1 mg  1 mg IntraMUSCular PRN    dextrose (D50W) injection syrg 12.5-25 g  25-50 mL IntraVENous PRN    VANCOMYCIN INFORMATION NOTE   Other Rx Dosing/Monitoring       OBJECTIVE :     Visit Vitals    /89 (BP 1 Location: Left arm, BP Patient Position: At rest)    Pulse 82    Temp 98.2 °F (36.8 °C)    Resp 16    Ht 6' 3\" (1.905 m)    Wt 80.7 kg (178 lb)    SpO2 100%    BMI 22.25 kg/m2       Temp (24hrs), Av.3 °F (36.8 °C), Min:98.1 °F (36.7 °C), Max:98.8 °F (37.1 °C)    GEN - 33yr old AAM not in distress, lying in bed  RESP- ctab  CVS- s1s2 normal, no murmur   ABD-soft, NT, bs present   EXT-rt foot wrapped in post op dressing   SKIN -no rash   NEURO - awake, alert, O x3    Labs: Results:   Chemistry Recent Labs      18   0450  18   0447   GLU  230*  353*   NA  136  133*   K  3.9  4.0   CL  97*  97*   CO2  31  27   BUN  5*  7   CREA  0.81  0.90   CA  8.1*  7.8*   AGAP  8  9   BUCR  6*  8*      CBC w/Diff Recent Labs      18   0410  18   0447   WBC  10.4  20.1*   RBC  3.51*  3.52*   HGB  9.7*  9.8*   HCT  28.7*  29.0*   PLT  502*  434*          RADIOLOGY :    Xray foot  - IMPRESSION:  1. Recent postoperative changes involving the right foot fifth ray as above. Xray rt foot  - IMPRESSION: Soft tissue swelling and subcutaneous gas along the fifth digit. Widening of the proximal interphalangeal joint space and adjacent mild cortical  irregularity. Findings suspicious for early osteomyelitis.     Michael Solorio MD  2018  [facility] AT THE [facility]  [phone]

## 2018-08-21 ENCOUNTER — ANESTHESIA (OUTPATIENT)
Dept: SURGERY | Age: 33
DRG: 853 | End: 2018-08-21
Payer: SELF-PAY

## 2018-08-21 LAB
ANION GAP SERPL CALC-SCNC: 6 MMOL/L (ref 3–18)
BACTERIA SPEC CULT: ABNORMAL
BUN SERPL-MCNC: 6 MG/DL (ref 7–18)
BUN/CREAT SERPL: 7 (ref 12–20)
CALCIUM SERPL-MCNC: 8.6 MG/DL (ref 8.5–10.1)
CHLORIDE SERPL-SCNC: 100 MMOL/L (ref 100–108)
CO2 SERPL-SCNC: 32 MMOL/L (ref 21–32)
CREAT SERPL-MCNC: 0.87 MG/DL (ref 0.6–1.3)
DATE LAST DOSE: ABNORMAL
GLUCOSE BLD STRIP.AUTO-MCNC: 130 MG/DL (ref 70–110)
GLUCOSE BLD STRIP.AUTO-MCNC: 193 MG/DL (ref 70–110)
GLUCOSE BLD STRIP.AUTO-MCNC: 242 MG/DL (ref 70–110)
GLUCOSE BLD STRIP.AUTO-MCNC: 246 MG/DL (ref 70–110)
GLUCOSE SERPL-MCNC: 156 MG/DL (ref 74–99)
POTASSIUM SERPL-SCNC: 4.6 MMOL/L (ref 3.5–5.5)
REPORTED DOSE,DOSE: ABNORMAL UNITS
REPORTED DOSE/TIME,TMG: 2300
SERVICE CMNT-IMP: ABNORMAL
SODIUM SERPL-SCNC: 138 MMOL/L (ref 136–145)
VANCOMYCIN TROUGH SERPL-MCNC: 6.2 UG/ML (ref 10–20)

## 2018-08-21 PROCEDURE — 65270000029 HC RM PRIVATE

## 2018-08-21 PROCEDURE — 74011000250 HC RX REV CODE- 250: Performed by: PODIATRIST

## 2018-08-21 PROCEDURE — 77030013708 HC HNDPC SUC IRR PULS STRY –B: Performed by: PODIATRIST

## 2018-08-21 PROCEDURE — 77030031139 HC SUT VCRL2 J&J -A: Performed by: PODIATRIST

## 2018-08-21 PROCEDURE — 77030032490 HC SLV COMPR SCD KNE COVD -B: Performed by: PODIATRIST

## 2018-08-21 PROCEDURE — 36415 COLL VENOUS BLD VENIPUNCTURE: CPT | Performed by: HOSPITALIST

## 2018-08-21 PROCEDURE — 0QBN0ZZ EXCISION OF RIGHT METATARSAL, OPEN APPROACH: ICD-10-PCS | Performed by: PODIATRIST

## 2018-08-21 PROCEDURE — 97162 PT EVAL MOD COMPLEX 30 MIN: CPT

## 2018-08-21 PROCEDURE — 77030011265 HC ELECTRD BLD HEX COVD -A: Performed by: PODIATRIST

## 2018-08-21 PROCEDURE — 74011000258 HC RX REV CODE- 258

## 2018-08-21 PROCEDURE — 97116 GAIT TRAINING THERAPY: CPT

## 2018-08-21 PROCEDURE — 74011636637 HC RX REV CODE- 636/637: Performed by: NURSE PRACTITIONER

## 2018-08-21 PROCEDURE — 77030011247 HC DRN WND KT TLS STRY -B: Performed by: PODIATRIST

## 2018-08-21 PROCEDURE — 74011000258 HC RX REV CODE- 258: Performed by: INTERNAL MEDICINE

## 2018-08-21 PROCEDURE — 76210000016 HC OR PH I REC 1 TO 1.5 HR: Performed by: PODIATRIST

## 2018-08-21 PROCEDURE — 74011000272 HC RX REV CODE- 272: Performed by: PODIATRIST

## 2018-08-21 PROCEDURE — 80048 BASIC METABOLIC PNL TOTAL CA: CPT | Performed by: HOSPITALIST

## 2018-08-21 PROCEDURE — 74011250636 HC RX REV CODE- 250/636: Performed by: HOSPITALIST

## 2018-08-21 PROCEDURE — 77030002916 HC SUT ETHLN J&J -A: Performed by: PODIATRIST

## 2018-08-21 PROCEDURE — 80202 ASSAY OF VANCOMYCIN: CPT | Performed by: INTERNAL MEDICINE

## 2018-08-21 PROCEDURE — 74011250636 HC RX REV CODE- 250/636: Performed by: INTERNAL MEDICINE

## 2018-08-21 PROCEDURE — 77030020753 HC CUF TRNQT 1BLA STRY -B: Performed by: PODIATRIST

## 2018-08-21 PROCEDURE — 77030000032 HC CUF TRNQT ZIMM -B: Performed by: PODIATRIST

## 2018-08-21 PROCEDURE — 74011250636 HC RX REV CODE- 250/636

## 2018-08-21 PROCEDURE — 77030006773 HC BLD SAW OSC BRSM -A: Performed by: PODIATRIST

## 2018-08-21 PROCEDURE — 74011250636 HC RX REV CODE- 250/636: Performed by: PODIATRIST

## 2018-08-21 PROCEDURE — 76060000034 HC ANESTHESIA 1.5 TO 2 HR: Performed by: PODIATRIST

## 2018-08-21 PROCEDURE — 82962 GLUCOSE BLOOD TEST: CPT

## 2018-08-21 PROCEDURE — 74011250636 HC RX REV CODE- 250/636: Performed by: NURSE ANESTHETIST, CERTIFIED REGISTERED

## 2018-08-21 PROCEDURE — 77030018836 HC SOL IRR NACL ICUM -A: Performed by: PODIATRIST

## 2018-08-21 PROCEDURE — 76010000153 HC OR TIME 1.5 TO 2 HR: Performed by: PODIATRIST

## 2018-08-21 PROCEDURE — 74011636637 HC RX REV CODE- 636/637: Performed by: HOSPITALIST

## 2018-08-21 RX ORDER — MIDAZOLAM HYDROCHLORIDE 1 MG/ML
INJECTION, SOLUTION INTRAMUSCULAR; INTRAVENOUS AS NEEDED
Status: DISCONTINUED | OUTPATIENT
Start: 2018-08-21 | End: 2018-08-21 | Stop reason: HOSPADM

## 2018-08-21 RX ORDER — DIPHENHYDRAMINE HYDROCHLORIDE 50 MG/ML
12.5 INJECTION, SOLUTION INTRAMUSCULAR; INTRAVENOUS
Status: DISCONTINUED | OUTPATIENT
Start: 2018-08-21 | End: 2018-08-21 | Stop reason: HOSPADM

## 2018-08-21 RX ORDER — PROPOFOL 10 MG/ML
INJECTION, EMULSION INTRAVENOUS
Status: DISCONTINUED | OUTPATIENT
Start: 2018-08-21 | End: 2018-08-21 | Stop reason: HOSPADM

## 2018-08-21 RX ORDER — HYDROMORPHONE HYDROCHLORIDE 2 MG/ML
0.5 INJECTION, SOLUTION INTRAMUSCULAR; INTRAVENOUS; SUBCUTANEOUS
Status: DISCONTINUED | OUTPATIENT
Start: 2018-08-21 | End: 2018-08-21 | Stop reason: HOSPADM

## 2018-08-21 RX ORDER — OXYCODONE AND ACETAMINOPHEN 5; 325 MG/1; MG/1
1 TABLET ORAL AS NEEDED
Status: DISCONTINUED | OUTPATIENT
Start: 2018-08-21 | End: 2018-08-21 | Stop reason: HOSPADM

## 2018-08-21 RX ORDER — HYDROMORPHONE HYDROCHLORIDE 1 MG/ML
INJECTION, SOLUTION INTRAMUSCULAR; INTRAVENOUS; SUBCUTANEOUS
Status: COMPLETED
Start: 2018-08-21 | End: 2018-08-21

## 2018-08-21 RX ORDER — DEXTROSE MONOHYDRATE 25 G/50ML
25-50 INJECTION, SOLUTION INTRAVENOUS AS NEEDED
Status: DISCONTINUED | OUTPATIENT
Start: 2018-08-21 | End: 2018-08-21 | Stop reason: HOSPADM

## 2018-08-21 RX ORDER — LIDOCAINE HYDROCHLORIDE 20 MG/ML
INJECTION, SOLUTION EPIDURAL; INFILTRATION; INTRACAUDAL; PERINEURAL AS NEEDED
Status: DISCONTINUED | OUTPATIENT
Start: 2018-08-21 | End: 2018-08-21 | Stop reason: HOSPADM

## 2018-08-21 RX ORDER — SODIUM CHLORIDE, SODIUM LACTATE, POTASSIUM CHLORIDE, CALCIUM CHLORIDE 600; 310; 30; 20 MG/100ML; MG/100ML; MG/100ML; MG/100ML
100 INJECTION, SOLUTION INTRAVENOUS CONTINUOUS
Status: DISCONTINUED | OUTPATIENT
Start: 2018-08-21 | End: 2018-08-21 | Stop reason: HOSPADM

## 2018-08-21 RX ORDER — SODIUM CHLORIDE 0.9 % (FLUSH) 0.9 %
5-10 SYRINGE (ML) INJECTION AS NEEDED
Status: DISCONTINUED | OUTPATIENT
Start: 2018-08-21 | End: 2018-08-21 | Stop reason: HOSPADM

## 2018-08-21 RX ORDER — VANCOMYCIN HYDROCHLORIDE 1 G/20ML
INJECTION, POWDER, LYOPHILIZED, FOR SOLUTION INTRAVENOUS AS NEEDED
Status: DISCONTINUED | OUTPATIENT
Start: 2018-08-21 | End: 2018-08-21 | Stop reason: HOSPADM

## 2018-08-21 RX ORDER — MAGNESIUM SULFATE 100 %
4 CRYSTALS MISCELLANEOUS AS NEEDED
Status: DISCONTINUED | OUTPATIENT
Start: 2018-08-21 | End: 2018-08-21 | Stop reason: HOSPADM

## 2018-08-21 RX ORDER — FENTANYL CITRATE 50 UG/ML
INJECTION, SOLUTION INTRAMUSCULAR; INTRAVENOUS AS NEEDED
Status: DISCONTINUED | OUTPATIENT
Start: 2018-08-21 | End: 2018-08-21 | Stop reason: HOSPADM

## 2018-08-21 RX ORDER — PROPOFOL 10 MG/ML
INJECTION, EMULSION INTRAVENOUS AS NEEDED
Status: DISCONTINUED | OUTPATIENT
Start: 2018-08-21 | End: 2018-08-21 | Stop reason: HOSPADM

## 2018-08-21 RX ORDER — ONDANSETRON 2 MG/ML
INJECTION INTRAMUSCULAR; INTRAVENOUS AS NEEDED
Status: DISCONTINUED | OUTPATIENT
Start: 2018-08-21 | End: 2018-08-21 | Stop reason: HOSPADM

## 2018-08-21 RX ORDER — INSULIN LISPRO 100 [IU]/ML
INJECTION, SOLUTION INTRAVENOUS; SUBCUTANEOUS ONCE
Status: DISCONTINUED | OUTPATIENT
Start: 2018-08-21 | End: 2018-08-21 | Stop reason: HOSPADM

## 2018-08-21 RX ORDER — KETOROLAC TROMETHAMINE 30 MG/ML
INJECTION, SOLUTION INTRAMUSCULAR; INTRAVENOUS AS NEEDED
Status: DISCONTINUED | OUTPATIENT
Start: 2018-08-21 | End: 2018-08-21 | Stop reason: HOSPADM

## 2018-08-21 RX ORDER — HYDROMORPHONE HYDROCHLORIDE 2 MG/ML
0.2 INJECTION, SOLUTION INTRAMUSCULAR; INTRAVENOUS; SUBCUTANEOUS AS NEEDED
Status: DISCONTINUED | OUTPATIENT
Start: 2018-08-21 | End: 2018-08-21 | Stop reason: HOSPADM

## 2018-08-21 RX ADMIN — INSULIN GLARGINE 10 UNITS: 100 INJECTION, SOLUTION SUBCUTANEOUS at 20:32

## 2018-08-21 RX ADMIN — PIPERACILLIN SODIUM,TAZOBACTAM SODIUM 3.38 G: 3; .375 INJECTION, POWDER, FOR SOLUTION INTRAVENOUS at 07:40

## 2018-08-21 RX ADMIN — FENTANYL CITRATE 50 MCG: 50 INJECTION, SOLUTION INTRAMUSCULAR; INTRAVENOUS at 07:35

## 2018-08-21 RX ADMIN — FENTANYL CITRATE 50 MCG: 50 INJECTION, SOLUTION INTRAMUSCULAR; INTRAVENOUS at 08:08

## 2018-08-21 RX ADMIN — ONDANSETRON 4 MG: 2 INJECTION INTRAMUSCULAR; INTRAVENOUS at 09:05

## 2018-08-21 RX ADMIN — FENTANYL CITRATE 50 MCG: 50 INJECTION, SOLUTION INTRAMUSCULAR; INTRAVENOUS at 07:43

## 2018-08-21 RX ADMIN — HYDROMORPHONE HYDROCHLORIDE 1 MG: 1 INJECTION, SOLUTION INTRAMUSCULAR; INTRAVENOUS; SUBCUTANEOUS at 20:32

## 2018-08-21 RX ADMIN — INSULIN LISPRO 6 UNITS: 100 INJECTION, SOLUTION INTRAVENOUS; SUBCUTANEOUS at 18:23

## 2018-08-21 RX ADMIN — KETOROLAC TROMETHAMINE 30 MG: 30 INJECTION, SOLUTION INTRAMUSCULAR; INTRAVENOUS at 09:05

## 2018-08-21 RX ADMIN — HYDROMORPHONE HYDROCHLORIDE 1 MG: 1 INJECTION, SOLUTION INTRAMUSCULAR; INTRAVENOUS; SUBCUTANEOUS at 10:47

## 2018-08-21 RX ADMIN — SODIUM CHLORIDE, SODIUM LACTATE, POTASSIUM CHLORIDE, AND CALCIUM CHLORIDE 25 ML/HR: 600; 310; 30; 20 INJECTION, SOLUTION INTRAVENOUS at 06:46

## 2018-08-21 RX ADMIN — INSULIN LISPRO 6 UNITS: 100 INJECTION, SOLUTION INTRAVENOUS; SUBCUTANEOUS at 12:25

## 2018-08-21 RX ADMIN — INSULIN LISPRO 3 UNITS: 100 INJECTION, SOLUTION INTRAVENOUS; SUBCUTANEOUS at 22:47

## 2018-08-21 RX ADMIN — PIPERACILLIN SODIUM,TAZOBACTAM SODIUM 3.38 G: 3; .375 INJECTION, POWDER, FOR SOLUTION INTRAVENOUS at 16:39

## 2018-08-21 RX ADMIN — HYDROMORPHONE HYDROCHLORIDE 1 MG: 1 INJECTION, SOLUTION INTRAMUSCULAR; INTRAVENOUS; SUBCUTANEOUS at 01:37

## 2018-08-21 RX ADMIN — INSULIN GLARGINE 10 UNITS: 100 INJECTION, SOLUTION SUBCUTANEOUS at 10:47

## 2018-08-21 RX ADMIN — PIPERACILLIN SODIUM,TAZOBACTAM SODIUM 3.38 G: 3; .375 INJECTION, POWDER, FOR SOLUTION INTRAVENOUS at 01:37

## 2018-08-21 RX ADMIN — PROPOFOL 50 MG: 10 INJECTION, EMULSION INTRAVENOUS at 07:43

## 2018-08-21 RX ADMIN — PROPOFOL 100 MCG/KG/MIN: 10 INJECTION, EMULSION INTRAVENOUS at 07:35

## 2018-08-21 RX ADMIN — MIDAZOLAM HYDROCHLORIDE 2 MG: 1 INJECTION, SOLUTION INTRAMUSCULAR; INTRAVENOUS at 07:30

## 2018-08-21 RX ADMIN — HYDROMORPHONE HYDROCHLORIDE 0.5 MG: 1 INJECTION, SOLUTION INTRAMUSCULAR; INTRAVENOUS; SUBCUTANEOUS at 09:53

## 2018-08-21 RX ADMIN — LIDOCAINE HYDROCHLORIDE 40 MG: 20 INJECTION, SOLUTION EPIDURAL; INFILTRATION; INTRACAUDAL; PERINEURAL at 07:35

## 2018-08-21 RX ADMIN — HYDROMORPHONE HYDROCHLORIDE 1 MG: 1 INJECTION, SOLUTION INTRAMUSCULAR; INTRAVENOUS; SUBCUTANEOUS at 16:39

## 2018-08-21 RX ADMIN — PIPERACILLIN SODIUM,TAZOBACTAM SODIUM 3.38 G: 3; .375 INJECTION, POWDER, FOR SOLUTION INTRAVENOUS at 10:46

## 2018-08-21 NOTE — PROGRESS NOTES
Bedside shift change report given to Smita GORDON RN (oncoming nurse) by Patito Sales RN (offgoing nurse). Report included the following information SBAR, Kardex, Intake/Output, and MAR. Pt is non tele. 2035  Shift assessment, medicated for pain rated 4/10 to right foot. 2125  Pain reassessed - denies pain. Call received from OR nurse to get pt ready for surgery  at 0600.    0040  Pt asleep, no distress noted. 5062  Medicated for pain. 0230  Pain reassessed - denies pain. 0540  Pt states that pain is manageable, he does not want pain med at this time. Refused assistance with CHG bath. Pt prefers to give self the CHG cloth bath. 4419  Pt off unit to surgery. NPO status maintained. Bedside and Verbal shift change report given to Damien BAINS RN (oncoming nurse) by Mirta Higgins RN (offgoing nurse). Report included the following information SBAR, Kardex, Intake/Output and MAR. Pt is currently off the unit for surgery.

## 2018-08-21 NOTE — PROGRESS NOTES
INFECTIOUS DISEASE FOLLOW UP NOTE :-     Admit Date: 8/16/2018    ABX;      Current  Prior     zosyn 8/16-5 Vancomycin 8/16- 4      ASSESSMENT: -> RECS     Necrotizing Fasciitis R foot, R 5th toe OM  - s/p I&D, right 5th toe amputation 8/17, wd culture polymicrobial so far Citrobacter, Proteus GAS, viridans Strep, leana not excluded   -NEW PATH gangrene OM with inflammation extending to margin of resection   -NEW 8/21 Dr. Irene Moser I+D soft tissue and bone 5th MT - plantar abscess found -> continue Zosyn   ->await path from further resection today to determine if candidate for po abx- last margin not clear. SIRS/sepsis  - Leukocytosis, tachycardia present on admission  - due to above  - improving -> leukocytosis resolved. DM I  - on insulin x 20 years  -NOT controlled, A1 C 11 on admission ->need good BS control to help heal infection-per IM    H/o prior toe amputations  - L 3rd toe 2017, R 3rd toe 4/18/2018      MICROBIOLOGY:   8/16 Blcx x 2 - NTD  8/17 Wound cx - Citrobacter koseri [ pan S ], Proteus mirabilis [ Pan S ], GAS, strep viridans    Leana ip    LINES AND CATHETERS:   PIV    SUBJECTIVE :     Interval notes reviewed. Pt had further surgery to foot today, d/w him prior path margin concerning for residual infection, understand plantar abscess found and further 5th MT resected, will need to await path to determine if all infected bone out here.   Denied rash fever chill n/v/d     MEDICATIONS :     Current Facility-Administered Medications   Medication Dose Route Frequency    insulin glargine (LANTUS) injection 10 Units  10 Units SubCUTAneous Q12H    insulin lispro (HUMALOG) injection   SubCUTAneous AC&HS    piperacillin-tazobactam (ZOSYN) 3.375 g in  ml ##EXTENDED 4HRS INFUSION  3.375 g IntraVENous Q8H    oxyCODONE IR (ROXICODONE) tablet 5-10 mg  5-10 mg Oral Q4H PRN    HYDROmorphone (DILAUDID) syringe 1 mg  1 mg IntraVENous Q4H PRN    sodium chloride (NS) flush 5-10 mL  5-10 mL IntraVENous PRN    glucose chewable tablet 16 g  4 Tab Oral PRN    glucagon (GLUCAGEN) injection 1 mg  1 mg IntraMUSCular PRN    dextrose (D50W) injection syrg 12.5-25 g  25-50 mL IntraVENous PRN       OBJECTIVE :     Visit Vitals    /78 (BP 1 Location: Left arm, BP Patient Position: At rest)    Pulse 89    Temp 97.9 °F (36.6 °C)    Resp 16    Ht 6' 3\" (1.905 m)    Wt 80.7 kg (178 lb)    SpO2 100%    BMI 22.25 kg/m2       Temp (24hrs), Av.9 °F (36.6 °C), Min:97.2 °F (36.2 °C), Max:98.7 °F (37.1 °C)    GEN - WDWN BM lying in bed NAD  HEENT: no thrush moist oral mucosa  RESP- no rales rhonchi or wheeze  CVS- S1'S2' no murmurs   ABD-soft, NT, bs present   EXT-rt foot wrapped in post op dressing hemovac with serosang fluid   SKIN -no rash   NEURO - awake, alert, cooperative     Labs: Results:   Chemistry Recent Labs      18   0355  18   0450   GLU  156*  230*   NA  138  136   K  4.6  3.9   CL  100  97*   CO2  32  31   BUN  6*  5*   CREA  0.87  0.81   CA  8.6  8.1*   AGAP  6  8   BUCR  7*  6*      CBC w/Diff Recent Labs      18   0410   WBC  10.4   RBC  3.51*   HGB  9.7*   HCT  28.7*   PLT  502*        Path     TOE, RIGHT 5TH, AMPUTATION:   GANGRENE WITH ACUTE OSTEOMYELITIS. ACUTE INFLAMMATION EXTENDS TO RESECTION MARGIN. RADIOLOGY :    Xray foot  - IMPRESSION:  1. Recent postoperative changes involving the right foot fifth ray as above. Xray rt foot  - IMPRESSION: Soft tissue swelling and subcutaneous gas along the fifth digit. Widening of the proximal interphalangeal joint space and adjacent mild cortical  irregularity. Findings suspicious for early osteomyelitis.     Sang Fitzgerald MD  2018  [facility] AT THE [facility]  [phone]

## 2018-08-21 NOTE — BRIEF OP NOTE
BRIEF OPERATIVE NOTE    Date of Procedure: 8/21/2018   Preoperative Diagnosis: right foot infection, osteomyelitis  Postoperative Diagnosis: right foot infection, osteomyelitis    Procedure(s):  extensive EXCISIONAL DEBRIDEMENT of soft tissue and bone of fifth metartarsal with lavage right foot  Surgeon(s) and Role:     * TRAVON Blackwell DPM - Primary         Surgical Staff:  Circ-1: Savanah Schroeder RN  Scrub Tech-1: Lucia [facility]  Surg Asst-1: Ananda Gustafson  Event Time In   Incision Start 8680   Incision Close      Anesthesia: MAC   Estimated Blood Loss: 10cc  Specimens: Bone of the 5th metatarsal right foot  Findings: necrotic tissue with abcess plantar right foot   Complications: none  Implants: * No implants in log *

## 2018-08-21 NOTE — PROGRESS NOTES
[facility]  Hospitalist Division        Inpatient Daily Progress Note    Daily progress Note    Patient: Sandra Gill MRN: [id number]  [facility]: [id number]    YOB: 1985  Age: 26 y.o. Sex: male    DOA: 8/16/2018 LOS:  LOS: 5 days                    Chief Complaint:  Osteomyelitis right 5th toe, Necrotizing fasciitis right foot    Interval History: 27 y/o male with hx of DM I on insulin x 20 years with prior hx of amputations of left 3rd toe (2017) and right 3rd toe (4/18/2018), admitted to [facility] on 8/16 due to right foot gangrene. Two weeks PTA pt began noticing pain in his right 5th toe and it started to look white. He had been walking in the rain and through flooded areas with sneakers on. Pain worsened and developed purulent foul smelling drainage, leading him to present to the ED for further work up. Per EMR, noted to have gangrenous, black 5th right toe with dorsal erythema. Right foot xray showed soft tissue swelling and subcutaneous gas along 5th digit w/ widening of proximal interphalangeal joint space and adjacent mild cortical irregularity suspicious for early osteomyelitis. WBC of 32.5 on admission, A1C 11%, blood cultures collected 8/16 NGTD x 2. He was admitted on empiric Vancomycin and Zosyn. Podiatry and ID consulted. Pt taken to the OR by podiatry on 8/17  for I&D and right 5th toe amputation, per notes, found gray, dishwater pus planing through tissue,putrid malodor, gangrenous fifth toe. Wound culture with moderate Citrobacter Koseri, moderate proteus mirabilis 2 morphotypes same susceptibility, moderate streptococci beta hemolytic group A, many streptococcus viridans, anaerobic culture in progress. ID managing abx. WBC improved. S/p extensive excisional debridement of soft tissue and bone of fifth metartarsal w/ lavage right foot by Dr. Katelin Wakefield on 8/21.         Assessment/Plan:     Patient Active Problem List   Diagnosis Code    Type 1 diabetes mellitus without complication ([facility] [address] T77.2    Osteomyelitis ([address] M86.9    Necrotizing fasciitis due to microorganism ([address] M72.6    Gangrene of toe ([address] I96       A/P:  Necrotizing fasciitis right foot / Osteomyelitis right 5th toe  - Right foot xray showed soft tissue swelling and subcutaneous gas along 5th digit w/ widening of proximal interphalangeal joint space and adjacent mild cortical irregularity suspicious for early osteomyelitis  - ID, podiatry and wound care following  - s/p I&D, right 5th toe amputation on 8/17  - wound culture with moderate Citrobacter Koseri, moderate proteus mirabilis 2 morphotypes same susceptibility, moderate streptococci beta hemolytic group A, many streptococcus viridans, anaerobic culture in progress  - s/p extensive excisional debridement of soft tissue and bone of fifth metartarsal w/ lavage right foot on 8/21  - IV abx per ID - per note, likely able to do oral abx at d/c  - pain control    SIRS / Sepsis  - leukocytosis, tachycardia on admission  - due to above  - improving  - leukocytosis resolved    DM I  - diabetic diet, monitor accuchecks, correctional SSI, Lantus 10 units BID    DVT Prophylaxis  - SCD's BLE    Disposition  - await further recommendations from podiatry, should be able to d/c on oral abx per ID        HOWARD [place] [phone]  Pager:  [phone]  Office:  [phone]        Subjective: Interval notes reviewed. S/p extensive excisional debridement of soft tissue and bone of fifth metartarsal w/ lavage right foot today by Dr. Castillo Caro. No c/o pain at this time. Pt has an apartment now, living arrangements will be much better. Anticipating going back to work in September.     Objective:      Visit Vitals    /73 (BP 1 Location: Left arm, BP Patient Position: At rest)    Pulse 73    Temp 97.8 °F (36.6 °C)    Resp 16    Ht 6' 3\" (1.905 m)    Wt 80.7 kg (178 lb)    SpO2 100%    BMI 22.25 kg/m2           Physical Exam:  General appearance: alert, cooperative, no distress, appears stated age  Head: Normocephalic, without obvious abnormality, atraumatic  Lungs: clear to auscultation bilaterally  Heart: regular rate and rhythm, S1, S2 normal, no murmur, click, rub or gallop  Abdomen: soft, non tender, non distended. Normoactive bowel sounds. Extremities: ACE bandage to right foot with mild bloody drainage, drain in place, right 5th toe amputation, right 3rd toe amputation  Skin: Skin color, texture, turgor normal. No rashes or lesions  Neurologic: Grossly normal  PSY: Mood and affect normal, appropriately behaved        Intake and Output:  Current Shift:  08/21 0701 - 08/21 1900  In: 450 [I.V.:450]  Out: -   Last three shifts:  08/19 1901 - 08/21 0700  In: 1640 [P.O.:840;  I.V.:800]  Out: 1000 [Urine:1000]    Recent Results (from the past 24 hour(s))   GLUCOSE, POC    Collection Time: 08/20/18 11:56 AM   Result Value Ref Range    Glucose (POC) 220 (H) 70 - 110 mg/dL   GLUCOSE, POC    Collection Time: 08/20/18  4:17 PM   Result Value Ref Range    Glucose (POC) 88 70 - 110 mg/dL   GLUCOSE, POC    Collection Time: 08/20/18  7:23 PM   Result Value Ref Range    Glucose (POC) 166 (H) 70 - 110 mg/dL   GLUCOSE, POC    Collection Time: 08/20/18 10:57 PM   Result Value Ref Range    Glucose (POC) 243 (H) 70 - 448 mg/dL   METABOLIC PANEL, BASIC    Collection Time: 08/21/18  3:55 AM   Result Value Ref Range    Sodium 138 136 - 145 mmol/L    Potassium 4.6 3.5 - 5.5 mmol/L    Chloride 100 100 - 108 mmol/L    CO2 32 21 - 32 mmol/L    Anion gap 6 3.0 - 18 mmol/L    Glucose 156 (H) 74 - 99 mg/dL    BUN 6 (L) 7.0 - 18 MG/DL    Creatinine 0.87 0.6 - 1.3 MG/DL    BUN/Creatinine ratio 7 (L) 12 - 20      GFR est AA >60 >60 ml/min/1.73m2    GFR est non-AA >60 >60 ml/min/1.73m2    Calcium 8.6 8.5 - 10.1 MG/DL   Lolis Jay    Collection Time: 08/21/18  3:55 AM   Result Value Ref Range Vancomycin,trough 6.2 (L) 10.0 - 20.0 ug/mL    Reported dose date: 20180820      Reported dose time: 2300      Reported dose: 1500 MG UNITS   GLUCOSE, POC    Collection Time: 08/21/18  6:38 AM   Result Value Ref Range    Glucose (POC) 130 (H) 70 - 110 mg/dL           Lab Results   Component Value Date/Time    Glucose 156 (H) 08/21/2018 03:55 AM    Glucose 230 (H) 08/19/2018 04:50 AM    Glucose 353 (H) 08/18/2018 04:47 AM    Glucose 294 (H) 08/17/2018 03:50 AM    Glucose 267 (H) 08/16/2018 07:45 PM        Imaging:  Xr Foot Rt Ap/lat    Result Date: 8/20/2018  Examination: Right foot 2 views. HISTORY: Right foot infection status post amputation. FINDINGS: AP, and lateral views of the right foot are performed and interpreted without comparison. Interval amputation of the right foot small toe at the MTP joint. Chronic amputation deformity of the second toe at the MTP joint redemonstrated. Small amount of soft tissue gas at the recent surgical site, not unexpected. No retained radiopaque foreign object. No fracture. IMPRESSION: 1. Recent postoperative changes involving the right foot fifth ray as above.       Medication List Reviewed:  Current Facility-Administered Medications   Medication Dose Route Frequency    lactated Ringers infusion  100 mL/hr IntraVENous CONTINUOUS    sodium chloride (NS) flush 5-10 mL  5-10 mL IntraVENous PRN    oxyCODONE-acetaminophen (PERCOCET) 5-325 mg per tablet 1 Tab  1 Tab Oral PRN    HYDROmorphone (DILAUDID) injection 0.2 mg  0.2 mg IntraVENous PRN    HYDROmorphone (DILAUDID) injection 0.5 mg  0.5 mg IntraVENous Multiple    diphenhydrAMINE (BENADRYL) injection 12.5 mg  12.5 mg IntraVENous Multiple    insulin lispro (HUMALOG) injection   SubCUTAneous ONCE    glucose chewable tablet 16 g  4 Tab Oral PRN    glucagon (GLUCAGEN) injection 1 mg  1 mg IntraMUSCular PRN    dextrose (D50W) injection syrg 12.5-25 g  25-50 mL IntraVENous PRN    insulin glargine (LANTUS) injection 10 Units  10 Units SubCUTAneous Q12H    insulin lispro (HUMALOG) injection   SubCUTAneous AC&HS    piperacillin-tazobactam (ZOSYN) 3.375 g in  ml ##EXTENDED 4HRS INFUSION  3.375 g IntraVENous Q8H    oxyCODONE IR (ROXICODONE) tablet 5-10 mg  5-10 mg Oral Q4H PRN    HYDROmorphone (DILAUDID) syringe 1 mg  1 mg IntraVENous Q4H PRN    sodium chloride (NS) flush 5-10 mL  5-10 mL IntraVENous PRN    glucose chewable tablet 16 g  4 Tab Oral PRN    glucagon (GLUCAGEN) injection 1 mg  1 mg IntraMUSCular PRN    dextrose (D50W) injection syrg 12.5-25 g  25-50 mL IntraVENous PRN     Facility-Administered Medications Ordered in Other Encounters   Medication Dose Route Frequency    midazolam (VERSED) injection   IntraVENous PRN    lidocaine (PF) (XYLOCAINE) 20 mg/mL (2 %) injection   IntraVENous PRN    propofol (DIPRIVAN) 10 mg/mL injection   IntraVENous CONTINUOUS    fentaNYL citrate (PF) injection   IntraVENous PRN    propofol (DIPRIVAN) 10 mg/mL injection   IntraVENous PRN    ondansetron (ZOFRAN) injection   IntraVENous PRN    ketorolac (TORADOL) injection    PRN

## 2018-08-21 NOTE — PROGRESS NOTES
conducted a pre-surgery visit with Valarie Bernal, who is a 26 y. o.,male. The  provided the following Interventions:  Initiated a relationship of care and support. Offered prayer and assurance of continued prayers on patient's behalf. Plan:  Chaplains will continue to follow and will provide pastoral care on an as needed/requested basis.  recommends bedside caregivers page  on duty if patient shows signs of acute spiritual or emotional distress.     Quincy Gamble   Spiritual Care   [phone]

## 2018-08-21 NOTE — PROGRESS NOTES
Problem: Mobility Impaired (Adult and Pediatric)  Goal: *Acute Goals and Plan of Care (Insert Text)  Outcome: Progressing Towards Goal  PHYSICAL THERAPY: Initial Assessment/Discharge   INPATIENT: Self-pay: Hospital Day: 6     Patient: Jensen Ghosh (26 y.o. male)    Date: 8/21/2018  Primary Diagnosis: Right foot infection   Osteomyelitis ([facility] [address]  right foot infection, osteomyelitis   Procedure(s) (LRB):  extensive EXCISIONAL DEBRIDEMENT of soft tissue and bone of fifth metartarsal with lavage right foot (Right), Day of Surgery   Precautions: Fall (NWB RLE)  NWB RLE  PLOF: Independent    ASSESSMENT AND RECOMMENDATIONS:  Based on the objective data described below, the patient presents with appropriate mobility for discharge to home. Educated on NWB RLE; verbalized understanding. Educated on importance of compliance with NWB RLE to aid in healing process. Mod I for supine to sit. Good seated balance. Mod I for sit to stand with crutches. Amb 75ft with mod I and crutches. Steady balance. Demonstrated compliance with NWB % of mobility. Returned to bed with mod I. Crutches to be issued by rehab tech this afternoon. Patient declines stair training. Provided education for proper negotiation with crutches and bilateral handrails to be compliant with NWB RLE. Patient verbalized understanding and provided verbal teach back of proper negotiation. Denies further mobility concerns with return home. All needs in reach. RN [facility] aware. Skilled physical therapy is not indicated at this time.     EDUCATION:   Education:  Patient was educated on the following topics: Bed mobility, transfers, ADLs, balance, amb, crutches, safety, exercise, role of PT, plan of care, cognition, skin integrity, vitals     Barriers to Learning/Limitations: None  Compensate with: visual, verbal, tactile, kinesthetic cues/mode  Discharge Recommendations: None  Further Equipment Recommendations for Discharge: crutches      SUBJECTIVE:   Patient stated I want to enjoy my new apartment.     OBJECTIVE DATA SUMMARY:     Past Medical History:   Diagnosis Date    Diabetes ([facility] [address] 1998     Past Surgical History:   Procedure Laterality Date    HX AMPUTATION TOE      left and right foot    HX CYST INCISION AND DRAINAGE Right 08/21/2018    right 5th metatarsal (osteomyelitis)     Eval Complexity: History: MEDIUM  Complexity : 1-2 comorbidities / personal factors will impact the outcome/ POC Exam:MEDIUM Complexity : 3 Standardized tests and measures addressing body structure, function, activity limitation and / or participation in recreation  Presentation: MEDIUM Complexity : Evolving with changing characteristics  Clinical Decision Making:Medium Complexity [facility] Standing Balance Scale 4+/5 Overall Complexity:MEDIUM    G CODE:  Mobility [id number] Current  CI= 1-19%  D/C  CI= 1-19%. The severity rating is based on the Other [employer] Balance Scale 4+/5    [employer] Balance Scale 4+/5  0: Pt performs 25% or less of standing activity (Max assist) CN, 100% impaired. 1: Pt supports self with upper extremities but requires therapist assistance. Pt performs 25-50% of effort (Mod assist) CM, 80% to <100% impaired. 1+: Pt supports self with upper extremities but requires therapist assistance. Pt performs >50% effort. (Min assist). CL, 60% to <80% impaired. 2: Pt supports self independently with both upper extremities (walker, crutches, parallel bars). CL, 60% to <80% impaired. 2+: Pt support self independently with 1 upper extremity (cane, crutch, 1 parallel bar). CK, 40% to <60% impaired. 3: Pt stands without upper extremity support for up to 30 seconds. CK, 40% to <60% impaired. 3+: Pt stands without upper extremity support for 30 seconds or greater. CJ, 20% to <40% impaired. 4: Pt independently moves and returns center of gravity 1-2 inches in one plane. CJ, 20% to <40% impaired.   4+: Pt independently moves and returns center of gravity 1-2 inches in multiple planes. CI, 1% to <20% impaired. 5: Pt independently moves and returns center of gravity in all planes greater than 2 inches. CH, 0% impaired.     Prior Level of Function/Home Situation: Independent  Home Situation  Home Environment: Apartment  # Steps to Enter: 15  Rails to Enter: Yes  Hand Rails : Bilateral  One/Two Story Residence: Two story (second floor apt)  Living Alone: Yes  Support Systems: Friends \ neighbors  Patient Expects to be Discharged to[de-identified] Apartment  Current DME Used/Available at Home: None  Critical Behavior:  Neurologic State: Alert  Orientation Level: Oriented X4  Cognition: Follows commands  Safety/Judgement: Decreased insight into deficits  Psychosocial  Patient Behaviors: Cooperative    Manual Muscle Testing (LE)         R     L    Hip Flexion:   5/5 5/5  Knee EXT:   5/5 5/5  Knee FLEX:   5/5 5/5  Ankle DF:   Not tested 5/5  _________________________________________________   Tone : BLE normal  Sensation: Not assessed  Range Of Motion: BLE AROM WFL    Functional Mobility:      Functional Status     Indep   (I)   Mod I   Super-vision   Min A   Mod A   Max A   Total A   Assist x2 Verbal cues Additional time Not tested   Comments   Rolling []  []  [] []    []    []  []  [] [] [] [x]    Supine to sit []  [x]  [] []  []  []  []  [] [] [] []    Sit to supine []  [x]  [] []  []  []  []  [] [] [] []    Sit to stand []  [x]  [] []  []  []  []  [] [] [] []    Stand to sit []  [x]  [] []  []  []  []  [] [] [] []    Bed to chair transfers []  []  [] []  []  []  []  [] [] [] [x]        Balance    Good   Fair   Poor   Unable   Not tested   Comments   Sitting static [x]  []  []  []  []    Sitting dynamic [x]  []  []  []  []    Standing static [x]  []  []  []  []    Standing dynamic [x]  []  []  []  []      Mobility/Gait:   Level of Assistance: Modified independent  Assistive Device: crutches  Distance Ambulated: 75 feet     Right Lower Extremity: NWB    Stairs:   Patient declines. Provided education for proper negotiation with crutches and bilateral handrails to be compliant with NWB RLE. Verbalized understanding. Patient provided verbal teach back of proper negotiation. Therapeutic Exercises:   Seated EOB 3 minutes    Pain:  Pre treatment pain: 1  Post treatment pain: 1  Pain Scale 1: Numeric (0 - 10)  Pain Intensity 1: 1  Pain Location 1: Foot  Pain Orientation 1: Right  Pain Description 1: Sore  Pain Intervention(s) 1: Medication (see MAR)    Activity Tolerance:    Good  Please refer to the flowsheet for vital signs taken during this treatment. After treatment:   []         Patient left in no apparent distress sitting up in chair  [x]         Patient left in no apparent distress in bed  [x]         Call bell left within reach  [x]         Nursing notified  []         Caregiver present  []         Bed alarm activated    COMMUNICATION/EDUCATION:   [x]         Fall prevention education was provided and the patient/caregiver indicated understanding. [x]         Patient/family have participated as able in goal setting and plan of care. [x]         Patient/family agree to work toward stated goals and plan of care. []         Patient understands intent and goals of therapy, but is neutral about his/her participation. []         Patient is unable to participate in goal setting and plan of care.       Thank you for this referral.  Sunita Douglas, PT   Time Calculation: 13 mins

## 2018-08-21 NOTE — PERIOP NOTES
4203  Assumed care of patient upon arrival to PACU. Patient alert, talking. Placed on monitor x3. VSS.

## 2018-08-21 NOTE — ANESTHESIA PREPROCEDURE EVALUATION
Anesthetic History   No history of anesthetic complications            Review of Systems / Medical History  Patient summary reviewed and pertinent labs reviewed    Pulmonary  Within defined limits                 Neuro/Psych   Within defined limits           Cardiovascular              CAD and PAD    Exercise tolerance: >4 METS     GI/Hepatic/Renal  Within defined limits              Endo/Other    Diabetes         Other Findings   Comments: Documentation of current medication  Current medications obtained, documented and obtained? YES      Risk Factors for Postoperative nausea/vomiting:       History of postoperative nausea/vomiting? NO       Female? NO       Motion sickness? NO       Intended opioid administration for postoperative analgesia? NO      Smoking Abstinence:  Current Smoker? YES  Elective Surgery? YES  Seen preoperatively by anesthesiologist or proxy prior to day of surgery? YES  Pt abstained from smoking 24 hours prior to anesthesia?  NO    Preventive care/screening for High Blood Pressure:  Aged 18 years and older: YES  Screened for high blood pressure: YES  Patients with high blood pressure referred to primary care provider   for BP management: YES                 Physical Exam    Airway  Mallampati: II  TM Distance: 4 - 6 cm  Neck ROM: normal range of motion   Mouth opening: Normal     Cardiovascular  Regular rate and rhythm,  S1 and S2 normal,  no murmur, click, rub, or gallop  Rhythm: regular  Rate: normal         Dental    Dentition: Poor dentition     Pulmonary  Breath sounds clear to auscultation               Abdominal  GI exam deferred       Other Findings            Anesthetic Plan    ASA: 3  Anesthesia type: MAC          Induction: Intravenous  Anesthetic plan and risks discussed with: Patient

## 2018-08-21 NOTE — PROGRESS NOTES
Problem: Falls - Risk of  Goal: *Absence of Falls  Document Baljinder Fall Risk and appropriate interventions in the flowsheet. Outcome: Progressing Towards Goal  Fall Risk Interventions:  Mobility Interventions: Communicate number of staff needed for ambulation/transfer, Patient to call before getting OOB         Medication Interventions: Teach patient to arise slowly, Patient to call before getting OOB    Elimination Interventions: Patient to call for help with toileting needs, Call light in reach             Problem: Pressure Injury - Risk of  Goal: *Prevention of pressure injury  Document Jonathan Scale and appropriate interventions in the flowsheet.    Outcome: Progressing Towards Goal  Pressure Injury Interventions:  Sensory Interventions: Assess changes in LOC, Check visual cues for pain, Keep linens dry and wrinkle-free, Minimize linen layers    Moisture Interventions: Maintain skin hydration (lotion/cream), Minimize layers, Absorbent underpads    Activity Interventions: PT/OT evaluation, Pressure redistribution bed/mattress(bed type)    Mobility Interventions: HOB 30 degrees or less, Pressure redistribution bed/mattress (bed type)    Nutrition Interventions: Document food/fluid/supplement intake

## 2018-08-22 LAB
BACTERIA SPEC CULT: NORMAL
BACTERIA SPEC CULT: NORMAL
GLUCOSE BLD STRIP.AUTO-MCNC: 215 MG/DL (ref 70–110)
GLUCOSE BLD STRIP.AUTO-MCNC: 264 MG/DL (ref 70–110)
GLUCOSE BLD STRIP.AUTO-MCNC: 266 MG/DL (ref 70–110)
GLUCOSE BLD STRIP.AUTO-MCNC: 285 MG/DL (ref 70–110)
SERVICE CMNT-IMP: NORMAL
SERVICE CMNT-IMP: NORMAL

## 2018-08-22 PROCEDURE — 74011000258 HC RX REV CODE- 258: Performed by: INTERNAL MEDICINE

## 2018-08-22 PROCEDURE — 74011250636 HC RX REV CODE- 250/636: Performed by: HOSPITALIST

## 2018-08-22 PROCEDURE — 74011636637 HC RX REV CODE- 636/637: Performed by: HOSPITALIST

## 2018-08-22 PROCEDURE — 82962 GLUCOSE BLOOD TEST: CPT

## 2018-08-22 PROCEDURE — 74011250636 HC RX REV CODE- 250/636: Performed by: INTERNAL MEDICINE

## 2018-08-22 PROCEDURE — 74011250637 HC RX REV CODE- 250/637: Performed by: INTERNAL MEDICINE

## 2018-08-22 PROCEDURE — 74011636637 HC RX REV CODE- 636/637: Performed by: NURSE PRACTITIONER

## 2018-08-22 PROCEDURE — 65270000029 HC RM PRIVATE

## 2018-08-22 RX ORDER — METRONIDAZOLE 250 MG/1
500 TABLET ORAL 3 TIMES DAILY
Status: DISCONTINUED | OUTPATIENT
Start: 2018-08-22 | End: 2018-08-23

## 2018-08-22 RX ORDER — INSULIN GLARGINE 100 [IU]/ML
11 INJECTION, SOLUTION SUBCUTANEOUS EVERY 12 HOURS
Status: DISCONTINUED | OUTPATIENT
Start: 2018-08-22 | End: 2018-08-22

## 2018-08-22 RX ORDER — INSULIN GLARGINE 100 [IU]/ML
12 INJECTION, SOLUTION SUBCUTANEOUS EVERY 12 HOURS
Status: DISCONTINUED | OUTPATIENT
Start: 2018-08-22 | End: 2018-08-23

## 2018-08-22 RX ADMIN — INSULIN LISPRO 9 UNITS: 100 INJECTION, SOLUTION INTRAVENOUS; SUBCUTANEOUS at 21:54

## 2018-08-22 RX ADMIN — INSULIN LISPRO 6 UNITS: 100 INJECTION, SOLUTION INTRAVENOUS; SUBCUTANEOUS at 09:22

## 2018-08-22 RX ADMIN — HYDROMORPHONE HYDROCHLORIDE 1 MG: 1 INJECTION, SOLUTION INTRAMUSCULAR; INTRAVENOUS; SUBCUTANEOUS at 21:55

## 2018-08-22 RX ADMIN — INSULIN GLARGINE 10 UNITS: 100 INJECTION, SOLUTION SUBCUTANEOUS at 09:23

## 2018-08-22 RX ADMIN — INSULIN LISPRO 9 UNITS: 100 INJECTION, SOLUTION INTRAVENOUS; SUBCUTANEOUS at 12:48

## 2018-08-22 RX ADMIN — CEFTRIAXONE 2 G: 2 INJECTION, POWDER, FOR SOLUTION INTRAMUSCULAR; INTRAVENOUS at 13:59

## 2018-08-22 RX ADMIN — PIPERACILLIN SODIUM,TAZOBACTAM SODIUM 3.38 G: 3; .375 INJECTION, POWDER, FOR SOLUTION INTRAVENOUS at 09:06

## 2018-08-22 RX ADMIN — METRONIDAZOLE 500 MG: 250 TABLET ORAL at 15:47

## 2018-08-22 RX ADMIN — METRONIDAZOLE 500 MG: 250 TABLET ORAL at 21:53

## 2018-08-22 RX ADMIN — INSULIN LISPRO 9 UNITS: 100 INJECTION, SOLUTION INTRAVENOUS; SUBCUTANEOUS at 17:56

## 2018-08-22 RX ADMIN — HYDROMORPHONE HYDROCHLORIDE 1 MG: 1 INJECTION, SOLUTION INTRAMUSCULAR; INTRAVENOUS; SUBCUTANEOUS at 04:41

## 2018-08-22 RX ADMIN — HYDROMORPHONE HYDROCHLORIDE 1 MG: 1 INJECTION, SOLUTION INTRAMUSCULAR; INTRAVENOUS; SUBCUTANEOUS at 09:03

## 2018-08-22 RX ADMIN — INSULIN GLARGINE 11 UNITS: 100 INJECTION, SOLUTION SUBCUTANEOUS at 21:53

## 2018-08-22 RX ADMIN — PIPERACILLIN SODIUM,TAZOBACTAM SODIUM 3.38 G: 3; .375 INJECTION, POWDER, FOR SOLUTION INTRAVENOUS at 00:44

## 2018-08-22 RX ADMIN — HYDROMORPHONE HYDROCHLORIDE 1 MG: 1 INJECTION, SOLUTION INTRAMUSCULAR; INTRAVENOUS; SUBCUTANEOUS at 00:44

## 2018-08-22 RX ADMIN — HYDROMORPHONE HYDROCHLORIDE 1 MG: 1 INJECTION, SOLUTION INTRAMUSCULAR; INTRAVENOUS; SUBCUTANEOUS at 12:49

## 2018-08-22 RX ADMIN — HYDROMORPHONE HYDROCHLORIDE 1 MG: 1 INJECTION, SOLUTION INTRAMUSCULAR; INTRAVENOUS; SUBCUTANEOUS at 17:53

## 2018-08-22 NOTE — PROGRESS NOTES
Problem: Falls - Risk of  Goal: *Absence of Falls  Document Baljinder Fall Risk and appropriate interventions in the flowsheet. Outcome: Progressing Towards Goal  Fall Risk Interventions:  Mobility Interventions: Communicate number of staff needed for ambulation/transfer         Medication Interventions: Evaluate medications/consider consulting pharmacy, Patient to call before getting OOB, Teach patient to arise slowly    Elimination Interventions: Call light in reach, Patient to call for help with toileting needs, Urinal in reach             Problem: Pressure Injury - Risk of  Goal: *Prevention of pressure injury  Document Jonathan Scale and appropriate interventions in the flowsheet.    Outcome: Progressing Towards Goal  Pressure Injury Interventions:  Sensory Interventions: Assess changes in LOC, Check visual cues for pain, Discuss PT/OT consult with provider    Moisture Interventions: Absorbent underpads, Maintain skin hydration (lotion/cream), Minimize layers    Activity Interventions: Pressure redistribution bed/mattress(bed type), PT/OT evaluation    Mobility Interventions: PT/OT evaluation, Pressure redistribution bed/mattress (bed type)    Nutrition Interventions: Document food/fluid/supplement intake

## 2018-08-22 NOTE — PROGRESS NOTES
[facility]  Hospitalist Division        Inpatient Daily Progress Note    Daily progress Note    Patient: Patrick Martinez MRN: [id number]  CSN: [id number]    YOB: 1985  Age: 26 y.o. Sex: male    DOA: 8/16/2018 LOS:  LOS: 6 days                    Chief Complaint:  Osteomyelitis right 5th toe, Necrotizing fasciitis right foot    Interval History: 25 y/o male with hx of DM I on insulin x 20 years with prior hx of amputations of left 3rd toe (2017) and right 3rd toe (4/18/2018), admitted to [facility] on 8/16 due to right foot gangrene. Two weeks PTA pt began noticing pain in his right 5th toe and it started to look white. He had been walking in the rain and through flooded areas with sneakers on. Pain worsened and developed purulent foul smelling drainage, leading him to present to the ED for further work up. Per EMR, noted to have gangrenous, black 5th right toe with dorsal erythema. Right foot xray showed soft tissue swelling and subcutaneous gas along 5th digit w/ widening of proximal interphalangeal joint space and adjacent mild cortical irregularity suspicious for early osteomyelitis. WBC of 32.5 on admission, A1C 11%, blood cultures collected 8/16 NGTD x 2. He was admitted on empiric Vancomycin and Zosyn. Podiatry and ID consulted. Pt taken to the OR by podiatry on 8/17  for I&D and right 5th toe amputation, per notes, found gray, dishwater pus planing through tissue,putrid malodor, gangrenous fifth toe. Wound culture with moderate Citrobacter Koseri, moderate proteus mirabilis 2 morphotypes same susceptibility, moderate streptococci beta hemolytic group A, many streptococcus viridans, anaerobic culture in progress. ID managing abx. WBC improved. S/p extensive excisional debridement of soft tissue and bone of fifth metartarsal w/ lavage right foot by Dr. Ana M Sadler on 8/21. For first dressing change with podiatry on 8/22.       Assessment/Plan:     Patient Active Problem List   Diagnosis Code    Type 1 diabetes mellitus without complication ([facility] [address] R58.4    Osteomyelitis ([facility] [address] M86.9    Necrotizing fasciitis due to microorganism ([facility] [address] M72.6    Gangrene of toe ([facility] [address] I96       A/P:  Necrotizing fasciitis right foot / Osteomyelitis right 5th toe  - Right foot xray showed soft tissue swelling and subcutaneous gas along 5th digit w/ widening of proximal interphalangeal joint space and adjacent mild cortical irregularity suspicious for early osteomyelitis  - ID, podiatry and wound care following  - s/p I&D, right 5th toe amputation on 8/17  - wound culture with moderate Citrobacter Koseri, moderate proteus mirabilis 2 morphotypes same susceptibility, moderate streptococci beta hemolytic group A, many streptococcus viridans, anaerobic culture in progress  - s/p extensive excisional debridement of soft tissue and bone of fifth metartarsal w/ lavage right foot on 8/21  - IV abx per ID - await path from further resection to determine if candidate for oral abx, last margin not clear  - pain control    SIRS / Sepsis  - leukocytosis, tachycardia on admission  - due to above  - improving  - leukocytosis resolved    DM I  - diabetic diet, monitor accuchecks, correctional SSI, Lantus 10 units BID    DVT Prophylaxis  - SCD's BLE    Disposition  - await further recommendations from podiatry        Nilda Hein 15  Pager:  [phone]  Office:  [phone]        Subjective:     A bit frustrated this morning due to pain (just medicated by primary nurse) and because his blood sugars were elevated. Also frustrated because he wants to return to work by September 1st and unable to be cleared at this time by podiatry. NWB RLE, working with PT. Did well with crutches.     Objective:      Visit Vitals    /82    Pulse 81    Temp 98.7 °F (37.1 °C)    Resp 16    Ht 6' 3\" (1.905 m)    Wt 72.5 kg (159 lb 13.3 oz)    SpO2 99%    BMI 19.98 kg/m2           Physical Exam:  General appearance: alert, cooperative, no distress, appears stated age  Head: Normocephalic, without obvious abnormality, atraumatic  Lungs: clear to auscultation bilaterally  Heart: regular rate and rhythm, S1, S2 normal, no murmur, click, rub or gallop  Abdomen: soft, non tender, non distended. Normoactive bowel sounds. Extremities: ACE bandage to right foot with small amount of dried bloody drainage, drain in place, right 5th toe amputation, right 3rd toe amputation  Skin: Skin color, texture, turgor normal. No rashes or lesions  Neurologic: Grossly normal  PSY: Mood and affect normal, appropriately behaved        Intake and Output:  Current Shift:     Last three shifts:  08/20 1901 - 08/22 0700  In: 1850 [P.O.:1200; I.V.:650]  Out: 200 [Urine:200]    Recent Results (from the past 24 hour(s))   GLUCOSE, POC    Collection Time: 08/21/18 12:18 PM   Result Value Ref Range    Glucose (POC) 242 (H) 70 - 110 mg/dL   GLUCOSE, POC    Collection Time: 08/21/18  5:02 PM   Result Value Ref Range    Glucose (POC) 246 (H) 70 - 110 mg/dL   GLUCOSE, POC    Collection Time: 08/21/18 10:12 PM   Result Value Ref Range    Glucose (POC) 193 (H) 70 - 110 mg/dL   GLUCOSE, POC    Collection Time: 08/22/18  7:00 AM   Result Value Ref Range    Glucose (POC) 215 (H) 70 - 110 mg/dL           Lab Results   Component Value Date/Time    Glucose 156 (H) 08/21/2018 03:55 AM    Glucose 230 (H) 08/19/2018 04:50 AM    Glucose 353 (H) 08/18/2018 04:47 AM    Glucose 294 (H) 08/17/2018 03:50 AM    Glucose 267 (H) 08/16/2018 07:45 PM        Imaging:  No results found.     Medication List Reviewed:  Current Facility-Administered Medications   Medication Dose Route Frequency    insulin glargine (LANTUS) injection 10 Units  10 Units SubCUTAneous Q12H    insulin lispro (HUMALOG) injection   SubCUTAneous AC&HS    piperacillin-tazobactam (ZOSYN) 3.375 g in  ml ##EXTENDED 4HRS INFUSION  3.375 g IntraVENous Q8H    oxyCODONE IR (ROXICODONE) tablet 5-10 mg  5-10 mg Oral Q4H PRN    HYDROmorphone (DILAUDID) syringe 1 mg  1 mg IntraVENous Q4H PRN    sodium chloride (NS) flush 5-10 mL  5-10 mL IntraVENous PRN    glucose chewable tablet 16 g  4 Tab Oral PRN    glucagon (GLUCAGEN) injection 1 mg  1 mg IntraMUSCular PRN    dextrose (D50W) injection syrg 12.5-25 g  25-50 mL IntraVENous PRN

## 2018-08-22 NOTE — PROGRESS NOTES
Podiatry Surgery Progress Note      Patient: Iris Leigh MRN: [id number]  SSN: [id number]    YOB: 1985  Age: 26 y.o. Sex: male      Assessment:     Patient Active Problem List   Diagnosis Code    Type 1 diabetes mellitus without complication ([facility] L27.9    Osteomyelitis ([facility] M86.9    Necrotizing fasciitis due to microorganism ([facility] M72.6    Gangrene of toe ([facility] I96          Plan:   Continue IV anftibx. He must remain NWB right foot. Changed drain which is still productive. Will change dressing tomorrow. Total time spent with patient: 30 [phone] [address] discussed with: Patient    Discussed:  Care Plan    Disposition:  Stable      Mr. Travis Eric is a 26 y.o. male who was seen at bedside today. He states he does have some pain today over the right foot. He is concerned and would like to return to work by the first of September. Subjective:   Past Medical History  Past Medical History:   Diagnosis Date    Diabetes ([facility] 1998     Social History     Social History    Marital status: SINGLE     Spouse name: N/A    Number of children: N/A    Years of education: N/A     Occupational History    Not on file.      Social History Main Topics    Smoking status: Current Every Day Smoker     Packs/day: 0.50    Smokeless tobacco: Never Used    Alcohol use No    Drug use: No    Sexual activity: Not on file     Other Topics Concern    Not on file     Social History Narrative       Current Medications  Current Facility-Administered Medications   Medication Dose Route Frequency Provider Last Rate Last Dose    insulin glargine (LANTUS) injection 10 Units  10 Units SubCUTAneous Q12H Jesusita Li DO   10 Units at 08/22/18 0832    insulin lispro (HUMALOG) injection   SubCUTAneous AC&HS Bindu Lopez NP   6 Units at 08/22/18 0922    piperacillin-tazobactam (ZOSYN) 3.375 g in  ml ##EXTENDED 4HRS INFUSION  3.375 g IntraVENous Dior Craig MD 25 mL/hr at 08/22/18 0906 3.375 g at 08/22/18 0906    oxyCODONE IR (ROXICODONE) tablet 5-10 mg  5-10 mg Oral Q4H PRN Olive Mauricio, DO   10 mg at 08/18/18 1806    HYDROmorphone (DILAUDID) syringe 1 mg  1 mg IntraVENous Q4H PRN Olive Mauricio, DO   1 mg at 08/22/18 7127    sodium chloride (NS) flush 5-10 mL  5-10 mL IntraVENous PRN Blase Goltz, MD   10 mL at 08/17/18 1658    glucose chewable tablet 16 g  4 Tab Oral PRN Sunshine Meeks MD        glucagon [facility]) injection 1 mg  1 mg IntraMUSCular PRN Sunshine Meeks MD        dextrose (D50W) injection syrg 12.5-25 g  25-50 mL IntraVENous PRN Sunshine Meeks MD           Patient Allergies  No Known Allergies       Objective:   General Exam  alert, cooperative, no distress, appears stated age    Vitals  Visit Vitals    /82    Pulse 81    Temp 98.7 °F (37.1 °C)    Resp 16    Ht 6' 3\" (1.905 m)    Wt 72.5 kg (159 lb 13.3 oz)    SpO2 99%    BMI 19.98 kg/m2       REVIEW OF SYSTEMS:  General: denies chronic fatigue, weight loss, fever, anemia, bruising, depression, nervousness, panic attacks  HEENT: denies ringing in ears, ear infections, dizzy spells, poor vision, glaucoma, sinus trouble, hoarseness, eye infections  GI: denies diarrhea, gas, bloating, heartburn, regurgitation, difficulty swallowing, painful swallowing, nausea, vomiting, constipation, abdominal pain, decreased appetite, blood in stools, black stools, jaundice, dark urine  Lungs: denies pneumonia, asthma, cough, SOB, hemoptysis  Heart: denies chest pain, irregular heart beat, ankle swelling, HTN  Skin: denies rashes, hives, allergic reaction  Urinary: denies UTI, kidney stones, decreased urine force and flow, urination at night, blood in urine, painful urination  Bones and Joints: denies arthritis, rheumatism, foot pain, gout, osteoporosis  Neurologic: denies stroke, seizures, headaches, numbness, tingling    Foot Exam  Vascular: Dorsalis Pedis 2/4 and Posterior Tibial 2/4 Bilaterally. Pedal hair is decreased. There  are not varicosities present. Pedal edema is decrease right foot.       Neurological:  Light touch protective sensation is decreased to both feet.  There are non reproducible paresthesias to bilateral lower extremities.  There are no Tinel's or 's signs present to the nerves crossing the ankle joint.      Orthopedic:  Gross abnormalities present with multiple digital amputations. Manual muscle testing is 5/5 right and 5/5 left for all remaining groups traversing the ankles.       Dermatological: digits warm and pink right foot with dressing intact and noted strike through  blood over the dorsal lateral right foot. Wound Foot Right (Active)   DRESSING STATUS Clean, dry, and intact 2018 10:01 AM   DRESSING TYPE Elastic bandage 2018  8:00 PM   Incision site well approximated? No 2018  9:00 AM   Tissue Type Black 2018  4:00 AM   Drainage Amount  None 2018  8:00 PM   Drainage Color Serous 2018  4:00 AM   Wound Odor None 2018  8:00 PM   Number of days:5       Wound Foot Right;Lateral (Active)   DRESSING STATUS Clean, dry, and intact 2018 10:01 AM   DRESSING TYPE 4 x 4;ABD pad;Sutures; Xeroform 2018  8:53 AM   Incision site well approximated? Yes 2018  8:53 AM   Number of days:1       [REMOVED] Wound Foot Right (Removed)   Removed 18 1401   DRESSING STATUS Moist;New drainage; Old drainage 2018  8:10 AM   DRESSING TYPE Open to air 2018  8:10 AM   Non-Pressure Injury Partial thickness (epider/derm) 2018  8:10 AM   Tissue Type Black 2018  8:10 AM   Drainage Amount  Small  2018  8:10 AM   Drainage Color Black 2018  8:10 AM   Wound Odor Foul 2018  8:10 AM   Periwound Skin Condition Edematous 2018  8:10 AM   Number of days:0        Labs  Recent Results (from the past 24 hour(s))   GLUCOSE, POC    Collection Time: 18 12:18 PM   Result Value Ref Range    Glucose (POC) 242 (H) 70 - 110 mg/dL   GLUCOSE, POC    Collection Time: 08/21/18  5:02 PM   Result Value Ref Range    Glucose (POC) 246 (H) 70 - 110 mg/dL   GLUCOSE, POC    Collection Time: 08/21/18 10:12 PM   Result Value Ref Range    Glucose (POC) 193 (H) 70 - 110 mg/dL   GLUCOSE, POC    Collection Time: 08/22/18  7:00 AM   Result Value Ref Range    Glucose (POC) 215 (H) 70 - 110 mg/dL       X-Ray:  reviewed    Procedures:   Extensive excisional debridement of soft tissue and partial resection of the 5th metatarsal with lavage right foot.  POD #1               John Franekl DPM  August 22, 2018

## 2018-08-22 NOTE — PROGRESS NOTES
Assumed patient care. Received patient awake, alert, oriented X4. Patient denies pain at this time. Dressing on Right foot dry, intact, reminded patient No Wt. Bearing, verbalized understanding. Bed is locked and in lowest position and call bell is within reach. Not in acute distress.

## 2018-08-22 NOTE — OP NOTES
[address] REPORT    Garrett Guerin  MR#: [id number]  : 1985  ACCOUNT #: [de-identified]   DATE OF SERVICE: 2018    PREOPERATIVE DIAGNOSIS:  Diabetic foot infection of necrotizing fasciitis and osteomyelitis, status post 5th digit amputation. POSTOPERATIVE DIAGNOSIS:  Diabetic foot infection of necrotizing fasciitis and osteomyelitis, status post 5th digit amputation. PROCEDURE PERFORMED:  Extensive excisional debridement of soft tissue and bone of the 5th metatarsal with lavage, right foot. SURGEON:  TOMÁS Stoll MD    ANESTHESIA:  MAC with sural nerve block right. ESTIMATED BLOOD LOSS:  minimal.     HEMOSTASIS:  No tourniquet utilized. MATERIAL:  3 L Pulsavac lavaged with gentamicin impregnated saline, #2 nylon and 2-0 nylon and #7 TLS drain. SPECIMENS REMOVED:  5th metatarsal.     COMPLICATIONS:  None. INDICATIONS:  The patient is a 20-year-old diabetic male who presented to the ER last week complaining of swelling and ulcer on the right foot. The patient underwent extensive soft tissue debridement with lavage and amputation of the 5th digit on 2018. The patient has received local wound care and IV antibiotics and he would benefit from further surgical intervention at this time. DESCRIPTION OF PROCEDURE:  The patient was brought to the operating room and placed in the supine position. At this time, a timeout was performed. He received IV sedation along with local infiltration utilizing a total of 10 mL of 0.5% Marcaine plain via sural nerve block, right foot. The left foot was then scrubbed, prepped, and draped in normal aseptic manner. A second timeout was performed, laterality was confirmed. At the time, attention was then directed to the previous operative site with a #15 blade.   The skin edges were freshened as well as excisional debridement of all remaining necrotic tissue, exposing the shaft of the 5th metatarsal.  With the TPS saw, the distal 5th metatarsal was resected to the level of the base, making sure to leave attachment site for the peroneus brevis tendon. There was a noted abscess tracking plantar medial under the 4th and 3rd metatarsal shaft, which was evacuated. The area was then Pulsavac lavage with 3 L of gentamicin impregnated saline. The area was once again inspected and was found not to have any abscesses or tracking areas noted. The area was then debulked with a #15 blade followed by 1 g of powdered vancomycin in the floor of the wound bed. The skin edges were then rotated distal medially and was noted minimal tension. With displacement of the skin edges, #2 retention sutures were then placed proximal and distal of the incision site followed by #2 nylon via simple sutures after introduction of a #7 TLS drain. The drain was then anchored dorsally and the closed incision site was then dressed with Xeroform, 4 x 4's, Kerlix, and a 4-inch Ace. Patient tolerated procedure and anesthesia well and left the OR for PACU in satisfactory condition.       Tad Land DPM dictating on behalf of ALAN Alvarez / Brittany Bunn  D: 08/22/2018 06:25     T: 08/22/2018 10:48  JOB #: [id number]

## 2018-08-22 NOTE — ANESTHESIA POSTPROCEDURE EVALUATION
Post-Anesthesia Evaluation and Assessment    Patient: Aubrie Munoz MRN: [id number]  SSN: [id number]    YOB: 1985  Age: 26 y.o. Sex: male       Cardiovascular Function/Vital Signs  Visit Vitals    /81 (BP 1 Location: Left arm, BP Patient Position: At rest)    Pulse 84    Temp 36.7 °C (98.1 °F)    Resp 16    Ht 6' 3\" (1.905 m)    Wt 75.9 kg (167 lb 6.4 oz)    SpO2 99%    BMI 20.92 kg/m2       Patient is status post MAC anesthesia for Procedure(s):  extensive EXCISIONAL DEBRIDEMENT of soft tissue and bone of fifth metartarsal with lavage right foot. Nausea/Vomiting: None    Postoperative hydration reviewed and adequate. Pain:  Pain Scale 1: Numeric (0 - 10) (08/22/18 0130)  Pain Intensity 1: 0 (08/22/18 0130)   Managed    Neurological Status:   Neuro (WDL): Within Defined Limits (08/21/18 1001)  Neuro  Neurologic State: Alert (08/21/18 2030)  Orientation Level: Oriented X4 (08/21/18 2030)  Cognition: Appropriate safety awareness; Follows commands (08/21/18 2030)  LUE Motor Response: Purposeful (08/21/18 2030)  LLE Motor Response: Purposeful (08/21/18 2030)  RUE Motor Response: Purposeful (08/21/18 2030)  RLE Motor Response: Purposeful (08/21/18 2030)   At baseline    Mental Status and Level of Consciousness: Alert and oriented     Pulmonary Status:   O2 Device: Room air (08/21/18 2030)   Adequate oxygenation and airway patent    Complications related to anesthesia: None    Post-anesthesia assessment completed.  No concerns    Signed By: Kaitlin Sky MD     August 22, 2018

## 2018-08-22 NOTE — PROGRESS NOTES
Bedside and Verbal shift change report given to Smita GORDON RN (oncoming nurse) by Sathish Kennedy RN (offgoing nurse). Report included the following information SBAR, Kardex, Intake/Output, MAR and Recent Results. Patient is non tele, resting in bed. 2030  Shift assessment completed, reports pain 5/10. PRN Dilaudid given. 2120  Pain reassessed - 1/10 reported. 8069  Medicated for pain rated 4/10. Pt in bed watching TV.    0130  Pain reassessed - Pt in bed watching TV, denies pain. 0440  Medicated for pain 5/10. Vacuum tube for right foot replaced. Pt in bed watching TV.    0530  Pain reassessed - pt asleep. Bedside and Verbal shift change report given to Geoffrey Nazairo RN (oncoming nurse) by Saundra Apodaca RN (offgoing nurse). Report included the following information SBAR, Kardex, Intake/Output and MAR. Pt in bed resting.

## 2018-08-22 NOTE — PROGRESS NOTES
INFECTIOUS DISEASE FOLLOW UP NOTE :-     Admit Date: 8/16/2018    Current  Prior    Ceftriaxone/po metronidazole 8/22-0  Vancomycin 8/16- 4   zosyn 8/16-6     ASSESSMENT: -> RECS     Necrotizing Fasciitis R foot, R 5th toe OM  - s/p I&D, right 5th toe amputation 8/17, wd culture polymicrobial so far Citrobacter, Proteus GAS, viridans Strep, NEW Prevotella anaerobic growth  -PATH gangrene OM with inflammation extending to margin of resection   -8/21 Dr. Lola Elam I+D soft tissue and bone 5th MT - plantar abscess found ->change zosyn to ceftriaxone 2 g iv daily plus po metronidazole.   ->await path from further resection 8/21, concern prior margin not clear       SIRS/sepsis  - Leukocytosis, tachycardia present on admission  - due to above  - improving -> leukocytosis resolved. DM I  - on insulin x 20 years  -NOT controlled, A1 C 11 on admission ->need good BS control to help heal infection-per IM    H/o prior toe amputations  - L 3rd toe 2017, R 3rd toe 4/18/2018      MICROBIOLOGY:   8/16 Blcx x 2 - ng  8/17 Wound cx - Citrobacter koseri [ pan S ], Proteus mirabilis [ Pan S ], GAS, strep viridans    Darcie Prevotella bivia    LINES AND CATHETERS:   PIV    SUBJECTIVE :     Interval notes reviewed. D/w pt no resistant GNR to indicate need to continue zosyn, simplify to Ceftriaxone (among preferred isolates for C koseri per [facility]) plus po metronidazole for newly reported Prevotella. Denied rash fever chill n/v/d.  Pod plan to change wd dressing tomorrow noted, understand plantar abscess tracked medially     MEDICATIONS :     Current Facility-Administered Medications   Medication Dose Route Frequency    insulin glargine (LANTUS) injection 11 Units  11 Units SubCUTAneous Q12H    insulin lispro (HUMALOG) injection   SubCUTAneous AC&HS    piperacillin-tazobactam (ZOSYN) 3.375 g in  ml ##EXTENDED 4HRS INFUSION 3.375 g IntraVENous Q8H    oxyCODONE IR (ROXICODONE) tablet 5-10 mg  5-10 mg Oral Q4H PRN    HYDROmorphone (DILAUDID) syringe 1 mg  1 mg IntraVENous Q4H PRN    sodium chloride (NS) flush 5-10 mL  5-10 mL IntraVENous PRN    glucose chewable tablet 16 g  4 Tab Oral PRN    glucagon (GLUCAGEN) injection 1 mg  1 mg IntraMUSCular PRN    dextrose (D50W) injection syrg 12.5-25 g  25-50 mL IntraVENous PRN       OBJECTIVE :     Visit Vitals    /74    Pulse 83    Temp 98.6 °F (37 °C)    Resp 16    Ht 6' 3\" (1.905 m)    Wt 72.5 kg (159 lb 13.3 oz)    SpO2 97%    BMI 19.98 kg/m2       Temp (24hrs), Av.4 °F (36.9 °C), Min:97.9 °F (36.6 °C), Max:98.8 °F (37.1 °C)    GEN - WDWN BM sitting up in bed NAD  HEENT: no thrush moist oral mucosa  RESP- no rales rhonchi or wheeze  CVS- S1'S2' no murmurs   ABD-soft, NT, bs present   EXT-rt foot wrapped in post op dressing hemovac with scant fluid   SKIN -no rash   NEURO - awake, alert, cooperative     Labs: Results:   Chemistry Recent Labs      18   0355   GLU  156*   NA  138   K  4.6   CL  100   CO2  32   BUN  6*   CREA  0.87   CA  8.6   AGAP  6   BUCR  7*      CBC w/Diff Recent Labs      18   0410   WBC  10.4   RBC  3.51*   HGB  9.7*   HCT  28.7*   PLT  502*        Path     TOE, RIGHT 5TH, AMPUTATION:   GANGRENE WITH ACUTE OSTEOMYELITIS. ACUTE INFLAMMATION EXTENDS TO RESECTION MARGIN. RADIOLOGY :    Xray foot  - IMPRESSION:  1. Recent postoperative changes involving the right foot fifth ray as above. Xray rt foot  - IMPRESSION: Soft tissue swelling and subcutaneous gas along the fifth digit. Widening of the proximal interphalangeal joint space and adjacent mild cortical  irregularity. Findings suspicious for early osteomyelitis.     Cory Harding MD  2018  [facility] AT THE [facility]  [phone]

## 2018-08-22 NOTE — PROGRESS NOTES
Problem: Falls - Risk of  Goal: *Absence of Falls  Document Baljinder Fall Risk and appropriate interventions in the flowsheet. Outcome: Progressing Towards Goal  Fall Risk Interventions:  Mobility Interventions: Patient to call before getting OOB         Medication Interventions: Evaluate medications/consider consulting pharmacy, Teach patient to arise slowly    Elimination Interventions: Call light in reach, Patient to call for help with toileting needs, Toileting schedule/hourly rounds             Problem: Pressure Injury - Risk of  Goal: *Prevention of pressure injury  Document Jonathan Scale and appropriate interventions in the flowsheet.    Outcome: Progressing Towards Goal  Pressure Injury Interventions:  Sensory Interventions: Assess changes in LOC, Check visual cues for pain, Discuss PT/OT consult with provider    Moisture Interventions: Maintain skin hydration (lotion/cream)    Activity Interventions: Pressure redistribution bed/mattress(bed type), PT/OT evaluation    Mobility Interventions: HOB 30 degrees or less, Pressure redistribution bed/mattress (bed type), PT/OT evaluation    Nutrition Interventions: Document food/fluid/supplement intake

## 2018-08-23 VITALS
SYSTOLIC BLOOD PRESSURE: 116 MMHG | BODY MASS INDEX: 19.65 KG/M2 | DIASTOLIC BLOOD PRESSURE: 75 MMHG | HEART RATE: 91 BPM | TEMPERATURE: 98.6 F | RESPIRATION RATE: 18 BRPM | WEIGHT: 158 LBS | OXYGEN SATURATION: 100 % | HEIGHT: 75 IN

## 2018-08-23 LAB
ANION GAP SERPL CALC-SCNC: 6 MMOL/L (ref 3–18)
BUN SERPL-MCNC: 16 MG/DL (ref 7–18)
BUN/CREAT SERPL: 15 (ref 12–20)
CALCIUM SERPL-MCNC: 9.5 MG/DL (ref 8.5–10.1)
CHLORIDE SERPL-SCNC: 92 MMOL/L (ref 100–108)
CO2 SERPL-SCNC: 30 MMOL/L (ref 21–32)
CREAT SERPL-MCNC: 1.06 MG/DL (ref 0.6–1.3)
GLUCOSE BLD STRIP.AUTO-MCNC: 330 MG/DL (ref 70–110)
GLUCOSE BLD STRIP.AUTO-MCNC: 338 MG/DL (ref 70–110)
GLUCOSE BLD STRIP.AUTO-MCNC: 351 MG/DL (ref 70–110)
GLUCOSE SERPL-MCNC: 374 MG/DL (ref 74–99)
POTASSIUM SERPL-SCNC: 5.4 MMOL/L (ref 3.5–5.5)
SODIUM SERPL-SCNC: 128 MMOL/L (ref 136–145)

## 2018-08-23 PROCEDURE — 80048 BASIC METABOLIC PNL TOTAL CA: CPT | Performed by: HOSPITALIST

## 2018-08-23 PROCEDURE — 74011250637 HC RX REV CODE- 250/637: Performed by: HOSPITALIST

## 2018-08-23 PROCEDURE — 74011250636 HC RX REV CODE- 250/636: Performed by: NURSE PRACTITIONER

## 2018-08-23 PROCEDURE — 74011636637 HC RX REV CODE- 636/637: Performed by: HOSPITALIST

## 2018-08-23 PROCEDURE — 74011636637 HC RX REV CODE- 636/637: Performed by: NURSE PRACTITIONER

## 2018-08-23 PROCEDURE — 74011250637 HC RX REV CODE- 250/637: Performed by: INTERNAL MEDICINE

## 2018-08-23 PROCEDURE — 36415 COLL VENOUS BLD VENIPUNCTURE: CPT | Performed by: HOSPITALIST

## 2018-08-23 PROCEDURE — 74011250636 HC RX REV CODE- 250/636: Performed by: HOSPITALIST

## 2018-08-23 PROCEDURE — 82962 GLUCOSE BLOOD TEST: CPT

## 2018-08-23 RX ORDER — INSULIN GLARGINE 100 [IU]/ML
16 INJECTION, SOLUTION SUBCUTANEOUS EVERY 12 HOURS
Status: DISCONTINUED | OUTPATIENT
Start: 2018-08-23 | End: 2018-08-23 | Stop reason: HOSPADM

## 2018-08-23 RX ORDER — INSULIN LISPRO 100 [IU]/ML
4 INJECTION, SOLUTION INTRAVENOUS; SUBCUTANEOUS
Status: DISCONTINUED | OUTPATIENT
Start: 2018-08-23 | End: 2018-08-23 | Stop reason: HOSPADM

## 2018-08-23 RX ORDER — SODIUM CHLORIDE 9 MG/ML
1000 INJECTION, SOLUTION INTRAVENOUS ONCE
Status: COMPLETED | OUTPATIENT
Start: 2018-08-23 | End: 2018-08-23

## 2018-08-23 RX ORDER — OXYCODONE HYDROCHLORIDE 5 MG/1
5-10 TABLET ORAL
Qty: 36 TAB | Refills: 0 | Status: SHIPPED | OUTPATIENT
Start: 2018-08-23 | End: 2019-04-09

## 2018-08-23 RX ORDER — AMOXICILLIN AND CLAVULANATE POTASSIUM 875; 125 MG/1; MG/1
1 TABLET, FILM COATED ORAL 2 TIMES DAILY WITH MEALS
Qty: 14 TAB | Refills: 0 | Status: SHIPPED | OUTPATIENT
Start: 2018-08-23 | End: 2018-08-30

## 2018-08-23 RX ORDER — HYDROMORPHONE HYDROCHLORIDE 1 MG/ML
0.5 INJECTION, SOLUTION INTRAMUSCULAR; INTRAVENOUS; SUBCUTANEOUS
Status: DISCONTINUED | OUTPATIENT
Start: 2018-08-23 | End: 2018-08-23 | Stop reason: HOSPADM

## 2018-08-23 RX ORDER — AMOXICILLIN AND CLAVULANATE POTASSIUM 875; 125 MG/1; MG/1
1 TABLET, FILM COATED ORAL 2 TIMES DAILY WITH MEALS
Status: DISCONTINUED | OUTPATIENT
Start: 2018-08-23 | End: 2018-08-23 | Stop reason: HOSPADM

## 2018-08-23 RX ORDER — INSULIN GLARGINE 100 [IU]/ML
12 INJECTION, SOLUTION SUBCUTANEOUS 2 TIMES DAILY
Qty: 1 VIAL | Refills: 0 | Status: SHIPPED | OUTPATIENT
Start: 2018-08-23 | End: 2018-09-18 | Stop reason: SDUPTHER

## 2018-08-23 RX ORDER — INSULIN GLARGINE 100 [IU]/ML
5 INJECTION, SOLUTION SUBCUTANEOUS ONCE
Status: COMPLETED | OUTPATIENT
Start: 2018-08-23 | End: 2018-08-23

## 2018-08-23 RX ORDER — MUPIROCIN 20 MG/G
OINTMENT TOPICAL
Qty: 22 G | Refills: 0 | Status: SHIPPED | OUTPATIENT
Start: 2018-08-23 | End: 2019-07-15 | Stop reason: ALTCHOICE

## 2018-08-23 RX ORDER — LEVOFLOXACIN 750 MG/1
750 TABLET ORAL EVERY 24 HOURS
Qty: 7 TAB | Refills: 0 | Status: SHIPPED | OUTPATIENT
Start: 2018-08-23 | End: 2018-08-30

## 2018-08-23 RX ORDER — LEVOFLOXACIN 750 MG/1
750 TABLET ORAL EVERY 24 HOURS
Status: DISCONTINUED | OUTPATIENT
Start: 2018-08-23 | End: 2018-08-23 | Stop reason: HOSPADM

## 2018-08-23 RX ADMIN — INSULIN GLARGINE 5 UNITS: 100 INJECTION, SOLUTION SUBCUTANEOUS at 12:28

## 2018-08-23 RX ADMIN — INSULIN LISPRO 12 UNITS: 100 INJECTION, SOLUTION INTRAVENOUS; SUBCUTANEOUS at 08:32

## 2018-08-23 RX ADMIN — SODIUM CHLORIDE 1000 ML: 900 INJECTION, SOLUTION INTRAVENOUS at 10:44

## 2018-08-23 RX ADMIN — HYDROMORPHONE HYDROCHLORIDE 1 MG: 1 INJECTION, SOLUTION INTRAMUSCULAR; INTRAVENOUS; SUBCUTANEOUS at 01:59

## 2018-08-23 RX ADMIN — OXYCODONE HYDROCHLORIDE 10 MG: 5 TABLET ORAL at 08:37

## 2018-08-23 RX ADMIN — METRONIDAZOLE 500 MG: 250 TABLET ORAL at 08:34

## 2018-08-23 RX ADMIN — INSULIN GLARGINE 12 UNITS: 100 INJECTION, SOLUTION SUBCUTANEOUS at 08:33

## 2018-08-23 RX ADMIN — INSULIN LISPRO 12 UNITS: 100 INJECTION, SOLUTION INTRAVENOUS; SUBCUTANEOUS at 12:27

## 2018-08-23 RX ADMIN — HYDROMORPHONE HYDROCHLORIDE 1 MG: 1 INJECTION, SOLUTION INTRAMUSCULAR; INTRAVENOUS; SUBCUTANEOUS at 06:02

## 2018-08-23 RX ADMIN — HYDROMORPHONE HYDROCHLORIDE 0.5 MG: 1 INJECTION, SOLUTION INTRAMUSCULAR; INTRAVENOUS; SUBCUTANEOUS at 12:29

## 2018-08-23 RX ADMIN — LEVOFLOXACIN 750 MG: 750 TABLET, FILM COATED ORAL at 12:29

## 2018-08-23 RX ADMIN — INSULIN LISPRO 4 UNITS: 100 INJECTION, SOLUTION INTRAVENOUS; SUBCUTANEOUS at 12:28

## 2018-08-23 NOTE — PROGRESS NOTES
Problem: Falls - Risk of  Goal: *Absence of Falls  Document Baljinder Fall Risk and appropriate interventions in the flowsheet.    Outcome: Progressing Towards Goal  Fall Risk Interventions:  Mobility Interventions: Patient to call before getting OOB         Medication Interventions: Patient to call before getting OOB, Teach patient to arise slowly    Elimination Interventions: Call light in reach, Urinal in reach, Toileting schedule/hourly rounds

## 2018-08-23 NOTE — DISCHARGE SUMMARY
[facility] [place]    [place] Summary    Patient: Jensen Ghosh MRN: [id number]  CSN: [id number]    YOB: 1985  Age: 26 y.o. Sex: male    DOA: 8/16/2018 LOS:  LOS: 7 days   Discharge Date:      Admission Diagnoses: Right foot infection   Osteomyelitis ([facility]  right foot infection, osteomyelitis    Discharge Diagnoses:    Problem List as of 8/23/2018  Date Reviewed: 8/17/2018          Codes Class Noted - Resolved    Necrotizing fasciitis due to microorganism [facility]) ICD-10-CM: M72.6  ICD-9-CM: 728.86  8/19/2018 - Present        Gangrene of toe ([facility] ICD-10-CM: M95  ICD-9-CM: 785.4  8/19/2018 - Present        * (Principal)Osteomyelitis ([facility] ICD-10-CM: M86.9  ICD-9-CM: 730.20  8/16/2018 - Present        Type 1 diabetes mellitus without complication ([facility] YRS-76-CH: E10.9  ICD-9-CM: 250.01  5/31/2018 - Present              Discharge Condition: Stable    Discharge To: Home    Consults: Hospitalist and [facility] Course: 25 y/o male with hx of DM I on insulin x 20 years with prior hx of amputations of left 3rd toe (2017) and right 3rd toe (4/18/2018), admitted to [facility] on 8/16 due to right foot gangrene. Two weeks PTA pt began noticing pain in his right 5th toe and it started to look white. He had been walking in the rain and through flooded areas with sneakers on. Pain worsened and developed purulent foul smelling drainage, leading him to present to the ED for further work up. Per EMR, noted to have gangrenous, black 5th right toe with dorsal erythema. Right foot xray showed soft tissue swelling and subcutaneous gas along 5th digit w/ widening of proximal interphalangeal joint space and adjacent mild cortical irregularity suspicious for early osteomyelitis. WBC of 32.5 on admission, A1C 11%, blood cultures collected 8/16 NGTD x 2. He was admitted on empiric Vancomycin and Zosyn. Podiatry and ID consulted.   Pt taken to the OR by podiatry on 8/17  for I&D and right 5th toe amputation, per notes, found gray, dishwater pus planing through tissue,putrid malodor, gangrenous fifth toe. Wound culture with moderate Citrobacter Koseri, moderate proteus mirabilis 2 morphotypes same susceptibility, moderate streptococci beta hemolytic group A, many streptococcus viridans, anaerobic culture in progress. ID managing abx. WBC improved. S/p extensive excisional debridement of soft tissue and bone of fifth metartarsal w/ lavage right foot by Dr. Vishnu Horton on 8/21- she did verify obtained clean margins during this procedure. First dressing change by Dr. Vishnu Horton on 8/23. Recommend CAM boot RLE, NWB RLE at all times with crutches, Bactroban every 2-3 days to incision. Lantus adjusted based on glucose. Labs and vital signs WNL. He is appropriate for d/c home, to follow up with PCP within 3-5 days and with podiatry within 3-7 days. Pt will take oral Augmentin x 14 doses and oral Levaquin x 7 doses. Physical Exam:  General appearance: alert, cooperative, no distress, appears stated age  Head: Normocephalic, without obvious abnormality, atraumatic  Lungs: clear to auscultation bilaterally  Heart: regular rate and rhythm, S1, S2 normal, no murmur, click, rub or gallop  Abdomen: soft, non tender, non distended. Normoactive bowel sounds.   Extremities: ACE bandage to right foot with small amount of dried bloody drainage, drain in place, right 5th toe amputation, right 3rd toe amputation  Skin: Skin color, texture, turgor normal. No rashes or lesions  Neurologic: Grossly normal  PSY: Mood and affect normal, appropriately behaved    Significant Diagnostic Studies: labs:   Recent Results (from the past 24 hour(s))   GLUCOSE, POC    Collection Time: 08/22/18 12:22 PM   Result Value Ref Range    Glucose (POC) 266 (H) 70 - 110 mg/dL   GLUCOSE, POC    Collection Time: 08/22/18  4:36 PM   Result Value Ref Range    Glucose (POC) 264 (H) 70 - 110 mg/dL   GLUCOSE, POC    Collection Time: 08/22/18  9:46 PM   Result Value Ref Range Glucose (POC) 285 (H) 70 - 110 mg/dL   GLUCOSE, POC    Collection Time: 08/23/18  6:23 AM   Result Value Ref Range    Glucose (POC) 351 (H) 70 - 110 mg/dL   GLUCOSE, POC    Collection Time: 08/23/18  7:28 AM   Result Value Ref Range    Glucose (POC) 338 (H) 70 - 127 mg/dL   METABOLIC PANEL, BASIC    Collection Time: 08/23/18  8:27 AM   Result Value Ref Range    Sodium 128 (L) 136 - 145 mmol/L    Potassium 5.4 3.5 - 5.5 mmol/L    Chloride 92 (L) 100 - 108 mmol/L    CO2 30 21 - 32 mmol/L    Anion gap 6 3.0 - 18 mmol/L    Glucose 374 (H) 74 - 99 mg/dL    BUN 16 7.0 - 18 MG/DL    Creatinine 1.06 0.6 - 1.3 MG/DL    BUN/Creatinine ratio 15 12 - 20      GFR est AA >60 >60 ml/min/1.73m2    GFR est non-AA >60 >60 ml/min/1.73m2    Calcium 9.5 8.5 - 10.1 MG/DL         Discharge Medications:     Current Discharge Medication List      START taking these medications    Details   insulin glargine (LANTUS) 100 unit/mL injection 12 Units by SubCUTAneous route two (2) times a day. Qty: 1 Vial, Refills: 0      oxyCODONE IR (ROXICODONE) 5 mg immediate release tablet Take 1-2 Tabs by mouth every four (4) hours as needed. Max Daily Amount: 60 mg.  Qty: 36 Tab, Refills: 0    Associated Diagnoses: Gangrene of toe of right foot (HCC)      mupirocin (BACTROBAN) 2 % ointment Apply  to affected area every fourty-eight (48) hours. Apply to incision every 2-3 days. Qty: 22 g, Refills: 0      amoxicillin-clavulanate (AUGMENTIN) 875-125 mg per tablet Take 1 Tab by mouth two (2) times daily (with meals) for 14 doses. Qty: 14 Tab, Refills: 0      levoFLOXacin (LEVAQUIN) 750 mg tablet Take 1 Tab by mouth every twenty-four (24) hours for 7 doses. Qty: 7 Tab, Refills: 0         CONTINUE these medications which have NOT CHANGED    Details   insulin regular, human (NOVOLIN R INJECTION) by Injection route. insulin NPH (NOVOLIN N, HUMULIN N) 100 unit/mL injection by SubCUTAneous route once.              Activity: Activity as tolerated- Cam boot RLE, NWB RLE at all times with crutches    Diet: Diabetic Diet    Wound Care: ACE bandage, apply Bactroban every 2-3 days to incision site. Keep dry. Follow-up: Follow up with PCP, [address] [place] [address] within 3-5 days. Follow up with podiatry within 3-7 days.     Discharge time: > 35 mins  Lokesh Alston NP  8/23/2018, 9:39 AM

## 2018-08-23 NOTE — PROGRESS NOTES
INFECTIOUS DISEASE FOLLOW UP NOTE :-     Admit Date: 8/16/2018    Current  Prior    Levaquin/augmentin po 8/23-0 Vancomycin 8/16- 4   zosyn 8/16-6 Ceftriaxone/po metronidazole 8/22-1     ASSESSMENT: -> RECS     Necrotizing Fasciitis R foot, R 5th toe OM  - s/p I&D, right 5th toe amputation 8/17, wd culture polymicrobial with Citrobacter, Proteus GAS, viridans Strep, Prevotella anaerobic growth  -PATH gangrene OM with inflammation extending to margin of resection   -8/21 Dr. Xavi Blunt I+D soft tissue and bone 5th MT - plantar abscess found -IM d/w Dr. Julio Lee today, understand no residual infected bone after RTOR and ready for discharge with podiatry  ->change abx to levaquin for citrobacter (K+ up today so not bactrim) PLUS augmentin for Gp A strep, Proteus S amp and anaerobes  -d/w Jeanette La today, understand to f/u with podiatry within 1 week so we will plan to be available through office if needed    SIRS/sepsis  - Leukocytosis, tachycardia present on admission  - due to above  - improving -> leukocytosis resolved. DM I  - on insulin x 20 years  -NOT controlled, A1 C 11 on admission ->need good BS control to help heal infection-per IM    H/o prior toe amputations  - L 3rd toe 2017, R 3rd toe 4/18/2018      MICROBIOLOGY:   8/16 Blcx x 2 - ng  8/17 Wound cx - Citrobacter koseri [ pan S ], Proteus mirabilis [ Pan S ], GAS, strep viridans    Darcie Prevotella bivia    LINES AND CATHETERS:   PIV    SUBJECTIVE :     Interval notes reviewed, d/w Jeanette La today, they d/w Dr. Carleen Castro and were assured no residual infected bone now, will change abx to po augmentin and levaquin to cover isolates, to f/u with podiatry within 1 week so consider abx to then, likely stop at that point. Pt says doing well no n/v/d abd pain, K mildly up d/w IM, will not use bactrim for citrobacter as could exacerbate this, other plans per them. MEDICATIONS :     Current Facility-Administered Medications   Medication Dose Route Frequency    HYDROmorphone (DILAUDID) syringe 0.5 mg  0.5 mg IntraVENous Q6H PRN    cefTRIAXone (ROCEPHIN) 2 g in 0.9% sodium chloride (MBP/ADV) 50 mL MBP  2 g IntraVENous Q24H    metroNIDAZOLE (FLAGYL) tablet 500 mg  500 mg Oral TID    insulin glargine (LANTUS) injection 12 Units  12 Units SubCUTAneous Q12H    insulin lispro (HUMALOG) injection   SubCUTAneous AC&HS    oxyCODONE IR (ROXICODONE) tablet 5-10 mg  5-10 mg Oral Q4H PRN    sodium chloride (NS) flush 5-10 mL  5-10 mL IntraVENous PRN    glucose chewable tablet 16 g  4 Tab Oral PRN    glucagon (GLUCAGEN) injection 1 mg  1 mg IntraMUSCular PRN    dextrose (D50W) injection syrg 12.5-25 g  25-50 mL IntraVENous PRN       OBJECTIVE :     Visit Vitals    /78 (BP 1 Location: Left arm, BP Patient Position: At rest)    Pulse 88    Temp 98.2 °F (36.8 °C)    Resp 18    Ht 6' 3\" (1.905 m)    Wt 72.5 kg (159 lb 13.3 oz)    SpO2 100%    BMI 19.98 kg/m2       Temp (24hrs), Av.4 °F (36.9 °C), Min:98.2 °F (36.8 °C), Max:98.9 °F (37.2 °C)    GEN - WDWN BM sitting up in bed NAD  HEENT: no thrush moist oral mucosa  RESP- no rales rhonchi or wheeze  CVS- S1'S2' no murmurs   ABD-soft, NT, bs present   EXT-rt foot wrapped in clean dry intact dressing. SKIN -no rash   NEURO - awake, alert, cooperative     Labs: Results:   Chemistry Recent Labs      18   0827  18   0355   GLU  374*  156*   NA  128*  138   K  5.4  4.6   CL  92*  100   CO2  30  32   BUN  16  6*   CREA  1.06  0.87   CA  9.5  8.6   AGAP  6  6   BUCR  15  7*      CBC w/Diff No results for input(s): WBC, RBC, HGB, HCT, PLT, GRANS, LYMPH, EOS, HGBEXT, HCTEXT, PLTEXT, HGBEXT, HCTEXT, PLTEXT in the last 72 hours. Path     TOE, RIGHT 5TH, AMPUTATION:   GANGRENE WITH ACUTE OSTEOMYELITIS. ACUTE INFLAMMATION EXTENDS TO RESECTION MARGIN. RADIOLOGY :    Xray foot  - IMPRESSION:  1. Recent postoperative changes involving the right foot fifth ray as above. Xray rt foot 8/16 - IMPRESSION: Soft tissue swelling and subcutaneous gas along the fifth digit. Widening of the proximal interphalangeal joint space and adjacent mild cortical  irregularity. Findings suspicious for early osteomyelitis.     Evelina Sena MD  August 23, 2018  [facility] AT [facility]  [phone]

## 2018-08-23 NOTE — DISCHARGE INSTRUCTIONS
DISCHARGE SUMMARY from Nurse    PATIENT INSTRUCTIONS:    After general anesthesia or intravenous sedation, for 24 hours or while taking prescription Narcotics:  · Limit your activities  · Do not drive and operate hazardous machinery  · Do not make important personal or business decisions  · Do  not drink alcoholic beverages  · If you have not urinated within 8 hours after discharge, please contact your surgeon on call. Report the following to your surgeon:  · Excessive pain, swelling, redness or odor of or around the surgical area  · Temperature over 100.5  · Nausea and vomiting lasting longer than 4 hours or if unable to take medications  · Any signs of decreased circulation or nerve impairment to extremity: change in color, persistent  numbness, tingling, coldness or increase pain  · Any questions    What to do at Home:  Recommended activity: NonWeightBearing to right foot. Will have CAM boot placed and to continue to use crutches for NWB ambulation right foot. If you experience any of the following symptoms chest pain, nausea, vomiting, please follow up with PCP. *  Please give a list of your current medications to your Primary Care Provider. *  Please update this list whenever your medications are discontinued, doses are      changed, or new medications (including over-the-counter products) are added. *  Please carry medication information at all times in case of emergency situations. These are general instructions for a healthy lifestyle:    No smoking/ No tobacco products/ Avoid exposure to second hand smoke  Surgeon General's Warning:  Quitting smoking now greatly reduces serious risk to your health.     Obesity, smoking, and sedentary lifestyle greatly increases your risk for illness    A healthy diet, regular physical exercise & weight monitoring are important for maintaining a healthy lifestyle    You may be retaining fluid if you have a history of heart failure or if you experience any of the following symptoms:  Weight gain of 3 pounds or more overnight or 5 pounds in a week, increased swelling in our hands or feet or shortness of breath while lying flat in bed. Please call your doctor as soon as you notice any of these symptoms; do not wait until your next office visit. Recognize signs and symptoms of STROKE:    F-face looks uneven    A-arms unable to move or move unevenly    S-speech slurred or non-existent    T-time-call 911 as soon as signs and symptoms begin-DO NOT go       Back to bed or wait to see if you get better-TIME IS BRAIN. Warning Signs of HEART ATTACK     Call 911 if you have these symptoms:   Chest discomfort. Most heart attacks involve discomfort in the center of the chest that lasts more than a few minutes, or that goes away and comes back. It can feel like uncomfortable pressure, squeezing, fullness, or pain.  Discomfort in other areas of the upper body. Symptoms can include pain or discomfort in one or both arms, the back, neck, jaw, or stomach.  Shortness of breath with or without chest discomfort.  Other signs may include breaking out in a cold sweat, nausea, or lightheadedness. Don't wait more than five minutes to call 911 - MINUTES MATTER! Fast action can save your life. Calling 911 is almost always the fastest way to get lifesaving treatment. Emergency Medical Services staff can begin treatment when they arrive -- up to an hour sooner than if someone gets to the hospital by car. The discharge information has been reviewed with the patient. The patient verbalized understanding. Discharge medications reviewed with the patient and appropriate educational materials and side effects teaching were provided.   ___________________________________________________________________________________________________________________________________         Diabetes Foot Health: Care Instructions  Your Care Instructions    When you have diabetes, your feet need extra care and attention. Diabetes can damage the nerve endings and blood vessels in your feet, making you less likely to notice when your feet are injured. Diabetes also limits your body's ability to fight infection and get blood to areas that need it. If you get a minor foot injury, it could become an ulcer or a serious infection. With good foot care, you can prevent most of these problems. Caring for your feet can be quick and easy. Most of the care can be done when you are bathing or getting ready for bed. Follow-up care is a key part of your treatment and safety. Be sure to make and go to all appointments, and call your doctor if you are having problems. It's also a good idea to know your test results and keep a list of the medicines you take. How can you care for yourself at home? · Keep your blood sugar close to normal by watching what and how much you eat, monitoring blood sugar, taking medicines if prescribed, and getting regular exercise. · Do not smoke. Smoking affects blood flow and can make foot problems worse. If you need help quitting, talk to your doctor about stop-smoking programs and medicines. These can increase your chances of quitting for good. · Eat a diet that is low in fats. High fat intake can cause fat to build up in your blood vessels and decrease blood flow. · Inspect your feet daily for blisters, cuts, cracks, or sores. If you cannot see well, use a mirror or have someone help you. · Take care of your feet:  [facility] your feet every day. Use warm (not hot) water. Check the water temperature with your wrists or other part of your body, not your feet. ¨ Dry your feet well. Pat them dry. Do not rub the skin on your feet too hard. Dry well between your toes. If the skin on your feet stays moist, bacteria or a fungus can grow, which can lead to infection. ¨ Keep your skin soft. Use moisturizing skin cream to keep the skin on your feet soft and prevent calluses and cracks.  But do not put the cream between your toes, and stop using any cream that causes a rash. ¨ Clean underneath your toenails carefully. Do not use a sharp object to clean underneath your toenails. Use the blunt end of a nail file or other rounded tool. ¨ Trim and file your toenails straight across to prevent ingrown toenails. Use a nail clipper, not scissors. Use an emery board to smooth the edges. · Change socks daily. Socks without seams are best, because seams often rub the feet. You can find socks for people with diabetes from specialty catalogs. · Look inside your shoes every day for things like gravel or torn linings, which could cause blisters or sores. · Buy shoes that fit well:  ¨ Look for shoes that have plenty of space around the toes. This helps prevent bunions and blisters. ¨ Try on shoes while wearing the kind of socks you will usually wear with the shoes. ¨ Avoid plastic shoes. They may rub your feet and cause blisters. Good shoes should be made of materials that are flexible and breathable, such as leather or cloth. ¨ Break in new shoes slowly by wearing them for no more than an hour a day for several days. Take extra time to check your feet for red areas, blisters, or other problems after you wear new shoes. · Do not go barefoot. Do not wear sandals, and do not wear shoes with very thin soles. Thin soles are easy to puncture. They also do not protect your feet from hot pavement or cold weather. · Have your doctor check your feet during each visit. If you have a foot problem, see your doctor. Do not try to treat an early foot problem at home. Home remedies or treatments that you can buy without a prescription (such as corn removers) can be harmful. · Always get early treatment for foot problems. A minor irritation can lead to a major problem if not properly cared for early. When should you call for help?   Call your doctor now or seek immediate medical care if:    · You have a foot sore, an ulcer or break in the skin that is not healing after 4 days, bleeding corns or calluses, or an ingrown toenail.     · You have blue or black areas, which can mean bruising or blood flow problems.     · You have peeling skin or tiny blisters between your toes or cracking or oozing of the skin.     · You have a fever for more than 24 hours and a foot sore.     · You have new numbness or tingling in your feet that does not go away after you move your feet or change positions.     · You have unexplained or unusual swelling of the foot or ankle.    Watch closely for changes in your health, and be sure to contact your doctor if:    · You cannot do proper foot care. Where can you learn more? Go to http://london-mckenna.info/. Enter A739 in the search box to learn more about \"Diabetes Foot Health: Care Instructions. \"  Current as of: December 7, 2017  Content Version: 11.7  © 3714-5904 [employer]. Care instructions adapted under license by [employer] (which disclaims liability or warranty for this information). If you have questions about a medical condition or this instruction, always ask your healthcare professional. [place] [address] any warranty or liability for your use of this information.

## 2018-08-23 NOTE — ROUTINE PROCESS
1941 Received report from [facility] ADY L KRAKAU [facility] DIV. Patient alert and oriented. ***   ***   ***  *** {[facility] BEDSIDE_VERBAL_RECORDED_WRITTEN:[id number]::\"Bedside\"} shift change report given to *** (oncoming nurse) by *** (offgoing nurse). Report included the following information {SBAR REPORTS HILN:[id number]}.

## 2018-08-23 NOTE — PROGRESS NOTES
Podiatry Surgery Progress Note      Patient: Catracho Fuller MRN: [id number]  SSN: [id number]    YOB: 1985  Age: 26 y.o. Sex: male      Assessment:     Patient Active Problem List   Diagnosis Code    Type 1 diabetes mellitus without complication ([facility] K32.0    Osteomyelitis ([facility] M86.9    Necrotizing fasciitis due to microorganism ([facility] M72.6    Gangrene of toe ([facility] I96          Plan:   Fresh dry sterile dressing with 4\" ace wrap placed. Explained to patient he must remain NWB right foot. Will have CAM boot placed and he is to continue to use crutches for NWB ambulation right foot. Also explained to patient he must keep his blood sugars under control which will also help healing potential. Patient seems to understand. Patient from a podiatric view he is stable for discharge with [facility], antibx and incision care with Bactroban ointment Q 48-72 hrs right foot. If patient is discharged he will need a fu appointment at the wound clinic 3-7 post discharge    Total time spent with patient: [facility] [address] discussed with: Patient    Discussed:  Care Plan and home health    Disposition:  Stable      Mr. Tanvi Jeronimo is a 26 y.o. male who was seen at bedside with no new complaints today        Subjective:   Past Medical History  Past Medical History:   Diagnosis Date    Diabetes ([facility] 1998     Social History     Social History    Marital status: SINGLE     Spouse name: N/A    Number of children: N/A    Years of education: N/A     Occupational History    Not on file.      Social History Main Topics    Smoking status: Current Every Day Smoker     Packs/day: 0.50    Smokeless tobacco: Never Used    Alcohol use No    Drug use: No    Sexual activity: Not on file     Other Topics Concern    Not on file     Social History Narrative       Current Medications  Current Facility-Administered Medications   Medication Dose Route Frequency Provider Last Rate Last Dose    HYDROmorphone (DILAUDID) syringe 0.5 mg  0.5 mg IntraVENous Q6H PRN Jesusita Li DO        cefTRIAXone (ROCEPHIN) 2 g in 0.9% sodium chloride (MBP/ADV) 50 mL MBP  2 g IntraVENous Q24H VIKY Barrientos  mL/hr at 08/22/18 1359 2 g at 08/22/18 1359    metroNIDAZOLE (FLAGYL) tablet 500 mg  500 mg Oral TID VIKY Barrientos MD   500 mg at 08/22/18 2153    insulin glargine (LANTUS) injection 12 Units  12 Units SubCUTAneous Q12H Jesusita Li DO   Stopped at 08/22/18 2200    insulin lispro (HUMALOG) injection   SubCUTAneous AC&HS Bindu Lopez NP   9 Units at 08/22/18 2154    oxyCODONE IR (ROXICODONE) tablet 5-10 mg  5-10 mg Oral Q4H PRN Jesusita Li DO   10 mg at 08/18/18 1806    sodium chloride (NS) flush 5-10 mL  5-10 mL IntraVENous PRN Briana Reza MD   10 mL at 08/17/18 1658    glucose chewable tablet 16 g  4 Tab Oral PRN Devonte Monk MD        glucagon (GLUCAGEN) injection 1 mg  1 mg IntraMUSCular PRN Devonte Monk MD        dextrose (D50W) injection syrg 12.5-25 g  25-50 mL IntraVENous PRN Devonte Monk MD           Patient Allergies  No Known Allergies       Objective:   General Exam  alert, cooperative, no distress, appears stated age    Vitals  Visit Vitals    /71 (BP 1 Location: Left arm, BP Patient Position: At rest)    Pulse 83    Temp 98.2 °F (36.8 °C)    Resp 16    Ht 6' 3\" (1.905 m)    Wt 72.5 kg (159 lb 13.3 oz)    SpO2 100%    BMI 19.98 kg/m2       REVIEW OF SYSTEMS:  General: denies chronic fatigue, weight loss, fever, anemia, bruising, depression, nervousness, panic attacks  HEENT: denies ringing in ears, ear infections, dizzy spells, poor vision, glaucoma, sinus trouble, hoarseness, eye infections  GI: denies diarrhea, gas, bloating, heartburn, regurgitation, difficulty swallowing, painful swallowing, nausea, vomiting, constipation, abdominal pain, decreased appetite, blood in stools, black stools, jaundice, dark urine  Lungs: denies pneumonia, asthma, cough, SOB, hemoptysis  Heart: denies chest pain, irregular heart beat, ankle swelling, HTN  Skin: denies rashes, hives, allergic reaction  Urinary: denies UTI, kidney stones, decreased urine force and flow, urination at night, blood in urine, painful urination  Bones and Joints: denies arthritis, rheumatism, back pain, gout, osteoporosis  Neurologic: denies stroke, seizures, headaches, numbness, tingling    Foot Exam    Dressing is intact with unchanged in strike through bleeding noted lateral dorsal right foot. Vascular: Dorsalis Pedis 2/4 and Posterior Tibial 2/4 Bilaterally. Pedal hair is decreased. There  are not varicosities present. Pedal edema is mild right foot. CFT to the remaining digits right foot 3 secs.      Neurological:  Light touch protective sensation is decreased to both feet.  There are non reproducible paresthesias to bilateral lower extremities.  There are no Tinel's or 's signs present to the nerves crossing the ankle joint.      Orthopedic:  Gross abnormalities present with multiple digital amputations. Manual muscle testing is 5/5 right and 5/5 left for all remaining groups traversing the ankles.       Dermatological: Incision site is intact with sutures noted and small area of gapping noted at the distal aspect of the incision site near the 4th digit. TLS drain in place with no less than 0.1cc noted    Wound Foot Right (Active)   DRESSING STATUS Clean, dry, and intact 8/21/2018 10:01 AM   DRESSING TYPE Elastic bandage 8/18/2018  8:00 PM   Incision site well approximated? No 8/17/2018  9:00 AM   Tissue Type Black 8/16/2018  4:00 AM   Drainage Amount  None 8/17/2018  8:00 PM   Drainage Color Serous 8/16/2018  4:00 AM   Wound Odor None 8/18/2018  8:00 PM   Number of days:6       Wound Foot Right;Lateral (Active)   DRESSING STATUS Clean, dry, and intact 8/21/2018 10:01 AM   DRESSING TYPE 4 x 4;ABD pad;Sutures; Xeroform 8/21/2018  8:53 AM   Incision site well approximated?  Yes 8/21/2018 8:53 AM   Number of days:2       [REMOVED] Wound Foot Right (Removed)   Removed 08/17/18 1401   DRESSING STATUS Moist;New drainage; Old drainage 8/17/2018  8:10 AM   DRESSING TYPE Open to air 8/17/2018  8:10 AM   Non-Pressure Injury Partial thickness (epider/derm) 8/17/2018  8:10 AM   Tissue Type Black 8/17/2018  8:10 AM   Drainage Amount  Small  8/17/2018  8:10 AM   Drainage Color Black 8/17/2018  8:10 AM   Wound Odor Foul 8/17/2018  8:10 AM   Periwound Skin Condition Edematous 8/17/2018  8:10 AM   Number of days:0        Labs  Recent Results (from the past 24 hour(s))   GLUCOSE, POC    Collection Time: 08/22/18 12:22 PM   Result Value Ref Range    Glucose (POC) 266 (H) 70 - 110 mg/dL   GLUCOSE, POC    Collection Time: 08/22/18  4:36 PM   Result Value Ref Range    Glucose (POC) 264 (H) 70 - 110 mg/dL   GLUCOSE, POC    Collection Time: 08/22/18  9:46 PM   Result Value Ref Range    Glucose (POC) 285 (H) 70 - 110 mg/dL   GLUCOSE, POC    Collection Time: 08/23/18  6:23 AM   Result Value Ref Range    Glucose (POC) 351 (H) 70 - 110 mg/dL   GLUCOSE, POC    Collection Time: 08/23/18  7:28 AM   Result Value Ref Range    Glucose (POC) 338 (H) 70 - 110 mg/dL       X-Ray:  reviewed    Procedures: Extensive excisional debridement of soft tissue and partial resection of the 5th metatarsal with lavage right foot.  POD #2                Minor Junes, DPM  August 23, 2018

## 2018-08-23 NOTE — ROUTINE PROCESS
Bedside and Verbal shift change report given to Syeda Hannah RN (oshncoming nurse) by Aretha Obando RN (offgoing nurse). Report included the folowing information SBAR, Kardex, Intake/Output, MAR and Recent Results.

## 2018-08-23 NOTE — PROGRESS NOTES
2000: Assumed pt care. Received pt resting in bed, pt is alert and oriented x 4. No signs of distress. With wound dressing on right foot, clean, dry and intact, with TLS drain, with very small amount of drainage. Bed on lowest position, wheels locked, call bell within reach.    8-23-18    0720: Bedside and Verbal shift change report given to Mamta Cadet (oncoming nurse) by Ilan Syed   (offgoing nurse). Report included the following information SBAR, Kardex, Intake/Output, MAR and Recent Results.

## 2018-08-23 NOTE — PROGRESS NOTES
Chart reviewed. Noted orders for discharge. Provided pt with med assist for Augmentin,Levaquin, & Bactroban oint. Met with pt & made him aware of above, asked him how he was getting home, states he is calling lyft. Available as needed. Manisha WagnerRN,ext 2011.       Care Management Interventions  PCP Verified by CM: No  Palliative Care Criteria Met (RRAT>21 & CHF Dx)?: No  Mode of Transport at Discharge: Self  Transition of Care Consult (CM Consult): Discharge Planning  MyChart Signup: No  Discharge Durable Medical Equipment: No  Physical Therapy Consult: Yes  Occupational Therapy Consult: No  Speech Therapy Consult: No  Current Support Network: Lives Alone  Confirm Follow Up Transport: Self  Plan discussed with Pt/Family/Caregiver: Yes   Resource Information Provided?: No  Discharge Location  Discharge Placement: Home

## 2018-08-24 ENCOUNTER — PATIENT OUTREACH (OUTPATIENT)
Dept: FAMILY MEDICINE CLINIC | Age: 33
End: 2018-08-24

## 2018-08-24 NOTE — PROGRESS NOTES
Hospital Discharge Follow-Up      Date/Time:  2018 12:04 PM    Patient was admitted to [facility] on 18 and discharged on 18 for Osteomyelitis. The physician discharge summary was available at the time of outreach. Patient was contacted within 1 business days of discharge. Top Challenges reviewed with the provider   Pt agreed to CM. Method of communication with provider :staff message    Inpatient RRAT score: not given  Was this a readmission? no     Nurse Navigator (NN) contacted the patient by telephone to perform post hospital discharge assessment. Verified name and  with patient as identifiers. Provided introduction to self, and explanation of the Nurse Navigator role. Patient reports he is doing well. Reports pain to right foot 4/10 with pain medication. Denies any s/s of fever. Pt does not have a thermometer at home. Pt has good understanding of care of his foot. Was given dressing supplies upon discharge. Patient has contact info for Podiatrist to call and schedule an appt. for next week. Patient was given application to apply for financial assistance with [employer] but has not completed the application yet. Patient agreed to case management for uncontrolled diabetes. Patient has a glucometer at home. Was only monitoring BG once daily but states now he will monitor 2-3 times daily. Patient diagnosed with diabetes at age of 11. Patient states he has no real support system. His sister lives in [place] but he states he would not ask her for any help. His sister is his emergency contact. 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 Pt plans to go back to work the first week of September. He recently moved out of the [employer] into his own apt. States he plans to cook for himself as well at eat premade foods. Pt cooks foods such as chicken and rice, potatoes,corn. Patient has not attended Diabetes class as recommended by PCP.     Patient has been approved to receive Lantus and Humalog through to the Pharmacy assistance program but no shipment has been received. Will contact PAP coordinator to check status. Follow up with patient next week. Reviewed discharge instructions and red flags with patient who verbalized understanding. Patient given an opportunity to ask questions and does not have any further questions or concerns at this time. The patient agrees to contact the PCP office for questions related to their healthcare. NN provided contact information for future reference. Disease Specific:   N/A    Summary of patient's top problems:  1. Uncontrolled DM  2. Osteomyelitis  3. Surgical wound to right foot    Home Health orders at discharge: patient refused    Durable Medical Equipment received: CAM boot, crutches    Barriers to care? financial, level of motivation, medication management, PCP relationship, support system    Advance Care Planning:   Does patient have an Advance Directive:  not on file     Medication(s):   New Medications at Discharge: Roxicodone, Bactroban, Levaquin, Augmentin  Changed Medications at Discharge: Lantus 12 units BID  Discontinued Medications at Discharge: none    Medication reconciliation was performed with patient, who verbalizes understanding of administration of home medications. There were no barriers to obtaining medications identified at this time. Referral to Pharm D needed: no     Current Outpatient Prescriptions   Medication Sig    oxyCODONE IR (ROXICODONE) 5 mg immediate release tablet Take 1-2 Tabs by mouth every four (4) hours as needed. Max Daily Amount: 60 mg.    insulin regular, human (NOVOLIN R INJECTION) by Injection route.  insulin glargine (LANTUS) 100 unit/mL injection 12 Units by SubCUTAneous route two (2) times a day.  mupirocin (BACTROBAN) 2 % ointment Apply  to affected area every fourty-eight (48) hours. Apply to incision every 2-3 days.     amoxicillin-clavulanate (AUGMENTIN) 875-125 mg per tablet Take 1 Tab by mouth two (2) times daily (with meals) for 14 doses.  levoFLOXacin (LEVAQUIN) 750 mg tablet Take 1 Tab by mouth every twenty-four (24) hours for 7 doses.  insulin NPH (NOVOLIN N, HUMULIN N) 100 unit/mL injection by SubCUTAneous route once. No current facility-administered medications for this visit. There are no discontinued medications. [facility] follow up appointment(s):   Future Appointments  Date Time Provider Sofie Jhaveri   8/30/2018 1:45 PM MD Marco A Almazan Attends follow-up appointments as directed.  Knowledge and adherence of prescribed medication (ie. action, side effects, missed dose, etc.).  Understands and monitors blood sugar levels      Understands red flags post discharge.

## 2018-09-07 ENCOUNTER — PATIENT OUTREACH (OUTPATIENT)
Dept: FAMILY MEDICINE CLINIC | Age: 33
End: 2018-09-07

## 2018-09-07 NOTE — TELEPHONE ENCOUNTER
Received Humalog x4 vials from [facility] patient assistance program. Order routed to provider and letter mailed to patient for .

## 2018-09-07 NOTE — LETTER
9/12/2018 12:58 PM 
 
Mr. Marialuisa Smith 
[address] [facility] Dear Marialuisa Smith I am the Nurse Navigator working with the [address] [place] [address]. I have been unable to reach you by phone. I would like to follow up with you about your Diabetes when you have some time. You can reach me directly at [phone]. Thank you for allowing us to participate in your care!  
 
 
 
 
Sincerely, 
 
 
Diogo Kumar RN

## 2018-09-07 NOTE — LETTER
9/7/2018 12:17 PM 
 
Mr. Elena Friedman 
[address] [facility] Thank you for participating in the [facility] Patient Assistance Program. 
 
This is notification that your item(s) was delivered to us. Please  your item(s) between 11:00 am and 1:00 pm, at one of our [place] sites as soon as possible When you arrive, you will need to register inside as a \"nurse visit\" to  your medication. Notify the registrar that you are there to  medication and they will register you as soon as possible. There may be a short wait but we try to make the process as fast as possible. It is important to see you regularly to make sure your insulin is at the correct dosage. If it has been more than 3 months since you saw the doctor, please come early and plan to stay for an office visit on the day you  your medicine. If you fail to follow-up for regular appointments, the [facility] may discontinue providing your medication. To see when the [address] will be in your neighborhood, go to 
[email or web address] 
or call [phone], select your language (1 for english or 2 for Brazilian), and then option 2. Sincerely, Ismael Saldana MD

## 2018-09-08 RX ORDER — INSULIN LISPRO 100 [IU]/ML
INJECTION, SOLUTION INTRAVENOUS; SUBCUTANEOUS
Qty: 4 VIAL | Refills: 0 | Status: SHIPPED | COMMUNITY
Start: 2018-09-08 | End: 2019-06-17 | Stop reason: ALTCHOICE

## 2018-09-08 NOTE — TELEPHONE ENCOUNTER
Med list and chart notes reviewed.   Pharmacy Assistance Medication approved for pickup.    humalog tid prn sliding scale

## 2018-09-12 NOTE — PROGRESS NOTES
Follow up Chronic Condition Focus: Diabetes Unable to reach patient by phone. Letter mailed to patient. Patient has my number if questions arise. ADDEND: 10/8/18- No response from patient. Episode closed. Goals Addressed Most Recent  COMPLETED: Attends follow-up appointments as directed. Patient was a no show for PCP follow up.  COMPLETED: Knowledge and adherence of prescribed medication (ie. action, side effects, missed dose, etc.). Not on track (9/7/2018)  COMPLETED: Understands and monitors blood sugar levels  COMPLETED: Understands red flags post discharge.

## 2018-09-18 NOTE — TELEPHONE ENCOUNTER
Received Lantus 100units/ml x4 vials from [facility] patient assistance program. Order routed to provider and letter mailed to patient for .

## 2018-09-18 NOTE — LETTER
9/18/2018 4:46 PM 
 
Mr. Valarie Bernal 
[address] [facility]. [address] Thank you for participating in the [address] [place] [address] Patient Assistance Program. 
 
This is notification that your Lantus was delivered to us. Please  your item(s) at one of our [facility] sites as soon as possible. Please plan to come early as a walk in or call to schedule an appointment, as you are overdue for a follow up with the provider. It is important to see you regularly to make sure your insulin is at the correct dosage. If you fail to follow-up for regular appointments, the [address] [place] [address] may discontinue providing your medication. To see when the [address] will be in your neighborhood, go to 
[email or web address] 
or call [phone], select your language (1 for english or 2 for Wolof), and then option 2. Sincerely, Eboni Arias MD

## 2018-09-21 RX ORDER — INSULIN GLARGINE 100 [IU]/ML
12 INJECTION, SOLUTION SUBCUTANEOUS 2 TIMES DAILY
Qty: 4 VIAL | Refills: 0 | Status: SHIPPED | COMMUNITY
Start: 2018-09-21 | End: 2019-06-17 | Stop reason: ALTCHOICE

## 2018-09-21 NOTE — TELEPHONE ENCOUNTER
Med list and chart notes reviewed.   Pharmacy Assistance Medication approved for pickup.    lantus 12 units bid  Pt needs hospital follow up appointment

## 2019-01-14 ENCOUNTER — TELEPHONE (OUTPATIENT)
Dept: FAMILY MEDICINE CLINIC | Age: 34
End: 2019-01-14

## 2019-01-14 NOTE — LETTER
1/14/2019 10:43 AM 
 
Mr. Andrade Wild 
[address] [facility] Dear Andrade Wild I am the Nurse Navigator working with the [address] [place] [address]. Just wanted to mail you the [facility] schedule and encourage you to follow up with us as soon as possible for your diabetes. You may come early as a walk in or call me to schedule an appointment. You can reach me directly at [phone]. Thank you for allowing us to participate in your care!  
 
 
 
 
Sincerely, 
 
 
Carolynn Blunt RN

## 2019-01-14 NOTE — TELEPHONE ENCOUNTER
Called patient to notify he never picked up his PAP Lantus and he is overdue for DM follow up. Pt states he does not have insurance or a new PCP. States it has been hard for him to get to clinic with his work. States he has been buying insulin OTC. Advised him to follow up as soon as he can and clinic calendar mailed to patient.

## 2019-04-09 ENCOUNTER — APPOINTMENT (OUTPATIENT)
Dept: GENERAL RADIOLOGY | Age: 34
End: 2019-04-09
Attending: PHYSICIAN ASSISTANT
Payer: SELF-PAY

## 2019-04-09 ENCOUNTER — HOSPITAL ENCOUNTER (EMERGENCY)
Age: 34
Discharge: HOME | End: 2019-04-09
Attending: EMERGENCY MEDICINE
Payer: SELF-PAY

## 2019-04-09 VITALS
SYSTOLIC BLOOD PRESSURE: 125 MMHG | DIASTOLIC BLOOD PRESSURE: 97 MMHG | BODY MASS INDEX: 20.76 KG/M2 | TEMPERATURE: 98.3 F | RESPIRATION RATE: 17 BRPM | WEIGHT: 167 LBS | HEIGHT: 75 IN | HEART RATE: 95 BPM | OXYGEN SATURATION: 98 %

## 2019-04-09 DIAGNOSIS — M79.674 GREAT TOE PAIN, RIGHT: ICD-10-CM

## 2019-04-09 DIAGNOSIS — L97.519 SKIN ULCER OF RIGHT GREAT TOE, UNSPECIFIED ULCER STAGE (HCC): Primary | ICD-10-CM

## 2019-04-09 DIAGNOSIS — R73.9 HYPERGLYCEMIA: ICD-10-CM

## 2019-04-09 LAB
ANION GAP SERPL CALC-SCNC: 6 MMOL/L (ref 3–18)
BASOPHILS # BLD: 0 K/UL (ref 0–0.1)
BASOPHILS NFR BLD: 1 % (ref 0–2)
BUN SERPL-MCNC: 18 MG/DL (ref 7–18)
BUN/CREAT SERPL: 16 (ref 12–20)
CALCIUM SERPL-MCNC: 9.2 MG/DL (ref 8.5–10.1)
CHLORIDE SERPL-SCNC: 99 MMOL/L (ref 100–108)
CO2 SERPL-SCNC: 31 MMOL/L (ref 21–32)
CREAT SERPL-MCNC: 1.13 MG/DL (ref 0.6–1.3)
DIFFERENTIAL METHOD BLD: NORMAL
EOSINOPHIL # BLD: 0.3 K/UL (ref 0–0.4)
EOSINOPHIL NFR BLD: 4 % (ref 0–5)
ERYTHROCYTE [DISTWIDTH] IN BLOOD BY AUTOMATED COUNT: 13.2 % (ref 11.6–14.5)
GLUCOSE SERPL-MCNC: 284 MG/DL (ref 74–99)
HCT VFR BLD AUTO: 43.6 % (ref 36–48)
HGB BLD-MCNC: 14.4 G/DL (ref 13–16)
LYMPHOCYTES # BLD: 2.1 K/UL (ref 0.9–3.6)
LYMPHOCYTES NFR BLD: 34 % (ref 21–52)
MCH RBC QN AUTO: 28.3 PG (ref 24–34)
MCHC RBC AUTO-ENTMCNC: 33 G/DL (ref 31–37)
MCV RBC AUTO: 85.7 FL (ref 74–97)
MONOCYTES # BLD: 0.6 K/UL (ref 0.05–1.2)
MONOCYTES NFR BLD: 10 % (ref 3–10)
NEUTS SEG # BLD: 3.2 K/UL (ref 1.8–8)
NEUTS SEG NFR BLD: 51 % (ref 40–73)
PLATELET # BLD AUTO: 374 K/UL (ref 135–420)
PMV BLD AUTO: 9.7 FL (ref 9.2–11.8)
POTASSIUM SERPL-SCNC: 4.4 MMOL/L (ref 3.5–5.5)
RBC # BLD AUTO: 5.09 M/UL (ref 4.7–5.5)
SODIUM SERPL-SCNC: 136 MMOL/L (ref 136–145)
WBC # BLD AUTO: 6.2 K/UL (ref 4.6–13.2)

## 2019-04-09 PROCEDURE — 73660 X-RAY EXAM OF TOE(S): CPT

## 2019-04-09 PROCEDURE — 80048 BASIC METABOLIC PNL TOTAL CA: CPT

## 2019-04-09 PROCEDURE — 85025 COMPLETE CBC W/AUTO DIFF WBC: CPT

## 2019-04-09 PROCEDURE — 99283 EMERGENCY DEPT VISIT LOW MDM: CPT

## 2019-04-09 RX ORDER — SULFAMETHOXAZOLE AND TRIMETHOPRIM 800; 160 MG/1; MG/1
1 TABLET ORAL 2 TIMES DAILY
Qty: 14 TAB | Refills: 0 | Status: SHIPPED | OUTPATIENT
Start: 2019-04-09 | End: 2019-04-16

## 2019-04-09 RX ORDER — CEPHALEXIN 500 MG/1
500 CAPSULE ORAL 4 TIMES DAILY
Qty: 28 CAP | Refills: 0 | Status: SHIPPED | OUTPATIENT
Start: 2019-04-09 | End: 2019-04-16

## 2019-04-09 NOTE — PROGRESS NOTES
Referral received from ED PA to assist with meds and podiatry follow up. Chart reviewed. Noted that pt was followed by [facility] and has medication assistance for his insulin. Nurse navigator had tried to get in touch with pt but pt not responding. Spoke with Deepti Levy, nurse navigator and made her aware that pt is in ED. She will call pt tomorrow to follow up. Spoke with pt at bedside. He states that he works and can't get to [facility] because not enough money to take bus. He's requesting [facility] monthly schedule and Deepti Levy made aware of his request. Pt states that he tried to apply for Medicaid in Aug when he was admitted but he was told he doesn't qualify. Spoke with Allison Barber from [place] and states that pt can reapply for Medicaid again and also the [facility] Called [employer] and left a message. Will follow up with her tomorrow. Pt made aware of plan.

## 2019-04-09 NOTE — LETTER
[address] [facility] EMERGENCY DEPT 
[address] [place] [address] [phone] 
[phone] Work/School Note Date: 4/9/2019 To Whom It May concern: 
 
Jeimy Shepard was seen and treated today in the emergency room by the following provider(s): 
Attending Provider: Ke Quinones MD 
Physician Assistant: Kwabena Cazares. Jeimy Shepard may return to work/school on 4/10/19 Sincerely, Kwabena Woody

## 2019-04-09 NOTE — ED PROVIDER NOTES
EMERGENCY DEPARTMENT HISTORY AND PHYSICAL EXAM 
 
Date: 4/9/2019 Patient Name: Tate Caraballo History of Presenting Illness Chief Complaint Patient presents with  Toe Pain History Provided By: Patient Chief Complaint: Right great toe wound Duration: unwilling to discuss Timing:  Gradual  
Location: medial aspect of right great toe Quality: aching Severity: Mild Modifying Factors: none Associated Symptoms: none Additional History (Context): Tate Caraballo is a 26 y.o. male with a history of type 1 diabetes who presents today for right great toe wound. Patient states he is not checking his blood sugars and takes his insulin intermittently. Patient states he does not have insurance. Patient has had multiple toe amputations on the right foot already. Patient does not have a podiatrist.  Patient has not been seen for this before. Patient denies fevers, chills, nausea or vomiting. Patient reports today in the beginning stages of wound in hopes to prevent future toe amputations. PCP: Ivone Oviedo MD 
 
Current Outpatient Medications Medication Sig Dispense Refill  cephALEXin (KEFLEX) 500 mg capsule Take 1 Cap by mouth four (4) times daily for 7 days. 28 Cap 0  
 trimethoprim-sulfamethoxazole (BACTRIM DS) 160-800 mg per tablet Take 1 Tab by mouth two (2) times a day for 7 days. 14 Tab 0  
 insulin glargine (LANTUS) 100 unit/mL injection 12 Units by SubCUTAneous route two (2) times a day. 4 Vial 0  
 insulin lispro (HUMALOG) 100 unit/mL injection Use per sliding scale 3 times daily with meals. 4 Vial 0  
 mupirocin (BACTROBAN) 2 % ointment Apply  to affected area every fourty-eight (48) hours. Apply to incision every 2-3 days. 22 g 0 Past History Past Medical History: 
Past Medical History:  
Diagnosis Date  Diabetes ([address] 1998 Past Surgical History: 
Past Surgical History:  
Procedure Laterality Date  HX AMPUTATION TOE    
 left and right foot  HX CYST INCISION AND DRAINAGE Right 08/21/2018  
 right 5th metatarsal (osteomyelitis) Family History: 
Family History Problem Relation Age of Onset  No Known Problems Mother  Heart Attack Father Social History: 
Social History Tobacco Use  Smoking status: Current Every Day Smoker Packs/day: 0.50  Smokeless tobacco: Never Used Substance Use Topics  Alcohol use: No  
 Drug use: No  
 
 
Allergies: 
No Known Allergies Review of Systems Review of Systems Constitutional: Negative for chills and fever. HENT: Negative for congestion, rhinorrhea and sore throat. Respiratory: Negative for cough and shortness of breath. Cardiovascular: Negative for chest pain. Gastrointestinal: Negative for abdominal pain, blood in stool, constipation, diarrhea, nausea and vomiting. Genitourinary: Negative for dysuria, frequency and hematuria. Musculoskeletal: Negative for back pain and myalgias. Skin: Positive for wound. Negative for rash. Neurological: Negative for dizziness and headaches. All other systems reviewed and are negative. All Other Systems Negative Physical Exam  
 
Vitals:  
 04/09/19 1500 BP: (!) 125/97 Pulse: 95 Resp: 17 Temp: 98.3 °F (36.8 °C) SpO2: 98% Weight: 75.8 kg (167 lb) Height: 6' 3\" (1.905 m) Physical Exam  
Constitutional: He is oriented to person, place, and time. He appears well-developed and well-nourished. No distress. HENT:  
Head: Normocephalic and atraumatic. Eyes: Conjunctivae are normal.  
Neck: Normal range of motion. Neck supple. Cardiovascular: Normal rate, regular rhythm and normal heart sounds. Pulmonary/Chest: Effort normal and breath sounds normal. No respiratory distress. He exhibits no tenderness. Abdominal: Soft. Bowel sounds are normal. He exhibits no distension. There is no tenderness. There is no rebound and no guarding. Musculoskeletal: Normal range of motion. He exhibits no edema or deformity. Neurological: He is alert and oriented to person, place, and time. Skin: Skin is warm and dry. He is not diaphoretic. Psychiatric: He has a normal mood and affect. Nursing note and vitals reviewed. Diagnostic Study Results Labs - Recent Results (from the past 12 hour(s)) CBC WITH AUTOMATED DIFF Collection Time: 04/09/19  4:00 PM  
Result Value Ref Range WBC 6.2 4.6 - 13.2 K/uL  
 RBC 5.09 4.70 - 5.50 M/uL  
 HGB 14.4 13.0 - 16.0 g/dL HCT 43.6 36.0 - 48.0 % MCV 85.7 74.0 - 97.0 FL  
 MCH 28.3 24.0 - 34.0 PG  
 MCHC 33.0 31.0 - 37.0 g/dL  
 RDW 13.2 11.6 - 14.5 % PLATELET 854 068 - 103 K/uL MPV 9.7 9.2 - 11.8 FL  
 NEUTROPHILS 51 40 - 73 % LYMPHOCYTES 34 21 - 52 % MONOCYTES 10 3 - 10 % EOSINOPHILS 4 0 - 5 % BASOPHILS 1 0 - 2 %  
 ABS. NEUTROPHILS 3.2 1.8 - 8.0 K/UL  
 ABS. LYMPHOCYTES 2.1 0.9 - 3.6 K/UL  
 ABS. MONOCYTES 0.6 0.05 - 1.2 K/UL  
 ABS. EOSINOPHILS 0.3 0.0 - 0.4 K/UL  
 ABS. BASOPHILS 0.0 0.0 - 0.1 K/UL  
 DF AUTOMATED METABOLIC PANEL, BASIC Collection Time: 04/09/19  4:00 PM  
Result Value Ref Range Sodium 136 136 - 145 mmol/L Potassium 4.4 3.5 - 5.5 mmol/L Chloride 99 (L) 100 - 108 mmol/L  
 CO2 31 21 - 32 mmol/L Anion gap 6 3.0 - 18 mmol/L Glucose 284 (H) 74 - 99 mg/dL BUN 18 7.0 - 18 MG/DL Creatinine 1.13 0.6 - 1.3 MG/DL  
 BUN/Creatinine ratio 16 12 - 20 GFR est AA >60 >60 ml/min/1.73m2 GFR est non-AA >60 >60 ml/min/1.73m2 Calcium 9.2 8.5 - 10.1 MG/DL Radiologic Studies -  
XR GREAT TOE RT MIN 2 V Final Result IMPRESSION:  
  
No acute bony abnormality. No plain radiographic evidence for acute  
osteomyelitis. CT Results  (Last 48 hours) None CXR Results  (Last 48 hours) None Medical Decision Making I am the first provider for this patient. I reviewed the vital signs, available nursing notes, past medical history, past surgical history, family history and social history. Vital Signs-Reviewed the patient's vital signs. Pulse Oximetry Analysis -  100 % RA Records Reviewed: Nursing Notes and Old Medical Records Procedures: None Procedures Provider Notes (Medical Decision Making):  
 
Differential: Hyper/hypoglycemia, DKA, osteomyelitis, diabetic ulcer, abscess, cellulitis Plan: Will order labs, xray and will discuss case with case management. 4:12 PM 
Lanie with case management states patient is a patient of the care a Megan , patient receives Lantus and short acting insulin, Phillip Abel will give him a glucose monitor prior to discharge. 6:17 PM 
X-ray negative for osteomyelitis, patient did speak with Phillip Abel about obtaining health insurance. Patient states he will follow-up with a [place]. Patient reports he has plenty of insulin at home. Offered patient insulin here, patient states he took insulin while waiting, and denies insulin now. Have stressed the importance of staying on top of ulcer. Will discharge home with course of antibiotics. Have discussed proper at home wound care. Patient agrees with the plan and management and states all questions have been thoroughly answered and there are no more remaining questions. MED RECONCILIATION: 
No current facility-administered medications for this encounter. Current Outpatient Medications Medication Sig  cephALEXin (KEFLEX) 500 mg capsule Take 1 Cap by mouth four (4) times daily for 7 days.  trimethoprim-sulfamethoxazole (BACTRIM DS) 160-800 mg per tablet Take 1 Tab by mouth two (2) times a day for 7 days.  insulin glargine (LANTUS) 100 unit/mL injection 12 Units by SubCUTAneous route two (2) times a day.  insulin lispro (HUMALOG) 100 unit/mL injection Use per sliding scale 3 times daily with meals.  mupirocin (BACTROBAN) 2 % ointment Apply  to affected area every fourty-eight (48) hours. Apply to incision every 2-3 days. Disposition: 
Home DISCHARGE NOTE:  
Pt has been reexamined. Patient has no new complaints, changes, or physical findings. Care plan outlined and precautions discussed. Results of workup were reviewed with the patient. All medications were reviewed with the patient. All of pt's questions and concerns were addressed. Patient was instructed and agrees to follow up with , podiatry, Carlene Moore as well as to return to the ED upon further deterioration. Patient is ready to go home. Follow-up Information Follow up With Specialties Details Why Contact [facility] EMERGENCY DEPT Emergency Medicine  As needed [address] 
[phone] [address] [facility] Call  [address] [address] ([facility]) [address] [phone] [address]    [address] [address] 
[phone] [facility]  Schedule an appointment as soon as possible for a visit  [address] [facility] [place] [address] [address] ([facility]) [address] [phone] Current Discharge Medication List  
  
START taking these medications Details  
cephALEXin (KEFLEX) 500 mg capsule Take 1 Cap by mouth four (4) times daily for 7 days. Qty: 28 Cap, Refills: 0  
  
trimethoprim-sulfamethoxazole (BACTRIM DS) 160-800 mg per tablet Take 1 Tab by mouth two (2) times a day for 7 days. Qty: 14 Tab, Refills: 0 Diagnosis Clinical Impression: 1. Skin ulcer of right great toe, unspecified ulcer stage ([address] 2. Great toe pain, right 3. Hyperglycemia

## 2019-04-09 NOTE — DISCHARGE INSTRUCTIONS
Patient Education        Learning About High Blood Sugar  What is high blood sugar? Your body turns the food you eat into glucose (sugar), which it uses for energy. But if your body isn't able to use the sugar right away, it can build up in your blood and lead to high blood sugar. When the amount of sugar in your blood stays too high for too much of the time, you may have diabetes. Diabetes is a disease that can cause serious health problems. The good news is that lifestyle changes may help you get your blood sugar back to normal and avoid or delay diabetes. What causes high blood sugar? Sugar (glucose) can build up in your blood if you:  · Are overweight. · Have a family history of diabetes. · Take certain medicines, such as steroids. What are the symptoms? Having high blood sugar may not cause any symptoms at all. Or it may make you feel very thirsty or very hungry. You may also urinate more often than usual, have blurry vision, or lose weight without trying. How is high blood sugar treated? You can take steps to lower your blood sugar level if you understand what makes it get higher. Your doctor may want you to learn how to test your blood sugar level at home. Then you can see how illness, stress, or different kinds of food or medicine raise or lower your blood sugar level. Other tests may be needed to see if you have diabetes. How can you prevent high blood sugar? · Watch your weight. If you're overweight, losing just a small amount of weight may help. Reducing fat around your waist is most important. · Limit the amount of calories, sweets, and unhealthy fat you eat. Ask your doctor if a dietitian can help you. A registered dietitian can help you create meal plans that fit your lifestyle. · Get at least 30 minutes of exercise on most days of the week. Exercise helps control your blood sugar. It also helps you maintain a healthy weight. Walking is a good choice.  You also may want to do other activities, such as running, swimming, cycling, or playing tennis or team sports. · If your doctor prescribed medicines, take them exactly as prescribed. Call your doctor if you think you are having a problem with your medicine. You will get more details on the specific medicines your doctor prescribes. Follow-up care is a key part of your treatment and safety. Be sure to make and go to all appointments, and call your doctor if you are having problems. It's also a good idea to know your test results and keep a list of the medicines you take. Where can you learn more? Go to http://london-mckenna.info/. Enter O108 in the search box to learn more about \"Learning About High Blood Sugar. \"  Current as of: July 25, 2018  Content Version: 11.9  © 7029-5240 [employer]. Care instructions adapted under license by [employer] (which disclaims liability or warranty for this information). If you have questions about a medical condition or this instruction, always ask your healthcare professional. [address] any warranty or liability for your use of this information.

## 2019-04-10 ENCOUNTER — PATIENT OUTREACH (OUTPATIENT)
Dept: FAMILY MEDICINE CLINIC | Age: 34
End: 2019-04-10

## 2019-04-10 NOTE — PROGRESS NOTES
ED Discharge Follow-Up Date/Time:     4/10/2019 2:44 PM 
 
Patient presented to [facility]  ED on 4/9/19 and was diagnosed with Skin ulcer of right great toe, Great toe pain, right, Hyperglycemia. Unable to reach patient by phone. Letter mailed to patient.

## 2019-04-10 NOTE — PROGRESS NOTES
4/10/19 
 
0850 Spoke with Deann Nichols from [address] [place] [address] and requested to assist pt with Medicaid application/pita.

## 2019-04-10 NOTE — LETTER
4/16/2019 11:04 AM 
 
Mr. Catracho Fuller 
[address] [facility] Catracho Fuller I am a Nurse Navigator working with Dr. Julee Ramos. Part of my job is to follow up with patients who have been in the emergency department to see how they are feeling, answer any questions they may have about their visit and also make sure they follow-up with their primary care doctor. I have been unable to reach you by telephone and wanted to make sure that you follow-up with Dr Julee Ramos about your recent visit to the hospital. I am enclosing the [facility] 15 schedule. In the meantime, if you have any questions or concerns, please feel free to call me on my direct line at [phone]. Thank you for allowing us to participate in your care!  
 
 
 
 
Sincerely, 
 
 
Isabel Winters RN

## 2019-06-16 ENCOUNTER — HOSPITAL ENCOUNTER (EMERGENCY)
Age: 34
Discharge: HOME | End: 2019-06-16
Attending: EMERGENCY MEDICINE
Payer: SELF-PAY

## 2019-06-16 ENCOUNTER — APPOINTMENT (OUTPATIENT)
Dept: GENERAL RADIOLOGY | Age: 34
End: 2019-06-16
Attending: PHYSICIAN ASSISTANT
Payer: SELF-PAY

## 2019-06-16 VITALS
SYSTOLIC BLOOD PRESSURE: 115 MMHG | RESPIRATION RATE: 21 BRPM | TEMPERATURE: 99.6 F | HEIGHT: 75 IN | WEIGHT: 167 LBS | HEART RATE: 104 BPM | BODY MASS INDEX: 20.76 KG/M2 | DIASTOLIC BLOOD PRESSURE: 61 MMHG | OXYGEN SATURATION: 100 %

## 2019-06-16 DIAGNOSIS — R73.9 HYPERGLYCEMIA: ICD-10-CM

## 2019-06-16 DIAGNOSIS — E13.621 DIABETIC ULCER OF TOE OF RIGHT FOOT ASSOCIATED WITH DIABETES MELLITUS OF OTHER TYPE, WITH NECROSIS OF MUSCLE (HCC): Primary | ICD-10-CM

## 2019-06-16 DIAGNOSIS — L97.513 DIABETIC ULCER OF TOE OF RIGHT FOOT ASSOCIATED WITH DIABETES MELLITUS OF OTHER TYPE, WITH NECROSIS OF MUSCLE (HCC): Primary | ICD-10-CM

## 2019-06-16 DIAGNOSIS — D72.829 LEUKOCYTOSIS, UNSPECIFIED TYPE: ICD-10-CM

## 2019-06-16 DIAGNOSIS — R79.89 ELEVATED LACTIC ACID LEVEL: ICD-10-CM

## 2019-06-16 LAB
ALBUMIN SERPL-MCNC: 3.7 G/DL (ref 3.4–5)
ALBUMIN/GLOB SERPL: 0.8 {RATIO} (ref 0.8–1.7)
ALP SERPL-CCNC: 184 U/L (ref 45–117)
ALT SERPL-CCNC: 24 U/L (ref 16–61)
ANION GAP SERPL CALC-SCNC: 7 MMOL/L (ref 3–18)
APPEARANCE UR: CLEAR
AST SERPL-CCNC: 19 U/L (ref 15–37)
BACTERIA URNS QL MICRO: NEGATIVE /HPF
BASOPHILS # BLD: 0 K/UL (ref 0–0.1)
BASOPHILS NFR BLD: 0 % (ref 0–2)
BILIRUB SERPL-MCNC: 0.8 MG/DL (ref 0.2–1)
BILIRUB UR QL: NEGATIVE
BUN SERPL-MCNC: 14 MG/DL (ref 7–18)
BUN/CREAT SERPL: 12 (ref 12–20)
CALCIUM SERPL-MCNC: 9.5 MG/DL (ref 8.5–10.1)
CHLORIDE SERPL-SCNC: 99 MMOL/L (ref 100–108)
CO2 SERPL-SCNC: 29 MMOL/L (ref 21–32)
COLOR UR: YELLOW
CREAT SERPL-MCNC: 1.21 MG/DL (ref 0.6–1.3)
DIFFERENTIAL METHOD BLD: ABNORMAL
EOSINOPHIL # BLD: 0.1 K/UL (ref 0–0.4)
EOSINOPHIL NFR BLD: 0 % (ref 0–5)
EPITH CASTS URNS QL MICRO: NORMAL /LPF (ref 0–5)
ERYTHROCYTE [DISTWIDTH] IN BLOOD BY AUTOMATED COUNT: 12.7 % (ref 11.6–14.5)
GLOBULIN SER CALC-MCNC: 4.5 G/DL (ref 2–4)
GLUCOSE BLD STRIP.AUTO-MCNC: 167 MG/DL (ref 70–110)
GLUCOSE BLD STRIP.AUTO-MCNC: 47 MG/DL (ref 70–110)
GLUCOSE BLD STRIP.AUTO-MCNC: 82 MG/DL (ref 70–110)
GLUCOSE SERPL-MCNC: 146 MG/DL (ref 74–99)
GLUCOSE UR STRIP.AUTO-MCNC: >1000 MG/DL
HCT VFR BLD AUTO: 35.4 % (ref 36–48)
HGB BLD-MCNC: 12.2 G/DL (ref 13–16)
HGB UR QL STRIP: ABNORMAL
KETONES UR QL STRIP.AUTO: NEGATIVE MG/DL
LACTATE BLD-SCNC: 2.12 MMOL/L (ref 0.4–2)
LACTATE BLD-SCNC: 2.46 MMOL/L (ref 0.4–2)
LEUKOCYTE ESTERASE UR QL STRIP.AUTO: NEGATIVE
LIPASE SERPL-CCNC: 31 U/L (ref 73–393)
LYMPHOCYTES # BLD: 0.9 K/UL (ref 0.9–3.6)
LYMPHOCYTES NFR BLD: 6 % (ref 21–52)
MCH RBC QN AUTO: 28.2 PG (ref 24–34)
MCHC RBC AUTO-ENTMCNC: 34.5 G/DL (ref 31–37)
MCV RBC AUTO: 81.9 FL (ref 74–97)
MONOCYTES # BLD: 0.2 K/UL (ref 0.05–1.2)
MONOCYTES NFR BLD: 1 % (ref 3–10)
NEUTS SEG # BLD: 14 K/UL (ref 1.8–8)
NEUTS SEG NFR BLD: 93 % (ref 40–73)
NITRITE UR QL STRIP.AUTO: NEGATIVE
PH BLDV: 7.4 [PH] (ref 7.32–7.42)
PH UR STRIP: 5 [PH] (ref 5–8)
PLATELET # BLD AUTO: 497 K/UL (ref 135–420)
PMV BLD AUTO: 9.2 FL (ref 9.2–11.8)
POTASSIUM SERPL-SCNC: 3.6 MMOL/L (ref 3.5–5.5)
PROT SERPL-MCNC: 8.2 G/DL (ref 6.4–8.2)
PROT UR STRIP-MCNC: 100 MG/DL
RBC # BLD AUTO: 4.32 M/UL (ref 4.7–5.5)
RBC #/AREA URNS HPF: NORMAL /HPF (ref 0–5)
SODIUM SERPL-SCNC: 135 MMOL/L (ref 136–145)
SP GR UR REFRACTOMETRY: 1.03 (ref 1–1.03)
TROPONIN I SERPL-MCNC: <0.02 NG/ML (ref 0–0.04)
UROBILINOGEN UR QL STRIP.AUTO: 0.2 EU/DL (ref 0.2–1)
WBC # BLD AUTO: 15.1 K/UL (ref 4.6–13.2)
WBC URNS QL MICRO: NORMAL /HPF (ref 0–4)

## 2019-06-16 PROCEDURE — 99285 EMERGENCY DEPT VISIT HI MDM: CPT

## 2019-06-16 PROCEDURE — 83690 ASSAY OF LIPASE: CPT

## 2019-06-16 PROCEDURE — 82800 BLOOD PH: CPT

## 2019-06-16 PROCEDURE — 96361 HYDRATE IV INFUSION ADD-ON: CPT

## 2019-06-16 PROCEDURE — 80053 COMPREHEN METABOLIC PANEL: CPT

## 2019-06-16 PROCEDURE — 85025 COMPLETE CBC W/AUTO DIFF WBC: CPT

## 2019-06-16 PROCEDURE — 96374 THER/PROPH/DIAG INJ IV PUSH: CPT

## 2019-06-16 PROCEDURE — 74011250637 HC RX REV CODE- 250/637: Performed by: PHYSICIAN ASSISTANT

## 2019-06-16 PROCEDURE — 83605 ASSAY OF LACTIC ACID: CPT

## 2019-06-16 PROCEDURE — 93005 ELECTROCARDIOGRAM TRACING: CPT

## 2019-06-16 PROCEDURE — 81001 URINALYSIS AUTO W/SCOPE: CPT

## 2019-06-16 PROCEDURE — 71045 X-RAY EXAM CHEST 1 VIEW: CPT

## 2019-06-16 PROCEDURE — 74011250636 HC RX REV CODE- 250/636: Performed by: PHYSICIAN ASSISTANT

## 2019-06-16 PROCEDURE — 84484 ASSAY OF TROPONIN QUANT: CPT

## 2019-06-16 PROCEDURE — 82962 GLUCOSE BLOOD TEST: CPT

## 2019-06-16 RX ORDER — CEPHALEXIN 250 MG/1
500 CAPSULE ORAL
Status: COMPLETED | OUTPATIENT
Start: 2019-06-16 | End: 2019-06-16

## 2019-06-16 RX ORDER — CLINDAMYCIN HYDROCHLORIDE 300 MG/1
300 CAPSULE ORAL 4 TIMES DAILY
Qty: 40 CAP | Refills: 0 | Status: SHIPPED | OUTPATIENT
Start: 2019-06-16 | End: 2019-06-26

## 2019-06-16 RX ORDER — ONDANSETRON 2 MG/ML
4 INJECTION INTRAMUSCULAR; INTRAVENOUS
Status: COMPLETED | OUTPATIENT
Start: 2019-06-16 | End: 2019-06-16

## 2019-06-16 RX ORDER — DOXYCYCLINE 100 MG/1
100 CAPSULE ORAL
Status: COMPLETED | OUTPATIENT
Start: 2019-06-16 | End: 2019-06-16

## 2019-06-16 RX ORDER — CEPHALEXIN 500 MG/1
1000 CAPSULE ORAL 2 TIMES DAILY
Qty: 40 CAP | Refills: 0 | Status: SHIPPED | OUTPATIENT
Start: 2019-06-16 | End: 2019-06-26

## 2019-06-16 RX ORDER — ACETAMINOPHEN 500 MG
1000 TABLET ORAL
Status: COMPLETED | OUTPATIENT
Start: 2019-06-16 | End: 2019-06-16

## 2019-06-16 RX ORDER — SODIUM CHLORIDE 0.9 % (FLUSH) 0.9 %
5-10 SYRINGE (ML) INJECTION AS NEEDED
Status: DISCONTINUED | OUTPATIENT
Start: 2019-06-16 | End: 2019-06-17 | Stop reason: HOSPADM

## 2019-06-16 RX ADMIN — CEPHALEXIN 500 MG: 250 CAPSULE ORAL at 22:51

## 2019-06-16 RX ADMIN — ONDANSETRON 4 MG: 2 INJECTION INTRAMUSCULAR; INTRAVENOUS at 19:52

## 2019-06-16 RX ADMIN — SODIUM CHLORIDE 1000 ML: 900 INJECTION, SOLUTION INTRAVENOUS at 19:57

## 2019-06-16 RX ADMIN — SODIUM CHLORIDE 1000 ML: 900 INJECTION, SOLUTION INTRAVENOUS at 20:28

## 2019-06-16 RX ADMIN — ACETAMINOPHEN 1000 MG: 500 TABLET, FILM COATED ORAL at 19:57

## 2019-06-16 RX ADMIN — DOXYCYCLINE 100 MG: 100 CAPSULE ORAL at 22:51

## 2019-06-16 NOTE — ED TRIAGE NOTES
Pt brought to ED via EMS c/o N/V and high blood sugar. Pt states took 4 units of insulin approx 30min ago.  Patient states he does not have health insurance and does not often see a . Hafsa Jacobs states he \"probably has some wounds on (his) feet that (he) tried to keep clean and covered\"

## 2019-06-16 NOTE — ED PROVIDER NOTES
EMERGENCY DEPARTMENT HISTORY AND PHYSICAL EXAM    Date: 6/16/2019  Patient Name: Aubrie Munoz    History of Presenting Illness     Chief Complaint   Patient presents with    High Blood Sugar    Nausea    Vomiting         History Provided By: Patient    Chief Complaint: hyperglycemia  Duration: 1 Days  Timing:  Acute  Location: global  Quality: n/a  Severity: Severe  Modifying Factors: none  Associated Symptoms: nausea, vomiting, high blood sugar      HPI: Aubrie Munoz is a 37 y.o. male with a PMH of diabetes who presents to the ER via EMS for hyperglycemia. Patient states his symptoms began suddenly today. He reports increased thirst, increased urinary frequency, nausea and vomiting. He administered 4 units of insulin prior to calling 911. Patient states his blood sugar was reading in the 400s. He denied any recent fevers, chills, shortness of breath, chest pain, cough, abdominal pain, dysuria and has no other symptoms or complaints. PCP: Sanaz Pina MD    Current Facility-Administered Medications   Medication Dose Route Frequency Provider Last Rate Last Dose    sodium chloride (NS) flush 5-10 mL  5-10 mL IntraVENous PRN Sae Ciara Zhang PA-C        sodium chloride 0.9 % bolus infusion 500 mL  500 mL IntraVENous ONCE Ciara Jett PA-C        cephALEXin (KEFLEX) capsule 500 mg  500 mg Oral NOW Ciara Jett PA-C        doxycycline (MONODOX) capsule 100 mg  100 mg Oral NOW Ciara Jett PA-C         Current Outpatient Medications   Medication Sig Dispense Refill    cephALEXin (KEFLEX) 500 mg capsule Take 2 Caps by mouth two (2) times a day for 10 days. 40 Cap 0    clindamycin (CLEOCIN) 300 mg capsule Take 1 Cap by mouth four (4) times daily for 10 days. 40 Cap 0    insulin glargine (LANTUS) 100 unit/mL injection 12 Units by SubCUTAneous route two (2) times a day. 4 Vial 0    insulin lispro (HUMALOG) 100 unit/mL injection Use per sliding scale 3 times daily with meals.  4 Vial 0    mupirocin (BACTROBAN) 2 % ointment Apply  to affected area every fourty-eight (48) hours. Apply to incision every 2-3 days. 22 g 0       Past History     Past Medical History:  Past Medical History:   Diagnosis Date    Diabetes ([address] 1998       Past Surgical History:  Past Surgical History:   Procedure Laterality Date    HX AMPUTATION TOE      left and right foot    HX CYST INCISION AND DRAINAGE Right 08/21/2018    right 5th metatarsal (osteomyelitis)       Family History:  Family History   Problem Relation Age of Onset    No Known Problems Mother     Heart Attack Father        Social History:  Social History     Tobacco Use    Smoking status: Current Every Day Smoker     Packs/day: 0.50    Smokeless tobacco: Never Used   Substance Use Topics    Alcohol use: No    Drug use: No       Allergies:  No Known Allergies      Review of Systems   Review of Systems   Constitutional: Positive for fatigue. Negative for chills and fever. HENT: Negative. Negative for sore throat. Eyes: Negative. Respiratory: Negative for cough and shortness of breath. Cardiovascular: Negative for chest pain and palpitations. Gastrointestinal: Positive for nausea. Negative for abdominal pain and vomiting. Endocrine: Positive for polydipsia and polyphagia. Genitourinary: Negative for dysuria. Musculoskeletal: Negative. Skin: Negative. Neurological: Negative for dizziness, weakness, light-headedness and headaches. Psychiatric/Behavioral: Negative. All other systems reviewed and are negative. Physical Exam     Vitals:    06/16/19 2030 06/16/19 2100 06/16/19 2115 06/16/19 2130   BP: 165/81 127/60 129/56 137/59   Pulse: (!) 124 (!) 105 (!) 118 (!) 106   Resp: 18 25 18 20   Temp:       SpO2: 100% 99% 100% 99%   Weight:       Height:         Physical Exam   Constitutional: He is oriented to person, place, and time. He appears well-developed and well-nourished. No distress.    HENT:   Head: Normocephalic and atraumatic. Mouth/Throat: Uvula is midline. Mucous membranes are dry. Eyes: Conjunctivae are normal. No scleral icterus. Neck: Neck supple. No JVD present. No tracheal deviation present. Cardiovascular: Regular rhythm and normal heart sounds. Tachycardia present. No murmur heard. Pulmonary/Chest: Effort normal and breath sounds normal. No respiratory distress. He has no wheezes. He has no rales. He exhibits no tenderness. Abdominal: Soft. Bowel sounds are normal. He exhibits no distension. There is no tenderness. There is no rebound and no guarding. Musculoskeletal: Normal range of motion. Neurological: He is alert and oriented to person, place, and time. He has normal strength. Gait normal. GCS eye subscore is 4. GCS verbal subscore is 5. GCS motor subscore is 6. Skin: Skin is warm and dry. He is not diaphoretic. Psychiatric: He has a normal mood and affect. Nursing note and vitals reviewed.         Diagnostic Study Results     Labs -     Recent Results (from the past 12 hour(s))   EKG, 12 LEAD, INITIAL    Collection Time: 06/16/19  7:24 PM   Result Value Ref Range    Ventricular Rate 112 BPM    Atrial Rate 112 BPM    P-R Interval 132 ms    QRS Duration 76 ms    Q-T Interval 302 ms    QTC Calculation (Bezet) 412 ms    Calculated P Axis 69 degrees    Calculated R Axis 44 degrees    Calculated T Axis 70 degrees    Diagnosis       Sinus tachycardia  Possible Left atrial enlargement  Septal infarct , age undetermined  Abnormal ECG  No previous ECGs available     GLUCOSE, POC    Collection Time: 06/16/19  7:29 PM   Result Value Ref Range    Glucose (POC) 167 (H) 70 - 110 mg/dL   CBC WITH AUTOMATED DIFF    Collection Time: 06/16/19  7:40 PM   Result Value Ref Range    WBC 15.1 (H) 4.6 - 13.2 K/uL    RBC 4.32 (L) 4.70 - 5.50 M/uL    HGB 12.2 (L) 13.0 - 16.0 g/dL    HCT 35.4 (L) 36.0 - 48.0 %    MCV 81.9 74.0 - 97.0 FL    MCH 28.2 24.0 - 34.0 PG    MCHC 34.5 31.0 - 37.0 g/dL    RDW 12.7 11.6 - 14.5 % PLATELET 579 (H) 196 - 420 K/uL    MPV 9.2 9.2 - 11.8 FL    NEUTROPHILS 93 (H) 40 - 73 %    LYMPHOCYTES 6 (L) 21 - 52 %    MONOCYTES 1 (L) 3 - 10 %    EOSINOPHILS 0 0 - 5 %    BASOPHILS 0 0 - 2 %    ABS. NEUTROPHILS 14.0 (H) 1.8 - 8.0 K/UL    ABS. LYMPHOCYTES 0.9 0.9 - 3.6 K/UL    ABS. MONOCYTES 0.2 0.05 - 1.2 K/UL    ABS. EOSINOPHILS 0.1 0.0 - 0.4 K/UL    ABS. BASOPHILS 0.0 0.0 - 0.1 K/UL    DF AUTOMATED     METABOLIC PANEL, COMPREHENSIVE    Collection Time: 06/16/19  7:40 PM   Result Value Ref Range    Sodium 135 (L) 136 - 145 mmol/L    Potassium 3.6 3.5 - 5.5 mmol/L    Chloride 99 (L) 100 - 108 mmol/L    CO2 29 21 - 32 mmol/L    Anion gap 7 3.0 - 18 mmol/L    Glucose 146 (H) 74 - 99 mg/dL    BUN 14 7.0 - 18 MG/DL    Creatinine 1.21 0.6 - 1.3 MG/DL    BUN/Creatinine ratio 12 12 - 20      GFR est AA >60 >60 ml/min/1.73m2    GFR est non-AA >60 >60 ml/min/1.73m2    Calcium 9.5 8.5 - 10.1 MG/DL    Bilirubin, total 0.8 0.2 - 1.0 MG/DL    ALT (SGPT) 24 16 - 61 U/L    AST (SGOT) 19 15 - 37 U/L    Alk.  phosphatase 184 (H) 45 - 117 U/L    Protein, total 8.2 6.4 - 8.2 g/dL    Albumin 3.7 3.4 - 5.0 g/dL    Globulin 4.5 (H) 2.0 - 4.0 g/dL    A-G Ratio 0.8 0.8 - 1.7     LIPASE    Collection Time: 06/16/19  7:40 PM   Result Value Ref Range    Lipase 31 (L) 73 - 393 U/L   PH, VENOUS    Collection Time: 06/16/19  7:40 PM   Result Value Ref Range    VENOUS PH 7.40 7.32 - 7.42     TROPONIN I    Collection Time: 06/16/19  7:40 PM   Result Value Ref Range    Troponin-I, QT <0.02 0.0 - 0.045 NG/ML   POC LACTIC ACID    Collection Time: 06/16/19  7:58 PM   Result Value Ref Range    Lactic Acid (POC) 2.12 (HH) 0.40 - 2.00 mmol/L   URINALYSIS W/ RFLX MICROSCOPIC    Collection Time: 06/16/19  8:15 PM   Result Value Ref Range    Color YELLOW      Appearance CLEAR      Specific gravity 1.028 1.005 - 1.030      pH (UA) 5.0 5.0 - 8.0      Protein 100 (A) NEG mg/dL    Glucose >1,000 (A) NEG mg/dL    Ketone NEGATIVE  NEG mg/dL    Bilirubin NEGATIVE  NEG      Blood SMALL (A) NEG      Urobilinogen 0.2 0.2 - 1.0 EU/dL    Nitrites NEGATIVE  NEG      Leukocyte Esterase NEGATIVE  NEG     URINE MICROSCOPIC ONLY    Collection Time: 06/16/19  8:15 PM   Result Value Ref Range    WBC 0 to 1 0 - 4 /hpf    RBC 0 to 1 0 - 5 /hpf    Epithelial cells FEW 0 - 5 /lpf    Bacteria NEGATIVE  NEG /hpf   GLUCOSE, POC    Collection Time: 06/16/19  8:29 PM   Result Value Ref Range    Glucose (POC) 47 (LL) 70 - 110 mg/dL   GLUCOSE, POC    Collection Time: 06/16/19  8:49 PM   Result Value Ref Range    Glucose (POC) 82 70 - 110 mg/dL   POC LACTIC ACID    Collection Time: 06/16/19  9:57 PM   Result Value Ref Range    Lactic Acid (POC) 2.46 (HH) 0.40 - 2.00 mmol/L       Radiologic Studies -   XR CHEST PORT    (Results Pending)     CT Results  (Last 48 hours)    None        CXR Results  (Last 48 hours)    None            Medical Decision Making   I am the first provider for this patient. I reviewed the vital signs, available nursing notes, past medical history, past surgical history, family history and social history. Vital Signs-Reviewed the patient's vital signs. Records Reviewed: Nursing Notes and Old Medical Records     7:28 PM  38 y/o male who presents to the ER via EMS c/o hyperglycemia and nausea. Patient reports sudden onset of symptoms around 5 PM today. Patient states he gets like this usually once a year related to his blood sugar getting too high. Patient is very dry appearing and tacky on exam with a mildly elevated temperature. Denied any complaints like fever, chills, cough, abdominal pain or dysuria. We will plan on labs, fluid bolus and evaluate for possible septic work-up. Patient states he administered 4 units of insulin prior to EMS arrival.  Miroslava Alfonso PA-C    10:43 PM  Patient's temperature has improved however he is still tachycardic and his repeat lactic acid noted to be mildly elevated compared to initial lactic acid.   Urinalysis and chest x-ray with no acute process. Patient does admit that he has several diabetic foot ulcers on his right foot that he does not want to be evaluated. Patient states he has not followed up with [facility] since his last discharge. Patient states he has to work and is unable to follow-up with his primary care or podiatry as directed. He has been dressing his wound ulcers how he can. Patient noted to have very superficial ulcer at the base of his great toe however a more significant ulcer at the base of his first metatarsal concerning for deep muscle involvement. Patient states due to personal reasons he is unable to be admitted to the hospital at this time as he needs to go home and continue with his other priorities. Patient in agreement to take antibiotics and he will follow-up with podiatry first thing tomorrow morning. Patient states when he is evaluated with the podiatrist and they recommend if he needs to be admitted he will return to the hospital at that point. Discussed my concern with patient that he may require amputation of toes or even worse the bilateral lower extremities due to the worsening ulcers in his condition. Patient expressed full understanding of the potential consequences however still would like to be discharged. All questions answered and patient in agreement with plan of care. Will plan for discharge. Aime Frost PA-C    Disposition:  discharged    DISCHARGE NOTE:       Care plan outlined and precautions discussed. Patient has no new complaints, changes, or physical findings. Results of labs, imaging were reviewed with the patient. All medications were reviewed with the patient; will d/c home with doxy and keflex. All of pt's questions and concerns were addressed. Patient was instructed and agrees to follow up with pcp and podiatry, as well as to return to the ED upon further deterioration. Patient is ready to go home.     Follow-up Information     Follow up With Specialties Details Why [address] [facility] EMERGENCY DEPT Emergency Medicine  If symptoms worsen [address] [facility] [address]    Shahrzad Borrero MD Family Practice Call in 1 day ER follow up for diabetic foot ulcer [address] [facility] [address] [facility]      Howard Howard DPM Podiatry Call in 1 day Foot and ankle follow up for diabetic foot ulcer as we discussed [address]  [phone]            Current Discharge Medication List      START taking these medications    Details   cephALEXin (KEFLEX) 500 mg capsule Take 2 Caps by mouth two (2) times a day for 10 days. Qty: 40 Cap, Refills: 0      clindamycin (CLEOCIN) 300 mg capsule Take 1 Cap by mouth four (4) times daily for 10 days. Qty: 40 Cap, Refills: 0             Provider Notes (Medical Decision Making):     Procedures:  Procedures        Diagnosis     Clinical Impression:   1. Diabetic ulcer of toe of right foot associated with diabetes mellitus of other type, with necrosis of muscle (HCC)    2. Elevated lactic acid level    3. Leukocytosis, unspecified type    4.  Hyperglycemia

## 2019-06-17 ENCOUNTER — PATIENT OUTREACH (OUTPATIENT)
Dept: FAMILY MEDICINE CLINIC | Age: 34
End: 2019-06-17

## 2019-06-17 NOTE — PROGRESS NOTES
ED Discharge Follow-Up    Date/Time:     2019 11:21 AM    Patient presented to [facility]  ED on 19 and was diagnosed with Diabetic ulcer of toe of right foot, Elevated lactic acid, Leukocytosis, Hyperglycemia. Nurse Navigator(NN) contacted the  patient  by telephone to perform post ED discharge assessment. Verified name and  with patient as identifiers. Provided introduction to self, and explanation of the Nurse Navigator role. Patient reported assessment: Patient is currently at work so cannot talk long. Reports he is feeling better; denies n/v. States he does not have a thermometer to check his temperature, but states he still feels \"warm\" and has a headache. Reports BG today 188. Pt has not been able to  antibiotics d/t financial barrier. Pt states he will call Podiatry to schedule an appt on his lunch break today. Called Bisi Muller, [facility] ED care manager, to request indigent meds for patient. [place] requests patient call her to arrange. Called patient and left message with Lanie's number [phone]. Medication(s):   New Medications at Discharge: clindamycin, cephalexin  Changed Medications at Discharge: none  Discontinued Medications at Discharge: none    There were barriers to obtaining medications identified at this time. Reviewed discharge instructions and red flags with  patient who voiced understanding. Patient given an opportunity to ask questions and does not have any further questions or concerns at this time. The patient agrees to contact the PCP office for questions related to their healthcare. Offered follow up appointment with PCP: yes, Pt declined d/t his work schedule. [facility] follow up appointment(s): No future appointments. Goals      Patient will follow up with Podiatry and PCP.  Patient will obtain indigent meds from [facility] ED.

## 2019-06-17 NOTE — ED NOTES
Patient provided with box lunch at this time. Patient refusing to let this nurse or provider look at his feet.

## 2019-06-17 NOTE — ED NOTES
Patient states he feels his blood sugar is dropping, per provider check POC glucose STAT. Pt BG evaluated at 43, then repeated immediately at 47. Provider notified. Patient provided with 8oz apple juice at this time per providers verbal orders.

## 2019-06-17 NOTE — DISCHARGE INSTRUCTIONS
Patient Education        Diabetic Foot Ulcer: Care Instructions  Your Care Instructions  Diabetes can damage the nerve endings and blood vessels in your feet. That means you are less likely to notice when your feet are injured. A small skin problem like a callus, blister, or cracked skin can turn into a larger sore, called a foot ulcer. Foot ulcers form most often on the pad (ball) of the foot or the bottom of the big toe. You can also get them on the top and bottom of each toe. Foot ulcers can get infected. If the infection is severe, then tissue in the foot can die. This is called gangrene. In that case, one or more of the toes, part or all of the foot, and sometimes part of the leg may have to be removed (amputated). Your doctor may have removed the dead tissue and cleaned the ulcer. Your foot wound may be wrapped in a protective bandage. It is very important to keep your weight off your injured foot. After a foot ulcer has formed, it will not heal as long as you keep putting weight on the area. Always get early treatment for foot problems. A minor irritation can lead to a major problem if it's not taken care of soon. Follow-up care is a key part of your treatment and safety. Be sure to make and go to all appointments, and call your doctor if you are having problems. It's also a good idea to know your test results and keep a list of the medicines you take. How can you care for yourself at home? · Follow your doctor's instructions about keeping pressure off the foot ulcer. You may need to use crutches or a wheelchair. Or you may wear a cast or a walking boot. · Follow your doctor's instructions on how to clean the ulcer and change the bandage. · If your doctor prescribed antibiotics, take them as directed. Do not stop taking them just because you feel better. You need to take the full course of antibiotics.   To prevent foot ulcers  · Keep your blood sugar close to normal by watching what and how much you eat. Track your blood sugar, take medicines if prescribed, and get regular exercise. · Do not smoke. Smoking affects blood flow and can make foot problems worse. If you need help quitting, talk to your doctor about stop-smoking programs and medicines. These can increase your chances of quitting for good. · Do not go barefoot. Protect your feet by wearing shoes that fit well. Choose shoes that are made of materials that are flexible and breathable, such as leather or cloth. · Inspect your feet daily for blisters, cuts, cracks, or sores. If you can't see well, use a mirror or have someone help you. · Have your doctor check your feet during each visit. If you have a foot problem, see your doctor. Do not try to treat your foot problem on your own. Home remedies or treatments that you can buy without a prescription (such as corn removers) can be harmful. When should you call for help? Call your doctor now or seek immediate medical care if:    · You have symptoms of infection, such as:  ? Increased pain, swelling, warmth, or redness. ? Red streaks leading from the area. ? Pus draining from the area. ? A fever.    Watch closely for changes in your health, and be sure to contact your doctor if:    · You have a new problem with your feet, such as:  ? A new sore or ulcer. ? A break in the skin that is not healing after several days. ? Bleeding corns or calluses. ? An ingrown toenail.     · You do not get better as expected. Where can you learn more? Go to http://london-mckenna.info/. Enter T131 in the search box to learn more about \"Diabetic Foot Ulcer: Care Instructions. \"  Current as of: July 25, 2018  Content Version: 11.9  © 8831-9258 [employer]. Care instructions adapted under license by [employer] (which disclaims liability or warranty for this information).  If you have questions about a medical condition or this instruction, always ask your healthcare professional. [address] any warranty or liability for your use of this information.

## 2019-06-18 LAB
ATRIAL RATE: 112 BPM
CALCULATED P AXIS, ECG09: 69 DEGREES
CALCULATED R AXIS, ECG10: 44 DEGREES
CALCULATED T AXIS, ECG11: 70 DEGREES
DIAGNOSIS, 93000: NORMAL
P-R INTERVAL, ECG05: 132 MS
Q-T INTERVAL, ECG07: 302 MS
QRS DURATION, ECG06: 76 MS
QTC CALCULATION (BEZET), ECG08: 412 MS
VENTRICULAR RATE, ECG03: 112 BPM

## 2019-06-18 NOTE — PROGRESS NOTES
6/17/19  1235  Received a call from Eloisa Soto nurse navigator. She states that she spoke with pt. Pt was seen in ED last Saturday and was prescribed with 2 antibiotics but pt unable to afford until he get paid in 2 weeks. She's requesting if we can assist pt. Pt is diabetic and history of osteomyelitis. She will ask pt to call here. 6/18/19  1130 Received a call from pt and confirmed needing assistance with RX. Advise will call back once request is approved. [address] application completed and faxed to [employer]. Pt was made aware and will  meds at pharmacy. Harvey Ruelas was made aware.

## 2019-06-26 ENCOUNTER — PATIENT OUTREACH (OUTPATIENT)
Dept: FAMILY MEDICINE CLINIC | Age: 34
End: 2019-06-26

## 2019-06-28 NOTE — PROGRESS NOTES
Transitions of Care follow up for Diabetic ulcer of toe of right foot, Elevated lactic acid, Leukocytosis, Hyperglycemia     Patient reports he is feeling well; denies n/v/fever. Reports his foot \"is fine\". Pt has very short responses and not very forthcoming with updates on his condition. Goals Addressed                 This Visit's Progress     Patient will follow up with Podiatry and PCP. On track     6/28/19- Scheduled patient to follow up with PCP. Pt still has not followed up with Podiatry. States he will try to f/u next Wed.  COMPLETED: Patient will obtain indigent meds from [facility] ED. On track     6/28/19- Patient connected with ED case manager and was able to  his antibiotics at no cost to him. PCP/Specialist follow up:   Future Appointments   Date Time Provider Sofie Jhaveri   7/15/2019  1:00 PM Brian Cheatham MD [address] [facility]      Will follow up with patient after PCP appt to assess needs. Patient has my number if questions arise.

## 2019-07-15 ENCOUNTER — OFFICE VISIT (OUTPATIENT)
Dept: FAMILY MEDICINE CLINIC | Age: 34
End: 2019-07-15

## 2019-07-15 ENCOUNTER — HOSPITAL ENCOUNTER (OUTPATIENT)
Dept: LAB | Age: 34
Discharge: HOME | End: 2019-07-15

## 2019-07-15 VITALS
DIASTOLIC BLOOD PRESSURE: 80 MMHG | SYSTOLIC BLOOD PRESSURE: 130 MMHG | WEIGHT: 159.2 LBS | OXYGEN SATURATION: 98 % | HEIGHT: 75 IN | HEART RATE: 83 BPM | RESPIRATION RATE: 14 BRPM | BODY MASS INDEX: 19.79 KG/M2

## 2019-07-15 DIAGNOSIS — E10.621 DIABETIC ULCER OF RIGHT FOOT ASSOCIATED WITH TYPE 1 DIABETES MELLITUS, LIMITED TO BREAKDOWN OF SKIN, UNSPECIFIED PART OF FOOT (HCC): ICD-10-CM

## 2019-07-15 DIAGNOSIS — E10.9 TYPE 1 DIABETES MELLITUS WITHOUT COMPLICATION (HCC): Primary | ICD-10-CM

## 2019-07-15 DIAGNOSIS — F17.200 SMOKER: ICD-10-CM

## 2019-07-15 DIAGNOSIS — L97.511 DIABETIC ULCER OF RIGHT FOOT ASSOCIATED WITH TYPE 1 DIABETES MELLITUS, LIMITED TO BREAKDOWN OF SKIN, UNSPECIFIED PART OF FOOT (HCC): ICD-10-CM

## 2019-07-15 LAB
CHOLEST SERPL-MCNC: 155 MG/DL
EST. AVERAGE GLUCOSE BLD GHB EST-MCNC: 240 MG/DL
HBA1C MFR BLD: 10 % (ref 4.2–5.6)
HDLC SERPL-MCNC: 57 MG/DL (ref 40–60)
HDLC SERPL: 2.7 {RATIO} (ref 0–5)
LDLC SERPL CALC-MCNC: 70.4 MG/DL (ref 0–100)
LIPID PROFILE,FLP: NORMAL
TRIGL SERPL-MCNC: 138 MG/DL (ref ?–150)
VLDLC SERPL CALC-MCNC: 27.6 MG/DL

## 2019-07-15 PROCEDURE — 83036 HEMOGLOBIN GLYCOSYLATED A1C: CPT

## 2019-07-15 PROCEDURE — 82043 UR ALBUMIN QUANTITATIVE: CPT

## 2019-07-15 PROCEDURE — 80061 LIPID PANEL: CPT

## 2019-07-15 RX ORDER — INSULIN GLARGINE 100 [IU]/ML
INJECTION, SOLUTION SUBCUTANEOUS
Qty: 1 VIAL | Refills: 0 | Status: SHIPPED | COMMUNITY
Start: 2019-07-15 | End: 2019-07-15 | Stop reason: SDUPTHER

## 2019-07-15 RX ORDER — INSULIN LISPRO 100 [IU]/ML
INJECTION, SOLUTION INTRAVENOUS; SUBCUTANEOUS
Qty: 1 VIAL | Refills: 0 | Status: SHIPPED | COMMUNITY
Start: 2019-07-15 | End: 2019-07-15 | Stop reason: SDUPTHER

## 2019-07-15 RX ORDER — INSULIN GLARGINE 100 [IU]/ML
INJECTION, SOLUTION SUBCUTANEOUS
Qty: 1 VIAL | Refills: 0 | Status: SHIPPED | COMMUNITY
Start: 2019-07-15 | End: 2019-07-25 | Stop reason: SDUPTHER

## 2019-07-15 RX ORDER — INSULIN GLARGINE 100 [IU]/ML
INJECTION, SOLUTION SUBCUTANEOUS
Qty: 2 VIAL | Refills: 0 | Status: SHIPPED | COMMUNITY
Start: 2019-07-15 | End: 2019-07-15 | Stop reason: SDUPTHER

## 2019-07-15 RX ORDER — INSULIN LISPRO 100 [IU]/ML
INJECTION, SOLUTION INTRAVENOUS; SUBCUTANEOUS
Qty: 1 VIAL | Refills: 0 | Status: SHIPPED | COMMUNITY
Start: 2019-07-15 | End: 2019-09-06 | Stop reason: DRUGHIGH

## 2019-07-15 NOTE — PROGRESS NOTES
HPI  Magaly Singh is a 37 y.o. male being seen today for   Chief Complaint   Patient presents with    Follow-up   . follow up for this pt with type on dm seen one time about a year ago. We ordered insulin for him through pap program but he never came back to get it and has been hopitalized since then for right 5th toe amputation last year. At that time his a1c was 6.   he states that he has been buying his insulin over the counter. Taking NPH 16 units at dinner, regular insulin sliding scale based on what he eats, usuallly 5 units with meals. In the past he tried taking nph in the am as well but states he got low before lunch due to he does not eat breakfast and he walks to work in the morning. Checks sugars 1-2x a day and gets sugars 160-200s. Rarely into the 300s. Occasionally runs out of NPH due to finances. Almost never gets low. Smoking about 1/2ppd and thinks he can easily quit. Seen in  ED about a month ago with low grade temp and nausea assoicated with high blood sugar. Apparently he took 14 units of regular insulin for his sugar of over 400 and then had subsequent low in ED which was treated. He was also noted to have foot ulcer and was prescribed antibiotic. He did take abx and states his ulcer is healing with decreased drainage and his fever is gone. Has plans to see podiatry but he is calling around to see who is most affordable. Past Medical History:   Diagnosis Date    Diabetes ([facility] [address] 1998         ROS  Patient states that he is feeling well. Denies complaints of chest pain, shortness of breath, swelling of legs, dizziness or weakness. he denies nausea, vomiting or diarrhea. Current Outpatient Medications   Medication Sig    insulin glargine (LANTUS) 100 unit/mL injection 16 units daily    insulin lispro (HUMALOG) 100 unit/mL injection Sliding scale tid with meals    insulin NPH (NOVOLIN N NPH U-100 INSULIN) 100 unit/mL injection by SubCUTAneous route two (2) times a day.  mupirocin (BACTROBAN) 2 % ointment Apply  to affected area every fourty-eight (48) hours. Apply to incision every 2-3 days. No current facility-administered medications for this visit. PE  Visit Vitals  /80 (BP 1 Location: Left arm, BP Patient Position: Sitting)   Pulse 83   Resp 14   Ht 6' 3\" (1.905 m)   Wt 159 lb 3.2 oz (72.2 kg)   SpO2 98%   BMI 19.90 kg/m²        Alert and oriented with normal mood and affect. he is well developed and well nourished . Lungs are clear without wheezing. Heart rate is regular without murmurs or gallops. There is no lower extremity edema. Assessment and Plan:        ICD-10-CM ICD-9-CM    1. Type 1 diabetes mellitus without complication (HCC)  Check a1c. Anticipate his a1c is not at goal.  He would benefit from increasing basal insulin but limited by lows in mid morning. Will provide samples of lantus and this may reduce his sugar variability. Update his PAP application for free insulin. Will also ask him to check fasting blood sugars. E10.9 250.01    2. Diabetic ulcer of right foot associated with type 1 diabetes mellitus, limited to breakdown of skin, unspecified part of foot ([facility] [address]    We will try to find affordable podiatrist for him. Will also help a lot to control his bg and stop smoking. E10.621 250.81 REFERRAL TO PODIATRY    L97.511 707.15    3. Smoker  He declines a quit aid at this time and believes he can quit. Encouraged to do so and we reviewed how it is playing a part in his amputations. F17.200 305.1      Follow up pending his lab results and he is also in case management for DM with nurse navigator. Given medicaid application and he will send this in.   Even if he does not qualify, will be useful to have medicaid denial letter for financial applications      Maribel Burton MD

## 2019-07-15 NOTE — PROGRESS NOTES
Discharge instructions reviewed with patient    Medication list and understanding of medications reviewed with patient. OTC and herbal medications reviewed and added to med list if applicable  Barriers to adherence assessed. Guidance given regarding new medications this visit, including reason for taking this medicine, and common side effects. Samples given  Lantus Memobox Lt # [id number] Ex 3/2020    Humalog Aster Lt # [id number] Ex 8/2020    AVS Explained to patient.

## 2019-07-15 NOTE — PROGRESS NOTES
\"pt stated that he went to the ER 3 weeks ago pt stated that he had lots of things going on blood sugar fever

## 2019-07-16 ENCOUNTER — TELEPHONE (OUTPATIENT)
Dept: FAMILY MEDICINE CLINIC | Age: 34
End: 2019-07-16

## 2019-07-16 LAB
CREAT UR-MCNC: 396 MG/DL (ref 30–125)
MICROALBUMIN UR-MCNC: 5.55 MG/DL (ref 0–3)
MICROALBUMIN/CREAT UR-RTO: 14 MG/G (ref 0–30)

## 2019-07-16 NOTE — TELEPHONE ENCOUNTER
Left message  machine - Podiatrist appt made 8/15/19 with dr. Haney Slice- letter mailed. Please call CAV to [facility].

## 2019-07-16 NOTE — TELEPHONE ENCOUNTER
Patient does not have transportation of funds to go to  [facility] . Patient does not and will not have funds available until after his August 2019 housing relocation is complete. Patient expressed understanding that we schedule in August.- apply  for [facility].

## 2019-07-18 ENCOUNTER — PATIENT OUTREACH (OUTPATIENT)
Dept: FAMILY MEDICINE CLINIC | Age: 34
End: 2019-07-18

## 2019-07-19 NOTE — PROGRESS NOTES
Follow up Chronic Condition    Focus: Diabetes    Two patient identifiers verified. Offered case management to patient for uncontrolled diabetes. Discussed the role of Nurse Navigator and case management program.  Patient agrees to case management services at this time. Patient had recent A1C of 10. Recently in ED for infection from chronic right foot ulcer. Patient not currently followed by a podiatrist due to financial constraints. States the ulcer on the bottom of his foot is slow healing d/t being on his feet all day. Patient has been diabetic since the age of 11. At that time he was living in a group home and received diabetes teaching and assistance from nurses there. Patient would like assistance with his diet. He is not currently checking BG due to the test strips for his meter are too expensive. Pt plans to purchase the Rel[employer] On meter. Patient currently at work. He is off Tues and Wed of next week. Will call back then to discuss diet. Pt has my contact information for any further questions, concerns, or needs. Patient expressed no questions, concerns or needs for this NN at this time.

## 2019-07-22 ENCOUNTER — HOSPITAL ENCOUNTER (EMERGENCY)
Age: 34
Discharge: HOME | End: 2019-07-23
Attending: EMERGENCY MEDICINE
Payer: SELF-PAY

## 2019-07-22 ENCOUNTER — APPOINTMENT (OUTPATIENT)
Dept: GENERAL RADIOLOGY | Age: 34
End: 2019-07-22
Attending: EMERGENCY MEDICINE
Payer: SELF-PAY

## 2019-07-22 DIAGNOSIS — E11.621 DIABETIC ULCER OF FOOT ASSOCIATED WITH TYPE 2 DIABETES MELLITUS, WITH MUSCLE INVOLVEMENT WITHOUT EVIDENCE OF NECROSIS, UNSPECIFIED LATERALITY, UNSPECIFIED PART OF FOOT (HCC): Primary | ICD-10-CM

## 2019-07-22 DIAGNOSIS — L97.505 DIABETIC ULCER OF FOOT ASSOCIATED WITH TYPE 2 DIABETES MELLITUS, WITH MUSCLE INVOLVEMENT WITHOUT EVIDENCE OF NECROSIS, UNSPECIFIED LATERALITY, UNSPECIFIED PART OF FOOT (HCC): Primary | ICD-10-CM

## 2019-07-22 PROCEDURE — 96360 HYDRATION IV INFUSION INIT: CPT

## 2019-07-22 PROCEDURE — 96361 HYDRATE IV INFUSION ADD-ON: CPT

## 2019-07-22 PROCEDURE — 99284 EMERGENCY DEPT VISIT MOD MDM: CPT

## 2019-07-22 PROCEDURE — 82962 GLUCOSE BLOOD TEST: CPT

## 2019-07-22 PROCEDURE — 73630 X-RAY EXAM OF FOOT: CPT

## 2019-07-23 ENCOUNTER — PATIENT OUTREACH (OUTPATIENT)
Dept: FAMILY MEDICINE CLINIC | Age: 34
End: 2019-07-23

## 2019-07-23 VITALS
HEART RATE: 100 BPM | SYSTOLIC BLOOD PRESSURE: 144 MMHG | RESPIRATION RATE: 16 BRPM | OXYGEN SATURATION: 100 % | BODY MASS INDEX: 20.27 KG/M2 | TEMPERATURE: 99.2 F | DIASTOLIC BLOOD PRESSURE: 83 MMHG | HEIGHT: 75 IN | WEIGHT: 163 LBS

## 2019-07-23 LAB
ALBUMIN SERPL-MCNC: 4.2 G/DL (ref 3.4–5)
ALBUMIN/GLOB SERPL: 0.9 {RATIO} (ref 0.8–1.7)
ALP SERPL-CCNC: 154 U/L (ref 45–117)
ALT SERPL-CCNC: 38 U/L (ref 16–61)
ANION GAP BLD CALC-SCNC: 16 MMOL/L (ref 10–20)
ANION GAP SERPL CALC-SCNC: 7 MMOL/L (ref 3–18)
APPEARANCE UR: CLEAR
AST SERPL-CCNC: 22 U/L (ref 10–38)
BASOPHILS # BLD: 0 K/UL (ref 0–0.1)
BASOPHILS NFR BLD: 0 % (ref 0–2)
BILIRUB SERPL-MCNC: 0.7 MG/DL (ref 0.2–1)
BILIRUB UR QL: NEGATIVE
BUN BLD-MCNC: 16 MG/DL (ref 7–18)
BUN SERPL-MCNC: 16 MG/DL (ref 7–18)
BUN/CREAT SERPL: 12 (ref 12–20)
CA-I BLD-MCNC: 1.21 MMOL/L (ref 1.12–1.32)
CALCIUM SERPL-MCNC: 9.4 MG/DL (ref 8.5–10.1)
CHLORIDE BLD-SCNC: 98 MMOL/L (ref 100–108)
CHLORIDE SERPL-SCNC: 98 MMOL/L (ref 100–111)
CO2 BLD-SCNC: 27 MMOL/L (ref 19–24)
CO2 SERPL-SCNC: 28 MMOL/L (ref 21–32)
COLOR UR: YELLOW
CREAT SERPL-MCNC: 1.35 MG/DL (ref 0.6–1.3)
CREAT UR-MCNC: 1.1 MG/DL (ref 0.6–1.3)
DIFFERENTIAL METHOD BLD: ABNORMAL
EOSINOPHIL # BLD: 0.2 K/UL (ref 0–0.4)
EOSINOPHIL NFR BLD: 2 % (ref 0–5)
ERYTHROCYTE [DISTWIDTH] IN BLOOD BY AUTOMATED COUNT: 14.2 % (ref 11.6–14.5)
GLOBULIN SER CALC-MCNC: 4.5 G/DL (ref 2–4)
GLUCOSE BLD STRIP.AUTO-MCNC: 259 MG/DL (ref 74–106)
GLUCOSE BLD STRIP.AUTO-MCNC: 547 MG/DL (ref 70–110)
GLUCOSE SERPL-MCNC: 267 MG/DL (ref 74–99)
GLUCOSE UR STRIP.AUTO-MCNC: 250 MG/DL
HCT VFR BLD AUTO: 36.2 % (ref 36–48)
HCT VFR BLD CALC: 41 % (ref 36–49)
HGB BLD-MCNC: 12.3 G/DL (ref 13–16)
HGB BLD-MCNC: 13.9 G/DL (ref 12–16)
HGB UR QL STRIP: NEGATIVE
KETONES UR QL STRIP.AUTO: NEGATIVE MG/DL
LACTATE BLD-SCNC: 1.83 MMOL/L (ref 0.4–2)
LEUKOCYTE ESTERASE UR QL STRIP.AUTO: NEGATIVE
LYMPHOCYTES # BLD: 2.3 K/UL (ref 0.9–3.6)
LYMPHOCYTES NFR BLD: 18 % (ref 21–52)
MCH RBC QN AUTO: 28.2 PG (ref 24–34)
MCHC RBC AUTO-ENTMCNC: 34 G/DL (ref 31–37)
MCV RBC AUTO: 83 FL (ref 74–97)
MONOCYTES # BLD: 1.4 K/UL (ref 0.05–1.2)
MONOCYTES NFR BLD: 11 % (ref 3–10)
NEUTS SEG # BLD: 8.8 K/UL (ref 1.8–8)
NEUTS SEG NFR BLD: 69 % (ref 40–73)
NITRITE UR QL STRIP.AUTO: NEGATIVE
PH UR STRIP: 6 [PH] (ref 5–8)
PLATELET # BLD AUTO: 512 K/UL (ref 135–420)
PMV BLD AUTO: 9.4 FL (ref 9.2–11.8)
POTASSIUM BLD-SCNC: 4.3 MMOL/L (ref 3.5–5.5)
POTASSIUM SERPL-SCNC: 4.5 MMOL/L (ref 3.5–5.5)
PROT SERPL-MCNC: 8.7 G/DL (ref 6.4–8.2)
PROT UR STRIP-MCNC: NEGATIVE MG/DL
RBC # BLD AUTO: 4.36 M/UL (ref 4.7–5.5)
SODIUM BLD-SCNC: 136 MMOL/L (ref 136–145)
SODIUM SERPL-SCNC: 133 MMOL/L (ref 136–145)
SP GR UR REFRACTOMETRY: 1.03 (ref 1–1.03)
UROBILINOGEN UR QL STRIP.AUTO: 1 EU/DL (ref 0.2–1)
WBC # BLD AUTO: 12.7 K/UL (ref 4.6–13.2)

## 2019-07-23 PROCEDURE — 83605 ASSAY OF LACTIC ACID: CPT

## 2019-07-23 PROCEDURE — 80047 BASIC METABLC PNL IONIZED CA: CPT

## 2019-07-23 PROCEDURE — 85025 COMPLETE CBC W/AUTO DIFF WBC: CPT

## 2019-07-23 PROCEDURE — 80053 COMPREHEN METABOLIC PANEL: CPT

## 2019-07-23 PROCEDURE — 74011250637 HC RX REV CODE- 250/637: Performed by: EMERGENCY MEDICINE

## 2019-07-23 PROCEDURE — 81003 URINALYSIS AUTO W/O SCOPE: CPT

## 2019-07-23 PROCEDURE — 87040 BLOOD CULTURE FOR BACTERIA: CPT

## 2019-07-23 PROCEDURE — 74011250636 HC RX REV CODE- 250/636: Performed by: EMERGENCY MEDICINE

## 2019-07-23 RX ORDER — AMOXICILLIN AND CLAVULANATE POTASSIUM 875; 125 MG/1; MG/1
1 TABLET, FILM COATED ORAL
Status: COMPLETED | OUTPATIENT
Start: 2019-07-23 | End: 2019-07-23

## 2019-07-23 RX ORDER — SULFAMETHOXAZOLE AND TRIMETHOPRIM 800; 160 MG/1; MG/1
1 TABLET ORAL
Status: COMPLETED | OUTPATIENT
Start: 2019-07-23 | End: 2019-07-23

## 2019-07-23 RX ORDER — AMOXICILLIN AND CLAVULANATE POTASSIUM 875; 125 MG/1; MG/1
1 TABLET, FILM COATED ORAL 2 TIMES DAILY
Qty: 20 TAB | Refills: 0 | Status: SHIPPED | OUTPATIENT
Start: 2019-07-23 | End: 2019-08-06

## 2019-07-23 RX ORDER — SULFAMETHOXAZOLE AND TRIMETHOPRIM 800; 160 MG/1; MG/1
1 TABLET ORAL 2 TIMES DAILY
Qty: 28 TAB | Refills: 0 | Status: SHIPPED | OUTPATIENT
Start: 2019-07-23 | End: 2019-08-06

## 2019-07-23 RX ADMIN — SODIUM CHLORIDE 1000 ML: 900 INJECTION, SOLUTION INTRAVENOUS at 02:00

## 2019-07-23 RX ADMIN — SODIUM CHLORIDE 1000 ML: 900 INJECTION, SOLUTION INTRAVENOUS at 00:37

## 2019-07-23 RX ADMIN — AMOXICILLIN AND CLAVULANATE POTASSIUM 1 TABLET: 875; 125 TABLET, FILM COATED ORAL at 02:00

## 2019-07-23 RX ADMIN — SULFAMETHOXAZOLE AND TRIMETHOPRIM 1 TABLET: 800; 160 TABLET ORAL at 02:00

## 2019-07-23 NOTE — PROGRESS NOTES
ED Discharge Follow-Up    Date/Time:     2019 5:32 PM    Patient presented to [facility]  ED on 19 and was diagnosed with Diabetic ulcer of foot associated with type 2 diabetes mellitus, with muscle involvement without evidence of necrosis. Nurse Navigator(JILL) contacted the  patient  by telephone to perform post ED discharge assessment. Verified name and  with patient as identifiers. Provided introduction to self, and explanation of the Nurse Navigator role. Patient reported assessment: States he is feeling well; Dressing c/d/i. Patient changing dressing every 2-3 days. Patient has not been able to  antibiotics d/t financial constraint. BG in ED over 500. Patient taking insulin as prescribed but still unable to check BG as he cannot afford test strips for his machine. Still plans to buy Rel[employer] On meter for cheaper strips. Checked with [facility], Care manager of [address] [place] [address] ED, to get indigent antibiotics. Per [facility], he can receive assistance with medications once per year. Patient notified and with patient's permission, texted [employer] coupons to pt's phone for Bactrim and Augmentin. Intended to discuss diet with patient but he is in the middle of moving. Will follow up with patient next Tues or Wed when he is off from work. Medication(s):   New Medications at Discharge: Bactrim, Augmentin  Changed Medications at Discharge: none  Discontinued Medications at Discharge: none    There were barriers to obtaining medications identified at this time. Reviewed discharge instructions and red flags with  patient who voiced understanding. Patient given an opportunity to ask questions and does not have any further questions or concerns at this time. The patient agrees to contact the PCP office for questions related to their healthcare. Offered follow up appointment with PCP: no   [facility] follow up appointment(s): No future appointments.      Goals      Patient will follow up with Podiatry and PCP.      6/28/19- Scheduled patient to follow up with PCP. Pt still has not followed up with Podiatry. States he will try to f/u next Wed.

## 2019-07-23 NOTE — ED NOTES
Patient removed his own toe-nail, R side big toe. MD aware. Large wound to R side big toe, white fleshy exudate and foul-smell, scant light brown drainage. Large wound to L pad of foot, down to muscle tissue, white fleshy exudate and foul-smell, scant light brown drainage and blood.   Patient's toe and wounds dressed per provider request.

## 2019-07-23 NOTE — ED PROVIDER NOTES
Bijal Jiang is a 37 y.o. Male insulin-dependent diabetes with complaints of wanting evaluation of his bilateral foot ulcers. Patient states they have been draining any continues to take dressing daily. He has no fever. No vomiting or diarrhea. He has been keeping up with his sugars. Patient has a prior history of amputation before in the past.  Patient is due to see a podiatrist in the middle of August.  Symptoms are worse with walking. The history is provided by the patient and medical records.         Past Medical History:   Diagnosis Date    Diabetes ([facility] [address] 1998       Past Surgical History:   Procedure Laterality Date    HX AMPUTATION TOE      left and right foot    HX CYST INCISION AND DRAINAGE Right 08/21/2018    right 5th metatarsal (osteomyelitis)         Family History:   Problem Relation Age of Onset    No Known Problems Mother     Heart Attack Father        Social History     Socioeconomic History    Marital status: SINGLE     Spouse name: Not on file    Number of children: Not on file    Years of education: Not on file    Highest education level: Not on file   Occupational History    Not on file   Social Needs    Financial resource strain: Not on file    Food insecurity:     Worry: Not on file     Inability: Not on file    Transportation needs:     Medical: Not on file     Non-medical: Not on file   Tobacco Use    Smoking status: Current Every Day Smoker     Packs/day: 0.50    Smokeless tobacco: Never Used   Substance and Sexual Activity    Alcohol use: No    Drug use: No    Sexual activity: Not on file   Lifestyle    Physical activity:     Days per week: Not on file     Minutes per session: Not on file    Stress: Not on file   Relationships    Social connections:     Talks on phone: Not on file     Gets together: Not on file     Attends [employer] service: Not on file     Active member of club or organization: Not on file     Attends meetings of clubs or organizations: Not on file     Relationship status: Not on file    Intimate partner violence:     Fear of current or ex partner: Not on file     Emotionally abused: Not on file     Physically abused: Not on file     Forced sexual activity: Not on file   Other Topics Concern    Not on file   Social History Narrative    Not on file         ALLERGIES: Patient has no known allergies. Review of Systems   Constitutional: Negative for fever. HENT: Negative for sore throat. Eyes: Negative for visual disturbance. Respiratory: Negative for shortness of breath. Cardiovascular: Negative for chest pain. Gastrointestinal: Negative for abdominal pain. Genitourinary: Negative for difficulty urinating. Musculoskeletal: Negative for gait problem. Skin: Positive for wound. Allergic/Immunologic: Negative for food allergies. Neurological: Negative for syncope. Psychiatric/Behavioral: Negative for sleep disturbance. Vitals:    07/22/19 2232   BP: 144/83   Pulse: 100   Resp: 16   Temp: 99.2 °F (37.3 °C)   SpO2: 100%   Weight: 73.9 kg (163 lb)   Height: 6' 3\" (1.905 m)            Physical Exam   Constitutional: He is oriented to person, place, and time. Non-toxic appearance. He does not appear ill. No distress. HENT:   Head: Normocephalic and atraumatic. Right Ear: External ear normal.   Left Ear: External ear normal.   Nose: Nose normal.   Mouth/Throat: Oropharynx is clear and moist. No oropharyngeal exudate. Eyes: Conjunctivae are normal.   Neck: Normal range of motion. Cardiovascular: Normal rate, regular rhythm, normal heart sounds and intact distal pulses. Pulmonary/Chest: Effort normal and breath sounds normal. No respiratory distress. Abdominal: Soft. There is no tenderness. Musculoskeletal: Normal range of motion. He exhibits no edema. Feet:    Patient with bilateral skin ulcers to on the right and one on the left. One on each side both have some muscle involvement but no bone involvement. There is a slight odor. But there is no significant surrounding cellulitis. Patient does have good pulses intact. Neurological: He is alert and oriented to person, place, and time. Skin: Skin is warm and dry. He is not diaphoretic. Psychiatric: His behavior is normal.   Nursing note and vitals reviewed. MDM       Procedures    Abnormal Labs Reviewed   CBC WITH AUTOMATED DIFF - Abnormal; Notable for the following components:       Result Value    RBC 4.36 (*)     HGB 12.3 (*)     PLATELET 731 (*)     LYMPHOCYTES 18 (*)     MONOCYTES 11 (*)     ABS. NEUTROPHILS 8.8 (*)     ABS. MONOCYTES 1.4 (*)     All other components within normal limits   METABOLIC PANEL, COMPREHENSIVE - Abnormal; Notable for the following components:    Sodium 133 (*)     Chloride 98 (*)     Glucose 267 (*)     Creatinine 1.35 (*)     GFR est non-AA 60 (*)     Alk.  phosphatase 154 (*)     Protein, total 8.7 (*)     Globulin 4.5 (*)     All other components within normal limits   POC CHEM8 - Abnormal; Notable for the following components:    CO2, POC 27 (*)     Glucose,  (*)     Chloride, POC 98 (*)     All other components within normal limits    s  Vitals:  Patient Vitals for the past 12 hrs:   Temp Pulse Resp BP SpO2   07/22/19 2232 99.2 °F (37.3 °C) 100 16 144/83 100 %         Medications ordered:   Medications   sodium chloride 0.9 % bolus infusion 1,000 mL (has no administration in time range)   amoxicillin-clavulanate (AUGMENTIN) 875-125 mg per tablet 1 Tab (has no administration in time range)   trimethoprim-sulfamethoxazole (BACTRIM DS, SEPTRA DS) 160-800 mg per tablet 1 Tab (has no administration in time range)   sodium chloride 0.9 % bolus infusion 1,000 mL (1,000 mL IntraVENous New Bag 7/23/19 0037)         Lab findings:  Recent Results (from the past 12 hour(s))   CBC WITH AUTOMATED DIFF    Collection Time: 07/23/19 12:30 AM   Result Value Ref Range    WBC 12.7 4.6 - 13.2 K/uL    RBC 4.36 (L) 4.70 - 5.50 M/uL HGB 12.3 (L) 13.0 - 16.0 g/dL    HCT 36.2 36.0 - 48.0 %    MCV 83.0 74.0 - 97.0 FL    MCH 28.2 24.0 - 34.0 PG    MCHC 34.0 31.0 - 37.0 g/dL    RDW 14.2 11.6 - 14.5 %    PLATELET 037 (H) 637 - 420 K/uL    MPV 9.4 9.2 - 11.8 FL    NEUTROPHILS 69 40 - 73 %    LYMPHOCYTES 18 (L) 21 - 52 %    MONOCYTES 11 (H) 3 - 10 %    EOSINOPHILS 2 0 - 5 %    BASOPHILS 0 0 - 2 %    ABS. NEUTROPHILS 8.8 (H) 1.8 - 8.0 K/UL    ABS. LYMPHOCYTES 2.3 0.9 - 3.6 K/UL    ABS. MONOCYTES 1.4 (H) 0.05 - 1.2 K/UL    ABS. EOSINOPHILS 0.2 0.0 - 0.4 K/UL    ABS. BASOPHILS 0.0 0.0 - 0.1 K/UL    DF AUTOMATED     METABOLIC PANEL, COMPREHENSIVE    Collection Time: 07/23/19 12:30 AM   Result Value Ref Range    Sodium 133 (L) 136 - 145 mmol/L    Potassium 4.5 3.5 - 5.5 mmol/L    Chloride 98 (L) 100 - 111 mmol/L    CO2 28 21 - 32 mmol/L    Anion gap 7 3.0 - 18 mmol/L    Glucose 267 (H) 74 - 99 mg/dL    BUN 16 7.0 - 18 MG/DL    Creatinine 1.35 (H) 0.6 - 1.3 MG/DL    BUN/Creatinine ratio 12 12 - 20      GFR est AA >60 >60 ml/min/1.73m2    GFR est non-AA 60 (L) >60 ml/min/1.73m2    Calcium 9.4 8.5 - 10.1 MG/DL    Bilirubin, total 0.7 0.2 - 1.0 MG/DL    ALT (SGPT) 38 16 - 61 U/L    AST (SGOT) 22 10 - 38 U/L    Alk.  phosphatase 154 (H) 45 - 117 U/L    Protein, total 8.7 (H) 6.4 - 8.2 g/dL    Albumin 4.2 3.4 - 5.0 g/dL    Globulin 4.5 (H) 2.0 - 4.0 g/dL    A-G Ratio 0.9 0.8 - 1.7     POC LACTIC ACID    Collection Time: 07/23/19 12:36 AM   Result Value Ref Range    Lactic Acid (POC) 1.83 0.40 - 2.00 mmol/L   POC CHEM8    Collection Time: 07/23/19 12:42 AM   Result Value Ref Range    CO2, POC 27 (H) 19 - 24 MMOL/L    Glucose,  (H) 74 - 106 MG/DL    BUN, POC 16 7 - 18 MG/DL    Creatinine, POC 1.1 0.6 - 1.3 MG/DL    GFRAA, POC >60 >60 ml/min/1.73m2    GFRNA, POC >60 >60 ml/min/1.73m2    Sodium,  136 - 145 MMOL/L    Potassium, POC 4.3 3.5 - 5.5 MMOL/L    Calcium, ionized (POC) 1.21 1.12 - 1.32 mmol/L    Chloride, POC 98 (L) 100 - 108 MMOL/L    Anion gap, POC 16 10 - 20      Hematocrit, POC 41 36 - 49 %    Hemoglobin, POC 13.9 12 - 16 G/DL       EKG interpretation by ED Physician:      X-Ray, CT or other radiology findings or impressions:  XR FOOT RT MIN 3 V    (Results Pending)   No signs of any obvious osteomyelitis    Progress notes, Consult notes or additional Procedure notes:   Do not feel patient requires admission or emergent consult. I will place him on antibiotics pending follow-up with podiatry. Patient is very reliable in we will change his dressings daily    I have discussed with patient and/or family/sig other the results, interpretation of any imaging if performed, suspected diagnosis and treatment plan to include instructions regarding the diagnoses listed to which understanding was expressed with all questions answered      Reevaluation of patient:   stable    Disposition:  Diagnosis:   1. Diabetic ulcer of foot associated with type 2 diabetes mellitus, with muscle involvement without evidence of necrosis, unspecified laterality, unspecified part of foot ([facility] [address]        Disposition: home    Follow-up Information     Follow up With Specialties Details Why Contact Info    Follow-up with your podiatry appointment as scheduled. [facility] EMERGENCY DEPT Emergency Medicine  If symptoms worsen [address] [address]  [phone]            Patient's Medications   Start Taking    AMOXICILLIN-CLAVULANATE (AUGMENTIN) 875-125 MG PER TABLET    Take 1 Tab by mouth two (2) times a day for 14 days. TRIMETHOPRIM-SULFAMETHOXAZOLE (BACTRIM DS, SEPTRA DS) 160-800 MG PER TABLET    Take 1 Tab by mouth two (2) times a day for 14 days. Continue Taking    INSULIN GLARGINE (LANTUS) 100 UNIT/ML INJECTION    16 units daily    INSULIN LISPRO (HUMALOG) 100 UNIT/ML INJECTION    Sliding scale tid with meals    INSULIN NPH (NOVOLIN N NPH U-100 INSULIN) 100 UNIT/ML INJECTION    by SubCUTAneous route two (2) times a day.    These Medications have changed    No medications on file   Stop Taking    No medications on file

## 2019-07-23 NOTE — DISCHARGE INSTRUCTIONS
Patient Education        Diabetic Foot Ulcer: Care Instructions  Your Care Instructions  Diabetes can damage the nerve endings and blood vessels in your feet. That means you are less likely to notice when your feet are injured. A small skin problem like a callus, blister, or cracked skin can turn into a larger sore, called a foot ulcer. Foot ulcers form most often on the pad (ball) of the foot or the bottom of the big toe. You can also get them on the top and bottom of each toe. Foot ulcers can get infected. If the infection is severe, then tissue in the foot can die. This is called gangrene. In that case, one or more of the toes, part or all of the foot, and sometimes part of the leg may have to be removed (amputated). Your doctor may have removed the dead tissue and cleaned the ulcer. Your foot wound may be wrapped in a protective bandage. It is very important to keep your weight off your injured foot. After a foot ulcer has formed, it will not heal as long as you keep putting weight on the area. Always get early treatment for foot problems. A minor irritation can lead to a major problem if it's not taken care of soon. Follow-up care is a key part of your treatment and safety. Be sure to make and go to all appointments, and call your doctor if you are having problems. It's also a good idea to know your test results and keep a list of the medicines you take. How can you care for yourself at home? · Follow your doctor's instructions about keeping pressure off the foot ulcer. You may need to use crutches or a wheelchair. Or you may wear a cast or a walking boot. · Follow your doctor's instructions on how to clean the ulcer and change the bandage. · If your doctor prescribed antibiotics, take them as directed. Do not stop taking them just because you feel better. You need to take the full course of antibiotics.   To prevent foot ulcers  · Keep your blood sugar close to normal by watching what and how much you eat. Track your blood sugar, take medicines if prescribed, and get regular exercise. · Do not smoke. Smoking affects blood flow and can make foot problems worse. If you need help quitting, talk to your doctor about stop-smoking programs and medicines. These can increase your chances of quitting for good. · Do not go barefoot. Protect your feet by wearing shoes that fit well. Choose shoes that are made of materials that are flexible and breathable, such as leather or cloth. · Inspect your feet daily for blisters, cuts, cracks, or sores. If you can't see well, use a mirror or have someone help you. · Have your doctor check your feet during each visit. If you have a foot problem, see your doctor. Do not try to treat your foot problem on your own. Home remedies or treatments that you can buy without a prescription (such as corn removers) can be harmful. When should you call for help? Call your doctor now or seek immediate medical care if:    · You have symptoms of infection, such as:  ? Increased pain, swelling, warmth, or redness. ? Red streaks leading from the area. ? Pus draining from the area. ? A fever.    Watch closely for changes in your health, and be sure to contact your doctor if:    · You have a new problem with your feet, such as:  ? A new sore or ulcer. ? A break in the skin that is not healing after several days. ? Bleeding corns or calluses. ? An ingrown toenail.     · You do not get better as expected. Where can you learn more? Go to http://london-mckenna.info/. Enter T131 in the search box to learn more about \"Diabetic Foot Ulcer: Care Instructions. \"  Current as of: July 25, 2018  Content Version: 12.1  © 3505-3489 [employer]. Care instructions adapted under license by [employer] (which disclaims liability or warranty for this information).  If you have questions about a medical condition or this instruction, always ask your healthcare professional. [address] any warranty or liability for your use of this information.

## 2019-07-23 NOTE — ED TRIAGE NOTES
Pt ambulatory into triage for c/o multiple ulcers on both feet that have been there for months, noted strong odor in triage. Right foot: ulcer on bottom of foot and on 1st toe  Left foot: large ulcer on 1st toe. Pt BGL >500mg/dL in triage.

## 2019-07-23 NOTE — ED NOTES
Discharge information reviewed with patient, patient verbalized understanding. Paperwork signed, and patient in no distress.

## 2019-07-25 NOTE — TELEPHONE ENCOUNTER
Received Lantus  Vials from [employer] / [employer] patient assistance program. Order routed to provider and letter mailed to patient

## 2019-07-25 NOTE — LETTER
7/25/2019 1:41 PM 
 
Mr. Jackie Orozco 
 [place] [phone] Thank you for participating in the [facility] Patient Assistance Program. 
 
This is notification that your item(s) was delivered to us. Please  your item(s) between 11:00 am and 1:00 pm, at one of our [place] sites as soon as possible When you arrive, you will need to register inside as a \"nurse visit\" to  your medication. Notify the registrar that you are there to  medication and they will register you as soon as possible. There may be a short wait but we try to make the process as fast as possible. It is important to see you regularly to make sure your insulin is at the correct dosage. If it has been more than 3 months since you saw the doctor, please come early and plan to stay for an office visit on the day you  your medicine. If you fail to follow-up for regular appointments, the [facility] may discontinue providing your medication. To see when the [address] will be in your neighborhood, go to 
[email or web address] 
or call [phone], select your language (1 for english or 2 for Fijian), and then option 2. Sincerely, Fan Galvan MD

## 2019-07-25 NOTE — LETTER
7/25/2019 1:43 PM 
 
Mr. Emily Dye 
 [place] [phone] Thank you for participating in the [address] [place] [address] Patient Assistance Program. 
 
This is notification that your item(s) was delivered to us. Please  your item(s) between 11:00 am and 1:00 pm, at one of our [facility] sites as soon as possible When you arrive, you will need to register inside as a \"nurse visit\" to  your medication. Notify the registrar that you are there to  medication and they will register you as soon as possible. There may be a short wait but we try to make the process as fast as possible. It is important to see you regularly to make sure your insulin is at the correct dosage. If it has been more than 3 months since you saw the doctor, please come early and plan to stay for an office visit on the day you  your medicine. If you fail to follow-up for regular appointments, the [address] [place] [address] may discontinue providing your medication. To see when the [address] will be in your neighborhood, go to 
[email or web address] 
or call [phone], select your language (1 for english or 2 for Burundian), and then option 2. Sincerely, Tessa Branch MD

## 2019-07-25 NOTE — LETTER
7/25/2019 1:32 PM 
 
Mr. Thelma Phillips 
 [phone] Discharge instructions reviewed with patient Medication list and understanding of medications reviewed with patient. OTC and herbal medications reviewed and added to med list if applicable Barriers to adherence assessed. Guidance given regarding new medications this visit, including reason for taking this medicine, and common side effects. AVS given to patient. Explained to patient. Patient expressed understanding. Sincerely, Tracey Delgado MD

## 2019-07-26 RX ORDER — INSULIN GLARGINE 100 [IU]/ML
INJECTION, SOLUTION SUBCUTANEOUS
Qty: 4 VIAL | Refills: 0 | Status: SHIPPED | COMMUNITY
Start: 2019-07-26 | End: 2019-09-06 | Stop reason: DRUGHIGH

## 2019-07-26 NOTE — TELEPHONE ENCOUNTER
Med list and chart notes reviewed.   Pharmacy Assistance Medication approved for pickup.    lantus 16 units daily

## 2019-07-29 LAB
BACTERIA SPEC CULT: NORMAL
BACTERIA SPEC CULT: NORMAL
SERVICE CMNT-IMP: NORMAL
SERVICE CMNT-IMP: NORMAL

## 2019-07-31 ENCOUNTER — PATIENT OUTREACH (OUTPATIENT)
Dept: FAMILY MEDICINE CLINIC | Age: 34
End: 2019-07-31

## 2019-08-06 NOTE — PROGRESS NOTES
Follow up Chronic Condition    Focus: Diabetes    Patient reports he is feeling well; denies fever, chills. Reports right ankle is swollen due to extra walking since he has moved. States it takes him 2 hours to walk to work. Patient is currently living in a hotel. States all his money goes toward hotel fees. He has not been able to  antibiotics and cannot afford bus fare. Changing dressing to foot every other day. States he thinks the ulcer has closed some but unsure if it is just from the swelling. Still without a glucometer. Discussed patient's diet. 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 States he does not have left over money to afford shopping at a grocery store. States when he does eat, he will eat large portions. Drinks mostly water, but also no sugar added fruit juice and the occasional diet soda. Advised that no sugar added fruit juice is still high in natural sugars. Advised drinking no more than 4 oz and diluting with water. Discussed the difference between starchy and non starchy vegetables and the Healthy Plate Method. Notified patient that his PAP Lantus is ready for . Also notified we have a glucometer for him. Patient states the [facility] is the closest clinic location from his hotel but he works when we are there on Thursdays. Advised he can come to [facility] to  his meds and supplies. Patient will contact me to arrange. Care plan developed and mailed to patient along with \"Eat Right when Money's Tight\", starchy and non starchy vegetables. Patient has my number if questions arise.

## 2019-08-07 NOTE — PATIENT INSTRUCTIONS
Diabetes Care Plan Goal: Blood sugar goal before a meal should be below 130mg/dl. Blood sugar goal two hours after a meal should be below 180mg/dl. 1.  glucometer and Lantus from the [address] [place] [address]. 
2. Begin monitoring blood sugar 3 times a day. 3. Record your blood sugar readings on your logs. 4. Review Eat Right When Money is Tight and starchy vs non starchy vegetables. 5.Take all your medication as prescribed. Call us when you need refills. 6. Return for reevaluation October 2019 and every 3 months to monitor your Diabetes. 7. Bring your blood sugar logs and your medication bottle at each visit with us. 8. Contact me if you have any questions or concerns @ [phone] Diabetes increases your risk for many serious health problems. The good news? With the correct treatment and recommended lifestyle changes (eating healthy, exercising and taking your medication), many people with diabetes are able to prevent or delay the onset of complications. Next re-evaluation 8/13/19. 32.9

## 2019-08-13 ENCOUNTER — PATIENT OUTREACH (OUTPATIENT)
Dept: FAMILY MEDICINE CLINIC | Age: 34
End: 2019-08-13

## 2019-08-14 NOTE — PROGRESS NOTES
Follow up Chronic Condition    Focus: Diabetic foot ulcer, Diabetes supplies    Patient reports the swelling has gone down in his foot. Reports no change in ulcer. He is still unable to  antibiotics that were prescribed by the ED. States now he can't find the script but can't afford it anyway. Attempted to update patient's address as he is now in a hotel and will not be returning to previous address. Patient states he cannot received mail at the hotel until he has been there for 30 days. States he will continue to check his old address for mail until then. Will give pt mail from previous encounter when he picks up DM supplies. Patient will try to  insulin, mail, and glucometer at [facility] on 8/21/19. Patient will call me the day before to confirm. Patient has my number if questions arise.

## 2019-08-20 ENCOUNTER — PATIENT OUTREACH (OUTPATIENT)
Dept: FAMILY MEDICINE CLINIC | Age: 34
End: 2019-08-20

## 2019-08-29 NOTE — PROGRESS NOTES
Patient called to report he is currently admitted at [facility] and awaiting surgery to repair a closed fracture of left tibial plateau. Patient will call back upon discharge.

## 2019-09-06 ENCOUNTER — PATIENT OUTREACH (OUTPATIENT)
Dept: FAMILY MEDICINE CLINIC | Age: 34
End: 2019-09-06

## 2019-09-06 RX ORDER — INSULIN LISPRO 100 [IU]/ML
10 INJECTION, SOLUTION INTRAVENOUS; SUBCUTANEOUS
COMMUNITY
Start: 2019-09-04

## 2019-09-06 RX ORDER — AMOXICILLIN AND CLAVULANATE POTASSIUM 875; 125 MG/1; MG/1
1 TABLET, FILM COATED ORAL EVERY 12 HOURS
COMMUNITY
Start: 2019-09-04 | End: 2019-09-14

## 2019-09-06 RX ORDER — ACETAMINOPHEN 500 MG
1000 TABLET ORAL EVERY 8 HOURS
COMMUNITY
Start: 2019-09-05 | End: 2022-06-20

## 2019-09-06 RX ORDER — ASCORBIC ACID 500 MG
500 TABLET ORAL 2 TIMES DAILY
COMMUNITY
Start: 2019-09-05 | End: 2022-06-20

## 2019-09-06 RX ORDER — ENOXAPARIN SODIUM 100 MG/ML
40 INJECTION SUBCUTANEOUS EVERY 24 HOURS
COMMUNITY
Start: 2019-09-05 | End: 2019-09-26

## 2019-09-06 RX ORDER — OXYCODONE HYDROCHLORIDE 5 MG/1
5-10 TABLET ORAL
COMMUNITY
Start: 2019-09-05 | End: 2022-06-20

## 2019-09-06 RX ORDER — NALOXONE HYDROCHLORIDE 4 MG/.1ML
4 SPRAY NASAL
COMMUNITY
Start: 2019-09-05 | End: 2022-06-20

## 2019-09-06 RX ORDER — GABAPENTIN 300 MG/1
300 CAPSULE ORAL 3 TIMES DAILY
COMMUNITY
Start: 2019-09-05 | End: 2022-06-20

## 2019-09-06 RX ORDER — INSULIN GLARGINE 100 [IU]/ML
35 INJECTION, SOLUTION SUBCUTANEOUS DAILY
COMMUNITY
Start: 2019-09-04 | End: 2022-06-20 | Stop reason: SDUPTHER

## 2019-09-06 NOTE — PROGRESS NOTES
Hospital Discharge Follow-Up      Date/Time:  2019 4:31 PM    Patient was admitted to [facility] on 19 and discharged on 19 for Closed fracture of proximal end of left fibula. The physician discharge summary was available at the time of outreach. Patient was contacted within 1 business days of discharge. Top Challenges reviewed with the provider   Patient declined surgical intervention for a right transmetatarsal amputation needed due to osteomyelitis. He plans to follow up with podiatry as outpatient. Method of communication with provider :phone    Inpatient RRAT score: unknown  Was this a readmission? no     Nurse Navigator (NN) contacted the patient by telephone to perform post hospital discharge assessment. Verified name and  with patient as identifiers. Provided introduction to self, and explanation of the Nurse Navigator role. Patient reports he is feeling well. Reports leg around surgical site is slightly swollen but denies pain. Reports dressing to left leg c/d/i. Pt is doing daily range of motion exercises at home. He understands he is to be NWB to LLE and PWB to right heel in post op shoe. His ex girlfriend moved back from [facility] to help care him. He will be out of work for 3 months. Will be completing [employer] paperwork to maintain his job. He is using a walker but would prefer crutches. He was offered a glucometer to take home from the hospital but declined d/t he could not afford the test strips for that particular meter. Patient uncertain if he applied for Medicaid while in the hospital. States someone discussed Medicaid with him and collected information from him to apply. He was told they would get back with him prior to discharge but states no one came back. During admission, patient declined surgical intervention for a right transmetatarsal amputation needed due to osteomyelitis.  Pt reports he was just ready to leave the hospital and didn't want to stay longer. Advised patient to call [facility] Monday morning to check status of Medicaid application. Advised if [facility] is closer for him, he could transfer primary care to them as well. Pt states he prefers to remain with the [address] [place] [address]. Stressed importance that he follow up with podiatry to address osteomyelitis. Reviewed discharge instructions and red flags with patient who verbalized understanding. Patient given an opportunity to ask questions and does not have any further questions or concerns at this time. The patient agrees to contact the PCP office for questions related to their healthcare. NN provided contact information for future reference. Disease Specific:   N/A    Summary of patient's top problems:  1. Uncontrolled diabetes with no glucometer  2. Osteomyelitis  3. No transportation    Home Health orders at discharge: none    Durable Medical Equipment ordered/company: [employer]  Durable Medical Equipment received: 3 in 1 commode, shower chair, walker    Barriers to care? financial, lack of knowledge about disease, medication management, PCP relationship, support system, transportation    Advance Care Planning:   Does patient have an Advance Directive:  not on file     Medication(s):   New Medications at Discharge: Augmentin, vit C, multivitamin, gabapentin, enoxaparin, oxycodone, acetaminophen, Narcan  Changed Medications at Discharge: Lantus, Humalog  Discontinued Medications at Discharge: none    Medication reconciliation was performed with patient, who verbalizes understanding of administration of home medications. There were no barriers to obtaining medications identified at this time. Referral to Pharm D needed: no     Current Outpatient Medications   Medication Sig    amoxicillin-clavulanate (AUGMENTIN) 875-125 mg per tablet Take 1 Tab by mouth every twelve (12) hours every twelve (12) hours.  ascorbic acid, vitamin C, (VITAMIN C) 500 mg tablet Take 500 mg by mouth two (2) times a day.  gabapentin (NEURONTIN) 300 mg capsule Take 300 mg by mouth three (3) times daily.  therapeutic multivitamin-minerals (THERAGRAN-M) tablet Take 1 Tab by mouth daily.  enoxaparin (LOVENOX) 40 mg/0.4 mL 40 mg by SubCUTAneous route every twenty-four (24) hours.  acetaminophen (TYLENOL) 500 mg tablet Take 1,000 mg by mouth every eight (8) hours.  oxyCODONE IR (ROXICODONE) 5 mg immediate release tablet Take 5-10 mg by mouth every four (4) hours as needed.  insulin glargine (LANTUS) 100 unit/mL injection 35 Units by SubCUTAneous route daily.  insulin lispro (HUMALOG) 100 unit/mL injection 10 Units by SubCUTAneous route three (3) times daily (with meals).  naloxone (NARCAN) 4 mg/actuation nasal spray 4 mg. No current facility-administered medications for this visit. Medications Discontinued During This Encounter   Medication Reason    insulin glargine (LANTUS) 100 unit/mL injection Dose Adjustment    insulin lispro (HUMALOG) 100 unit/mL injection Dose Adjustment       [facility] follow up appointment(s): Patient will try to f/u with PCP 9/19   Non-[facility] follow up appointment(s): none; Patient will call [facility] on Monday to schedule ortho appt and ask if they have a podiatrist.       Goals      Patient will follow up with Podiatry, Ortho and PCP.      6/28/19- Scheduled patient to follow up with PCP. Pt still has not followed up with Podiatry. States he will try to f/u next Wed.  9/6/19- Patient will call [facility] on Monday to schedule ortho appt. Pt will try to f/u with PCP 9/19. Pt has a number on his discharge paperwork for a Podiatrist to call for f/u.  Patient will  a new glucometer from the [address] [place] [address] and start monitoring BG.

## 2019-09-10 ENCOUNTER — PATIENT OUTREACH (OUTPATIENT)
Dept: FAMILY MEDICINE CLINIC | Age: 34
End: 2019-09-10

## 2019-09-10 NOTE — PROGRESS NOTES
Transitions of Care follow up for Closed fracture of proximal end of left fibula     Patient reports he called [facility] yesterday to follow up on his Medicaid application and was told that he was denied d/t he makes too much. He did not call to schedule an appt with [facility] orth[facility] yet or look into a podiatrist. Reports he took his dressing off his leg a couple days ago to give it air and apply a new dressing. States the swelling has gone down but did note a blood blister. He did not pop it and will monitor it. No changes with his foot. Advised pt that now that he will be out of work for 3 months he may be eligible for some short term Medicaid. Advised he contact social security as soon as possible to look into short term disability and Medicaid. Also advised to go ahead and apply for financial assistance with [facility] and [employer]. Will mail patient an application to apply for financial assistance with [employer]. Follow up with patient next week. Patient has my number if questions arise.

## 2019-09-24 ENCOUNTER — PATIENT OUTREACH (OUTPATIENT)
Dept: FAMILY MEDICINE CLINIC | Age: 34
End: 2019-09-24

## 2019-09-24 NOTE — PROGRESS NOTES
Follow up: Closed fracture of proximal end of left fibula, Diabetes    Patient had follow up with ortho at the [facility]. Healing well, some swelling, pain is intermittent depending on his BG. States he noticed when his BG is high, he is in pain. Pt was advised to start physical therapy and was given contact info to schedule. Patient is contemplative. He thinks he can continue PT himself at home. Patient bought a glucometer and has been monitoring. BG today 116, 132. He has applied for FMLA, unemployment, Medicaid and SNAP benefits. Patient reports [facility] does have podiatry and he is scheduled for 10/2/19. Reports no change with his ulcer on his foot. Also has follow up with ortho 10/16/19. Pt is hoping once he gets unemployment, he will be able to take a bus for follow up with [facility]. Goals Addressed                 This Visit's Progress     Patient will follow up with Podiatry, Ortho and PCP. On track     6/28/19- Scheduled patient to follow up with PCP. Pt still has not followed up with Podiatry. States he will try to f/u next Wed.  9/6/19- Patient will call [facility] on Monday to schedule ortho appt. Pt will try to f/u with PCP 9/19. Pt has a number on his discharge paperwork for a Podiatrist to call for f/u.  9/24/19- patient attended f/u with ortho and has podiatry appt scheduled for 10/2.  Patient will  a new glucometer from the [employer] and start monitoring BG. On track     9/24/19- Patient bought a glucometer and is monitoring daily. BG today 116, 132          Advised pt of importance of physical therapy in his recovery and encouraged him to follow up with PT as advised by ortho. Follow up with patient after podiatry appt.

## 2019-10-04 ENCOUNTER — PATIENT OUTREACH (OUTPATIENT)
Dept: FAMILY MEDICINE CLINIC | Age: 34
End: 2019-10-04

## 2019-10-04 NOTE — PROGRESS NOTES
Follow up Chronic Condition    Focus: Closed fracture of proximal end of left fibula, Diabetes    Patient reports he had to leave the apartment he was staying at and is now in a hotel until Sunday. He is uncertain what he will do after that. He is considering returning to the [facility]. States his orthopedist wrote in letter for unemployment that he could work a desk job. 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 Medicaid is still pending. SNAP is awaiting pt's last pay statement from his employer. Pt is trying to stay positive and not get too stressed about his situation but states it is hard. He was not able to follow up with podiatry as he had no transportation to get there. He is waiting to schedule with PT when he has money for transportation as well. States he has been doing ROM exercises at home. Swelling is gone and pain is minimal, 3/10. He has no pain medication so it is a bit uncomfortable to sleep at night. He is wearing the ACE wrap during the day and takes it off at night for sleep. States BG's have been pretty good for the most part, . Encouraged patient to return to [facility] if he can as this would provide him with more resources such as a  and bus fare to his drs appts. He will also be able to follow up with [facility] there on 10/17/19. Will follow up with patient in 1 week. Goals      Patient will follow up with Podiatry, Ortho and PCP.      6/28/19- Scheduled patient to follow up with PCP. Pt still has not followed up with Podiatry. States he will try to f/u next Wed.  9/6/19- [facility] will call [facility] on Monday to schedule ortho appt. Pt will try to f/u with PCP 9/19.  Pt has a number on his discharge paperwork for a Podiatrist to call for f/u.  9/24/19- patient attended f/u with ortho and has podiatry appt scheduled for 10/2.  10/4/19- Pt unable to f/u with podiatry d/t lack of transportation        Patient will start monitoring BG.      9/24/19- Patient bought a glucometer and is monitoring daily. BG today 116, 132  10/4/19- Pt reports BG's             Patient has my number if questions arise.

## 2019-10-14 ENCOUNTER — PATIENT OUTREACH (OUTPATIENT)
Dept: FAMILY MEDICINE CLINIC | Age: 34
End: 2019-10-14

## 2019-10-18 NOTE — PROGRESS NOTES
Unable to reach patient by phone. Upon Care Everywhere review, appears patient did not attend f/u with [facility] on 10/16. Will try again to reach next week.

## 2019-10-30 ENCOUNTER — PATIENT OUTREACH (OUTPATIENT)
Dept: FAMILY MEDICINE CLINIC | Age: 34
End: 2019-10-30

## 2019-11-05 NOTE — PROGRESS NOTES
Follow up Chronic Condition    Focus: Closed fracture of proximal end of left fibula, Diabetes    Unable to reach patient by phone. Will try again in 2 weeks. Patient has my number if questions arise.

## 2019-11-22 ENCOUNTER — PATIENT OUTREACH (OUTPATIENT)
Dept: FAMILY MEDICINE CLINIC | Age: 34
End: 2019-11-22

## 2019-11-22 NOTE — PROGRESS NOTES
Follow up Chronic Condition    Focus: Diabetes, Closed fracture of proximal end of left fibula    Patient has not been able to attend any of his f/u appts with PCP or specialists d/t no transportation. He and his girlfriend are currently staying with friends. He ran out of Lantus so is using Novolin N 30 units once daily. Also using Novolin R 10 units plus sliding scale with meals. Reports BG's yesterday 140 and 200. Reports his foot ulcers have closed so he is not apply a dressing anymore. States his knee is doing \"okay\". He cannot fully stretch it. States at the end of this month it will be 12 weeks since his surgery and he will be able to apply pressure on that leg. He continues to do exercises advised by ortho. Still awaiting Medicaid but has not checked his mail in one month. Per provider, called patient back and left message advising him to use Novolin N 15 units BID and adjust according to BG. Will attempt follow up with patient in 2 weeks. Patient has my number if questions arise.

## 2019-12-13 ENCOUNTER — PATIENT OUTREACH (OUTPATIENT)
Dept: FAMILY MEDICINE CLINIC | Age: 34
End: 2019-12-13

## 2019-12-13 NOTE — PROGRESS NOTES
Follow up Chronic Condition    Focus: Diabetes    Patient reports he is now walking on his left leg but unable to fully extend it. Reports all foot ulcers are healed except he still has a hard scab over right great toe amputation. Most BG's have been in the low 200's with one low of 50, d/t not eating. He is using NPH 15 units BID as well as mealtime insulin. Reports he just got Medicaid and has an assigned PCP to establish care with. He plans to do this next week as well as schedule with physical therapy. Pt may lose his Medicaid once he returns to work. He is aware he may return to [facility] as PCP at that time. Patient has graduated from the Disease Specific Care Management  program on 12/13/19. Patient's symptoms are stable at this time. Patient/family has the ability to self-manage. Care management goals have been completed at this time. No further nurse navigator follow up scheduled. Goals Addressed                 This Visit's Progress     COMPLETED: Patient will follow up with Podiatry, Ortho and PCP. On track     6/28/19- Scheduled patient to follow up with PCP. Pt still has not followed up with Podiatry. States he will try to f/u next Wed.  9/6/19- [facility] will call [facility] on Monday to schedule ortho appt. Pt will try to f/u with PCP 9/19. Pt has a number on his discharge paperwork for a Podiatrist to call for f/u.  9/24/19- patient attended f/u with ortho and has podiatry appt scheduled for 10/2.  10/4/19- Pt unable to f/u with podiatry d/t lack of transportation   12/13/19- Pt just got approved for Medicaid and has assigned PCP. Plans to schedule with PT as well.  COMPLETED: Patient will start monitoring BG. On track     9/24/19- Patient bought a glucometer and is monitoring daily. BG today 116, 132  10/4/19- Pt reports BG's   12/13/19- Pt reports BG's in low 200's with one low of 50 d/t not eating.             Pt has nurse navigator's contact information for any further questions, concerns, or needs. Patients upcoming visits:  No future appointments. Patient has my number if questions arise.

## 2022-03-19 PROBLEM — M86.9 OSTEOMYELITIS (HCC): Status: ACTIVE | Noted: 2018-08-16

## 2022-03-19 PROBLEM — M72.6 NECROTIZING FASCIITIS DUE TO MICROORGANISM (HCC): Status: ACTIVE | Noted: 2018-08-19

## 2022-03-19 PROBLEM — I96 GANGRENE OF TOE (HCC): Status: ACTIVE | Noted: 2018-08-19

## 2022-03-20 PROBLEM — E10.9 TYPE 1 DIABETES MELLITUS WITHOUT COMPLICATION (HCC): Status: ACTIVE | Noted: 2018-05-31

## 2022-04-13 ENCOUNTER — NURSE TRIAGE (OUTPATIENT)
Dept: OTHER | Facility: CLINIC | Age: 37
End: 2022-04-13

## 2022-04-13 NOTE — TELEPHONE ENCOUNTER
Received call from Duane rose at [facility] with [employer]. Subjective: Caller states \"My blood sugar was 350 x2 hours ago and now it is 64. \"     Current Symptoms: Type 1 DM. Out of his long acting diabetic insulin. Blood sugar now is 64. Today his insurance is saying that his Omni Pods are not medically necessary and he has been out of them for a couple of months. Thinks has an infection in his R foot on the side. No cut. A hole has started to develop. Opaque pus coming out. No red streaking or redness around the hole. No trouble breathing. Onset: a few months ago; worsening    Associated Symptoms: NA    Pain Severity: 0/10    Temperature: denies fever     What has been tried: Short acting insulin    LMP:  Pregnant:     Recommended disposition: Call Transferred to PCP Now    Care advice provided, patient verbalizes understanding; denies any other questions or concerns; instructed to call back for any new or worsening symptoms. Writer provided warm transfer to Dr. Srikanth Lu  at [facility] for second level triage in order to get him his long acting insulin refilled as he is currently out and having blood sugar issues    Attention Provider: Thank you for allowing me to participate in the care of your patient. The patient was connected to triage in response to information provided to the [facility]. Please do not respond through this encounter as the response is not directed to a shared pool.     Reason for Disposition   Caller has URGENT medication or insulin pump question and triager unable to answer question    Protocols used: DIABETES - LOW BLOOD SUGAR-ADULT-OH

## 2022-06-20 ENCOUNTER — OFFICE VISIT (OUTPATIENT)
Dept: INTERNAL MEDICINE CLINIC | Age: 37
End: 2022-06-20
Payer: MEDICAID

## 2022-06-20 ENCOUNTER — HOSPITAL ENCOUNTER (OUTPATIENT)
Dept: LAB | Age: 37
Discharge: HOME | End: 2022-06-20
Payer: MEDICAID

## 2022-06-20 VITALS
HEIGHT: 75 IN | OXYGEN SATURATION: 98 % | SYSTOLIC BLOOD PRESSURE: 111 MMHG | RESPIRATION RATE: 20 BRPM | BODY MASS INDEX: 21.14 KG/M2 | TEMPERATURE: 96.9 F | HEART RATE: 95 BPM | WEIGHT: 170 LBS | DIASTOLIC BLOOD PRESSURE: 71 MMHG

## 2022-06-20 DIAGNOSIS — Z13.0 SCREENING, ANEMIA, DEFICIENCY, IRON: ICD-10-CM

## 2022-06-20 DIAGNOSIS — E10.9 TYPE 1 DIABETES MELLITUS WITHOUT COMPLICATION (HCC): ICD-10-CM

## 2022-06-20 DIAGNOSIS — Z71.6 ENCOUNTER FOR TOBACCO USE CESSATION COUNSELING: ICD-10-CM

## 2022-06-20 DIAGNOSIS — Z76.89 ESTABLISHING CARE WITH NEW DOCTOR, ENCOUNTER FOR: ICD-10-CM

## 2022-06-20 DIAGNOSIS — Z11.59 ENCOUNTER FOR HEPATITIS C SCREENING TEST FOR LOW RISK PATIENT: ICD-10-CM

## 2022-06-20 DIAGNOSIS — F17.200 TOBACCO USE DISORDER: ICD-10-CM

## 2022-06-20 DIAGNOSIS — Z13.6 SCREENING FOR ISCHEMIC HEART DISEASE: ICD-10-CM

## 2022-06-20 DIAGNOSIS — H61.21 IMPACTED CERUMEN OF RIGHT EAR: ICD-10-CM

## 2022-06-20 DIAGNOSIS — E10.9 TYPE 1 DIABETES MELLITUS WITHOUT COMPLICATION (HCC): Primary | ICD-10-CM

## 2022-06-20 LAB
ALBUMIN SERPL-MCNC: 3.6 G/DL (ref 3.4–5)
ALBUMIN/GLOB SERPL: 1 {RATIO} (ref 0.8–1.7)
ALP SERPL-CCNC: 124 U/L (ref 45–117)
ALT SERPL-CCNC: 27 U/L (ref 16–61)
ANION GAP SERPL CALC-SCNC: 6 MMOL/L (ref 3–18)
AST SERPL-CCNC: 22 U/L (ref 10–38)
BASOPHILS # BLD: 0.1 K/UL (ref 0–0.1)
BASOPHILS NFR BLD: 1 % (ref 0–2)
BILIRUB SERPL-MCNC: 0.4 MG/DL (ref 0.2–1)
BUN SERPL-MCNC: 12 MG/DL (ref 7–18)
BUN/CREAT SERPL: 13 (ref 12–20)
CALCIUM SERPL-MCNC: 9.2 MG/DL (ref 8.5–10.1)
CHLORIDE SERPL-SCNC: 108 MMOL/L (ref 100–111)
CHOLEST SERPL-MCNC: 149 MG/DL
CO2 SERPL-SCNC: 25 MMOL/L (ref 21–32)
CREAT SERPL-MCNC: 0.9 MG/DL (ref 0.6–1.3)
CREAT UR-MCNC: 146 MG/DL (ref 30–125)
DIFFERENTIAL METHOD BLD: ABNORMAL
EOSINOPHIL # BLD: 0.3 K/UL (ref 0–0.4)
EOSINOPHIL NFR BLD: 6 % (ref 0–5)
ERYTHROCYTE [DISTWIDTH] IN BLOOD BY AUTOMATED COUNT: 13.3 % (ref 11.6–14.5)
GLOBULIN SER CALC-MCNC: 3.5 G/DL (ref 2–4)
GLUCOSE POC: 187 MG/DL
GLUCOSE SERPL-MCNC: 147 MG/DL (ref 74–99)
HBA1C MFR BLD HPLC: 7.5 %
HCT VFR BLD AUTO: 41.2 % (ref 36–48)
HDLC SERPL-MCNC: 59 MG/DL (ref 40–60)
HDLC SERPL: 2.5 {RATIO} (ref 0–5)
HGB BLD-MCNC: 13.5 G/DL (ref 13–16)
IMM GRANULOCYTES # BLD AUTO: 0 K/UL (ref 0–0.04)
IMM GRANULOCYTES NFR BLD AUTO: 0 % (ref 0–0.5)
LDLC SERPL CALC-MCNC: 69.4 MG/DL (ref 0–100)
LIPID PROFILE,FLP: NORMAL
LYMPHOCYTES # BLD: 2.1 K/UL (ref 0.9–3.6)
LYMPHOCYTES NFR BLD: 39 % (ref 21–52)
MCH RBC QN AUTO: 28.7 PG (ref 24–34)
MCHC RBC AUTO-ENTMCNC: 32.8 G/DL (ref 31–37)
MCV RBC AUTO: 87.7 FL (ref 78–100)
MICROALBUMIN UR-MCNC: 2.2 MG/DL (ref 0–3)
MICROALBUMIN/CREAT UR-RTO: 15 MG/G (ref 0–30)
MONOCYTES # BLD: 0.6 K/UL (ref 0.05–1.2)
MONOCYTES NFR BLD: 11 % (ref 3–10)
NEUTS SEG # BLD: 2.3 K/UL (ref 1.8–8)
NEUTS SEG NFR BLD: 43 % (ref 40–73)
NRBC # BLD: 0 K/UL (ref 0–0.01)
NRBC BLD-RTO: 0 PER 100 WBC
PLATELET # BLD AUTO: 392 K/UL (ref 135–420)
PMV BLD AUTO: 10.7 FL (ref 9.2–11.8)
POTASSIUM SERPL-SCNC: 5.1 MMOL/L (ref 3.5–5.5)
PROT SERPL-MCNC: 7.1 G/DL (ref 6.4–8.2)
RBC # BLD AUTO: 4.7 M/UL (ref 4.35–5.65)
SODIUM SERPL-SCNC: 139 MMOL/L (ref 136–145)
TRIGL SERPL-MCNC: 103 MG/DL (ref ?–150)
VLDLC SERPL CALC-MCNC: 20.6 MG/DL
WBC # BLD AUTO: 5.4 K/UL (ref 4.6–13.2)

## 2022-06-20 PROCEDURE — 3051F HG A1C>EQUAL 7.0%<8.0%: CPT | Performed by: STUDENT IN AN ORGANIZED HEALTH CARE EDUCATION/TRAINING PROGRAM

## 2022-06-20 PROCEDURE — 82962 GLUCOSE BLOOD TEST: CPT | Performed by: STUDENT IN AN ORGANIZED HEALTH CARE EDUCATION/TRAINING PROGRAM

## 2022-06-20 PROCEDURE — 99406 BEHAV CHNG SMOKING 3-10 MIN: CPT | Performed by: STUDENT IN AN ORGANIZED HEALTH CARE EDUCATION/TRAINING PROGRAM

## 2022-06-20 PROCEDURE — 82043 UR ALBUMIN QUANTITATIVE: CPT

## 2022-06-20 PROCEDURE — 36415 COLL VENOUS BLD VENIPUNCTURE: CPT

## 2022-06-20 PROCEDURE — 80053 COMPREHEN METABOLIC PANEL: CPT

## 2022-06-20 PROCEDURE — 83036 HEMOGLOBIN GLYCOSYLATED A1C: CPT | Performed by: STUDENT IN AN ORGANIZED HEALTH CARE EDUCATION/TRAINING PROGRAM

## 2022-06-20 PROCEDURE — 86803 HEPATITIS C AB TEST: CPT

## 2022-06-20 PROCEDURE — 99204 OFFICE O/P NEW MOD 45 MIN: CPT | Performed by: STUDENT IN AN ORGANIZED HEALTH CARE EDUCATION/TRAINING PROGRAM

## 2022-06-20 PROCEDURE — 85025 COMPLETE CBC W/AUTO DIFF WBC: CPT

## 2022-06-20 PROCEDURE — 80061 LIPID PANEL: CPT

## 2022-06-20 RX ORDER — FLASH GLUCOSE SENSOR
KIT MISCELLANEOUS
COMMUNITY

## 2022-06-20 RX ORDER — INSULIN GLARGINE 100 [IU]/ML
12 INJECTION, SOLUTION SUBCUTANEOUS DAILY
Qty: 10 ML | Refills: 1 | Status: SHIPPED | OUTPATIENT
Start: 2022-06-20

## 2022-06-20 NOTE — PROGRESS NOTES
HISTORY OF PRESENT ILLNESS  Catracho Hamlin is a 42 y.o. male. New pt here to [facility] care    DM1-Dx at 14yo. Checks BS at home. -380. Was previously using Omni-pod insulin pump and Humalog. Ran out of the pods back in Feb and has only been using the rapid acting. Had an ulcer on right foot one month which has since healed. Has a podiatrist Dr Med Jose. Last time saw eye dr was last yr. Tobacco abuse- Smokes 2-3 black n milds a day  Drinks 3 beers a day after work      Review of Systems   HENT: Negative for hearing loss. Eyes: Negative for blurred vision. Respiratory: Negative for shortness of breath. Cardiovascular: Negative for chest pain and palpitations. Gastrointestinal: Negative for abdominal pain, blood in stool, diarrhea and nausea. Neurological: Negative for dizziness and headaches. /71 (BP 1 Location: Right upper arm, BP Patient Position: Sitting, BP Cuff Size: Adult)   Pulse 95   Temp 96.9 °F (36.1 °C) (Temporal)   Resp 20   Ht 6' 3\" (1.905 m)   Wt 170 lb (77.1 kg)   SpO2 98%   BMI 21.25 kg/m²     Physical Exam  Vitals reviewed. Constitutional:       Appearance: Normal appearance. HENT:      Right Ear: Tympanic membrane normal.      Left Ear: Tympanic membrane normal.      Mouth/Throat:      Comments: Poor dentition  Eyes:      Conjunctiva/sclera: Conjunctivae normal.      Pupils: Pupils are equal, round, and reactive to light. Cardiovascular:      Rate and Rhythm: Normal rate and regular rhythm. Pulses: Normal pulses. Dorsalis pedis pulses are 2+ on the right side and 2+ on the left side. Heart sounds: Normal heart sounds. Pulmonary:      Effort: Pulmonary effort is normal.      Breath sounds: Normal breath sounds. Abdominal:      General: Abdomen is flat. Musculoskeletal:      Right Lower Extremity: (TMA)     Left Lower Extremity: (TMA)  Feet:      Right foot:      Skin integrity: Callus and dry skin present.       Left foot: Skin integrity: Dry skin present. Comments:     Psychiatric:         Mood and Affect: Mood normal.         ASSESSMENT and PLAN    ICD-10-CM ICD-9-CM    1. Type 1 diabetes mellitus without complication (HCC)  L41.1 250.01 AMB POC HEMOGLOBIN A1C      HM DIABETES FOOT EXAM      MICROALBUMIN, UR, RAND W/ MICROALB/CREAT RATIO      METABOLIC PANEL, COMPREHENSIVE      REFERRAL TO ENDOCRINOLOGY      insulin glargine (LANTUS) 100 unit/mL injection      AMB POC GLUCOSE BLOOD, BY GLUCOSE MONITORING DEVICE   2. Tobacco use disorder  F17.200 305.1    3. Impacted cerumen of right ear  H61.21 380.4    4. Encounter for tobacco use cessation counseling  Z71.6 V65.42    5. Encounter for hepatitis C screening test for low risk patient  Z11.59 V73.89 HEPATITIS C AB   6. Screening, anemia, deficiency, iron  Z13.0 V78.0 CBC WITH AUTOMATED DIFF   7. Screening for ischemic heart disease  Z13.6 V81.0 LIPID PANEL   8. Establishing care with new doctor, encounter for  Z76.89 V65.8    POC A1C 7.5 today. POC glu 187. Much improved. Continue Humalog. Rx Lantus 12U daily. Discussed diabetes education including diet exercise and medication management. Continue to check BS. Referral to endocrine to est care and set up omnipod  Will attempt ear flush at next visit. Continue drops daily  Counseled on tobacco cessation, NRT and harm reduction. Discussed at length for at least 5 min   Spent at least 20 min reviewing prior notes, labs & imaging from prior providers   Follow-up and Dispositions    · Return in about 3 months (around 9/20/2022) for DM, cerumen impaction.

## 2022-06-20 NOTE — PROGRESS NOTES
Gómez Rob is a 42 y.o. male (: 1985) presenting to address:    Chief Complaint   Patient presents with    New Patient    Establish Care    Diabetes       Vitals:    22 0829   BP: 111/71   Pulse: 95   Resp: 20   Temp: 96.9 °F (36.1 °C)   TempSrc: Temporal   SpO2: 98%   Weight: 170 lb (77.1 kg)   Height: 6' 3\" (1.905 m)   PainSc:   0 - No pain       Hearing/Vision:   No exam data present    Learning Assessment:   No flowsheet data found. Depression Screening:     3 most recent PHQ Screens 2022   Little interest or pleasure in doing things Not at all   Feeling down, depressed, irritable, or hopeless Not at all   Total Score PHQ 2 0     Fall Risk Assessment:   No flowsheet data found. Abuse Screening:   No flowsheet data found. Coordination of Care Questionaire:     Advanced Directive:   1. Do you have an Advanced Directive? NO    2. Would you like information on Advanced Directives? NO    1. \"Have you been to the ER, urgent care clinic since your last visit? Hospitalized since your last visit? \" No    2. \"Have you seen or consulted any other health care providers outside of the [address] [place] [address] since your last visit? \" No     3. For patients aged 39-70: Has the patient had a colonoscopy? NA - based on age     If the patient is female:    4. For patients aged 41-77: Has the patient had a mammogram within the past 2 years? No    5. For patients aged 21-65: Has the patient had a pap smear?  No

## 2022-06-21 LAB
HCV AB SER IA-ACNC: 0.09 INDEX
HCV AB SERPL QL IA: NEGATIVE
HCV COMMENT,HCGAC: NORMAL

## 2022-07-21 NOTE — PROGRESS NOTES
Diagnosis:   1. Squamous cell carcinoma of right lung (CMS/HCC)       Regimen: Keytruda  Cycle 23/Day1    MD Javier Bean is ordering clinician today.    Vital Signs:   Vitals:    22 1007 22 1008   BP:  (!) 152/77   Pulse:  69   Temp:  97.5 °F (36.4 °C)   Weight: 78.7 kg (173 lb 8 oz)         Allergies:  ALLERGIES:  No Known Allergies     Medications:  The medication list was reviewed. No changes noted.     ECOG:   ECOG [22 1000]   ECOG Performance Status 1       Distress Screening: Is this day one of cycle or a new regimen? No No, patient did not indicate any new concerns. Refer to most recent PHQ2/9 score    Toxicity Assessment: Constitutional  Fatigue: Fatigue relieved by rest  Lymphatics  Edema Limbs:  (right ankle and pedal edema)  Neurology  Tingling: Mild symptoms    Additional Nursing Assessment: In addition to above toxicity assessment, this RN assessed physical condition and lab values.    Pre-Treatment: Patient has valid pre-authorization, Premed orders, including hydration, are verified prior to administration and I have reviewed the following with the patient: Name of chemo drug, duration and route of infusion, Infusion/Drug volume and dose, and reportable infusion-related symptoms.     Treatment: Refer to [facility] and MAR for line assessment and medication administration, Chemotherapy has not ; double checked & verified by two practitioners, Appearance and physical integrity of drugs meets standard of drug monograph; double checked & verified by two practitioners, Rate set on infusion pump is in alignment with ordered rate; double checked & verified by two practitioners, Drugs were administered in proper sequencing, Blood return confirmed before, during and after treatment administered and Infusion pump used for non-vesicant drugs    Post Treatment: Treatment tolerated well; no adverse reaction    Oral Chemotherapy: No    Education: No new instructions needed    Next appointment  Problem: Falls - Risk of  Goal: *Absence of Falls  Document Baljinder Fall Risk and appropriate interventions in the flowsheet. Outcome: Progressing Towards Goal  Fall Risk Interventions:  Mobility Interventions: Communicate number of staff needed for ambulation/transfer         Medication Interventions: Evaluate medications/consider consulting pharmacy    Elimination Interventions: Call light in reach             Problem: Pressure Injury - Risk of  Goal: *Prevention of pressure injury  Document Jonathan Scale and appropriate interventions in the flowsheet.    Outcome: Progressing Towards Goal  Pressure Injury Interventions:  Sensory Interventions: Assess need for specialty bed    Moisture Interventions: Apply protective barrier, creams and emollients    Activity Interventions: PT/OT evaluation    Mobility Interventions: HOB 30 degrees or less    Nutrition Interventions: Document food/fluid/supplement intake scheduled: 08/11/2022  Patient instructed to call the office with any questions or concerns.    Patient Discharged: patient discharged to home per self, ambulatory in stable condition.

## 2022-11-22 ENCOUNTER — TELEPHONE (OUTPATIENT)
Dept: INTERNAL MEDICINE CLINIC | Age: 37
End: 2022-11-22

## 2022-11-22 DIAGNOSIS — E10.9 TYPE 1 DIABETES MELLITUS WITHOUT COMPLICATION (HCC): Primary | ICD-10-CM

## 2022-11-22 NOTE — TELEPHONE ENCOUNTER
Pharmacy sent an alternative request fax for Insulin Glargine 100 Unit/ml. Pharmacy notes: Insurance is asking for a prior aut. No suggested alternatives on the fax.

## 2023-03-06 ENCOUNTER — TELEPHONE (OUTPATIENT)
Facility: CLINIC | Age: 38
End: 2023-03-06

## 2023-03-07 RX ORDER — INSULIN LISPRO 100 [IU]/ML
10 INJECTION, SOLUTION INTRAVENOUS; SUBCUTANEOUS
Qty: 10 ML | Refills: 0 | Status: SHIPPED | OUTPATIENT
Start: 2023-03-07 | End: 2023-04-09

## 2023-03-29 RX ORDER — INSULIN LISPRO 100 [IU]/ML
INJECTION, SOLUTION INTRAVENOUS; SUBCUTANEOUS
Qty: 10 ML | Refills: 0 | OUTPATIENT
Start: 2023-03-29

## 2023-04-09 PROBLEM — I96 GANGRENE OF TOE (HCC): Status: RESOLVED | Noted: 2018-08-19 | Resolved: 2023-04-09

## 2023-04-09 PROBLEM — Z89.429 H/O AMPUTATION OF LESSER TOE (HCC): Status: ACTIVE | Noted: 2023-04-09

## 2023-05-08 RX ORDER — INSULIN LISPRO 100 [IU]/ML
INJECTION, SOLUTION INTRAVENOUS; SUBCUTANEOUS
Qty: 10 ML | Refills: 0 | Status: SHIPPED | OUTPATIENT
Start: 2023-05-08 | End: 2023-05-18 | Stop reason: SDUPTHER

## 2023-05-18 ENCOUNTER — HOSPITAL ENCOUNTER (OUTPATIENT)
Facility: HOSPITAL | Age: 38
Setting detail: SPECIMEN
Discharge: HOME | End: 2023-05-18
Payer: MEDICAID

## 2023-05-18 ENCOUNTER — OFFICE VISIT (OUTPATIENT)
Facility: CLINIC | Age: 38
End: 2023-05-18
Payer: MEDICAID

## 2023-05-18 VITALS
OXYGEN SATURATION: 98 % | TEMPERATURE: 97.7 F | HEART RATE: 106 BPM | BODY MASS INDEX: 19.27 KG/M2 | HEIGHT: 75 IN | WEIGHT: 155 LBS | RESPIRATION RATE: 22 BRPM | DIASTOLIC BLOOD PRESSURE: 59 MMHG | SYSTOLIC BLOOD PRESSURE: 92 MMHG

## 2023-05-18 DIAGNOSIS — I95.9 HYPOTENSION, UNSPECIFIED HYPOTENSION TYPE: ICD-10-CM

## 2023-05-18 DIAGNOSIS — E10.9 TYPE 1 DIABETES MELLITUS WITHOUT COMPLICATIONS (HCC): Primary | ICD-10-CM

## 2023-05-18 DIAGNOSIS — E10.9 TYPE 1 DIABETES MELLITUS WITHOUT COMPLICATIONS (HCC): ICD-10-CM

## 2023-05-18 DIAGNOSIS — L97.509 ULCER OF FOOT, UNSPECIFIED LATERALITY, UNSPECIFIED ULCER STAGE (HCC): ICD-10-CM

## 2023-05-18 DIAGNOSIS — Z71.6 ENCOUNTER FOR COUNSELING FOR TOBACCO USE DISORDER: ICD-10-CM

## 2023-05-18 DIAGNOSIS — F17.200 TOBACCO USE DISORDER: ICD-10-CM

## 2023-05-18 LAB
ALBUMIN SERPL-MCNC: 3.4 G/DL (ref 3.4–5)
ALBUMIN/GLOB SERPL: 0.9 (ref 0.8–1.7)
ALP SERPL-CCNC: 123 U/L (ref 45–117)
ALT SERPL-CCNC: 16 U/L (ref 16–61)
ANION GAP SERPL CALC-SCNC: 6 MMOL/L (ref 3–18)
AST SERPL-CCNC: 10 U/L (ref 10–38)
BILIRUB SERPL-MCNC: 0.7 MG/DL (ref 0.2–1)
BUN SERPL-MCNC: 10 MG/DL (ref 7–18)
BUN/CREAT SERPL: 9 (ref 12–20)
CALCIUM SERPL-MCNC: 9.2 MG/DL (ref 8.5–10.1)
CHLORIDE SERPL-SCNC: 100 MMOL/L (ref 100–111)
CHOLEST SERPL-MCNC: 149 MG/DL
CO2 SERPL-SCNC: 27 MMOL/L (ref 21–32)
CREAT SERPL-MCNC: 1.1 MG/DL (ref 0.6–1.3)
GLOBULIN SER CALC-MCNC: 4 G/DL (ref 2–4)
GLUCOSE SERPL-MCNC: 220 MG/DL (ref 74–99)
HBA1C MFR BLD: 7.8 %
HDLC SERPL-MCNC: 47 MG/DL (ref 40–60)
HDLC SERPL: 3.2 (ref 0–5)
LDLC SERPL CALC-MCNC: 80 MG/DL (ref 0–100)
LIPID PANEL: NORMAL
POTASSIUM SERPL-SCNC: 4.5 MMOL/L (ref 3.5–5.5)
PROT SERPL-MCNC: 7.4 G/DL (ref 6.4–8.2)
SODIUM SERPL-SCNC: 133 MMOL/L (ref 136–145)
TRIGL SERPL-MCNC: 110 MG/DL
VLDLC SERPL CALC-MCNC: 22 MG/DL

## 2023-05-18 PROCEDURE — 80053 COMPREHEN METABOLIC PANEL: CPT

## 2023-05-18 PROCEDURE — 83036 HEMOGLOBIN GLYCOSYLATED A1C: CPT | Performed by: STUDENT IN AN ORGANIZED HEALTH CARE EDUCATION/TRAINING PROGRAM

## 2023-05-18 PROCEDURE — 80061 LIPID PANEL: CPT

## 2023-05-18 PROCEDURE — 99214 OFFICE O/P EST MOD 30 MIN: CPT | Performed by: STUDENT IN AN ORGANIZED HEALTH CARE EDUCATION/TRAINING PROGRAM

## 2023-05-18 PROCEDURE — 36415 COLL VENOUS BLD VENIPUNCTURE: CPT

## 2023-05-18 RX ORDER — INSULIN LISPRO 100 [IU]/ML
INJECTION, SOLUTION INTRAVENOUS; SUBCUTANEOUS
COMMUNITY
Start: 2021-12-13 | End: 2023-05-18

## 2023-05-18 RX ORDER — AMOXICILLIN AND CLAVULANATE POTASSIUM 875; 125 MG/1; MG/1
TABLET, FILM COATED ORAL
COMMUNITY
Start: 2023-05-16

## 2023-05-18 RX ORDER — INSULIN LISPRO 100 [IU]/ML
INJECTION, SOLUTION INTRAVENOUS; SUBCUTANEOUS
Qty: 10 ML | Refills: 0 | Status: SHIPPED | OUTPATIENT
Start: 2023-05-18

## 2023-05-18 RX ORDER — INSULIN GLARGINE 100 [IU]/ML
INJECTION, SOLUTION SUBCUTANEOUS
Qty: 10 ML | Refills: 3 | Status: SHIPPED | OUTPATIENT
Start: 2023-05-18

## 2023-05-18 RX ORDER — INSULIN GLARGINE 100 [IU]/ML
INJECTION, SOLUTION SUBCUTANEOUS
COMMUNITY
Start: 2023-03-31 | End: 2023-05-18 | Stop reason: SDUPTHER

## 2023-05-18 SDOH — ECONOMIC STABILITY: FOOD INSECURITY: WITHIN THE PAST 12 MONTHS, THE FOOD YOU BOUGHT JUST DIDN'T LAST AND YOU DIDN'T HAVE MONEY TO GET MORE.: NEVER TRUE

## 2023-05-18 SDOH — ECONOMIC STABILITY: FOOD INSECURITY: WITHIN THE PAST 12 MONTHS, YOU WORRIED THAT YOUR FOOD WOULD RUN OUT BEFORE YOU GOT MONEY TO BUY MORE.: NEVER TRUE

## 2023-05-18 SDOH — ECONOMIC STABILITY: INCOME INSECURITY: HOW HARD IS IT FOR YOU TO PAY FOR THE VERY BASICS LIKE FOOD, HOUSING, MEDICAL CARE, AND HEATING?: NOT HARD AT ALL

## 2023-05-18 SDOH — ECONOMIC STABILITY: HOUSING INSECURITY
IN THE LAST 12 MONTHS, WAS THERE A TIME WHEN YOU DID NOT HAVE A STEADY PLACE TO SLEEP OR SLEPT IN A SHELTER (INCLUDING NOW)?: NO

## 2023-05-18 ASSESSMENT — PATIENT HEALTH QUESTIONNAIRE - PHQ9
SUM OF ALL RESPONSES TO PHQ QUESTIONS 1-9: 0
2. FEELING DOWN, DEPRESSED OR HOPELESS: 0
SUM OF ALL RESPONSES TO PHQ9 QUESTIONS 1 & 2: 0
SUM OF ALL RESPONSES TO PHQ QUESTIONS 1-9: 0
1. LITTLE INTEREST OR PLEASURE IN DOING THINGS: 0
SUM OF ALL RESPONSES TO PHQ QUESTIONS 1-9: 0
SUM OF ALL RESPONSES TO PHQ QUESTIONS 1-9: 0

## 2023-05-18 ASSESSMENT — ENCOUNTER SYMPTOMS
ABDOMINAL PAIN: 0
SHORTNESS OF BREATH: 0

## 2023-05-18 NOTE — PROGRESS NOTES
HISTORY OF PRESENT ILLNESS  Edna Sutton is a 34 y.o. male presenting today for   Chief Complaint   Patient presents with    Diabetes        T1D- Medication taking: Lantus 16U and Humalog SSI 3-4U. Has been out of humalog and having to use Novolin Last home BS:  Checking BS: 3 times/dayHypoglycemia: Denies any recent episodes  Foot/Eye exam: Has podiatrist and ophthalmologist but hasnt scheduled annual exams. Foot infection -in April sent home on IV abx for 6 weeks. Currently on Augmentin for 1 mo             Review of Systems   Eyes:  Negative for visual disturbance. Respiratory:  Negative for shortness of breath. Cardiovascular:  Negative for chest pain. Gastrointestinal:  Negative for abdominal pain. Neurological:  Negative for headaches. BP (!) 92/59   Pulse (!) 106   Temp 97.7 °F (36.5 °C) (Skin)   Resp 22   Ht 6' 3\" (1.905 m)   Wt 155 lb (70.3 kg)   SpO2 98%   BMI 19.37 kg/m²     Physical Exam  Vitals reviewed. Constitutional:       Appearance: Normal appearance. HENT:      Mouth/Throat:      Comments: MASK  Cardiovascular:      Rate and Rhythm: Normal rate and regular rhythm. Pulses: Normal pulses. Pulmonary:      Effort: Pulmonary effort is normal.      Breath sounds: Normal breath sounds. Psychiatric:         Mood and Affect: Mood normal.       ASSESSMENT and PLAN    ICD-10-CM    1. Type 1 diabetes mellitus without complications (HCC)  W16.3 AMB POC HEMOGLOBIN A1C     LANTUS 100 UNIT/ML injection vial     insulin lispro (HUMALOG) 100 UNIT/ML SOLN injection vial     Comprehensive Metabolic Panel     Lipid Panel      2. Tobacco use disorder  F17.200       3. Ulcer of foot, unspecified laterality, unspecified ulcer stage ([facility] [address]  L97.509       4. Encounter for counseling for tobacco use disorder  Z71.6       5. Hypotension, unspecified hypotension type  I95.9       POC A1C 7.8 today. Continue Lantus and Humalog.  Discussed diabetes education including diet exercise

## 2023-05-18 NOTE — PROGRESS NOTES
Claudine Churchill is a 34 y.o. male (: 1985) presenting to address:    Chief Complaint   Patient presents with    Diabetes       BP (!) 92/59   Pulse (!) 106   Temp 97.7 °F (36.5 °C) (Skin)   Resp 22   Ht 6' 3\" (1.905 m)   Wt 155 lb (70.3 kg)   SpO2 98%   BMI 19.37 kg/m²    No flowsheet data found. Hearing/Vision:   No results found. Learning Assessment:   No flowsheet data found. Depression Screening:   No flowsheet data found. Fall Risk Assessment:   No flowsheet data found. Abuse Screening:   No flowsheet data found. Coordination of Care Questionaire:     Advanced Directive:   1. Do you have an Advanced Directive? No    2. Would you like information on Advanced Directives? No    1. \"Have you been to the ER, urgent care clinic since your last visit? Hospitalized since your last visit? \" No    2. \"Have you seen or consulted any other health care providers outside of the [address] [place] [address] since your last visit? \" yes    3. For patients aged 39-70: Has the patient had a colonoscopy? No    If the patient is female:    4. For patients aged 41-77: Has the patient had a mammogram within the past 2 years? No    5. For patients aged 21-65: Has the patient had a pap smear?  No

## 2023-05-19 NOTE — RESULT ENCOUNTER NOTE
Your labs are stable, your sodium appears to be low but this is a result of the blood sugar being elevated.  Continue you current doses of insulin

## 2023-08-22 ENCOUNTER — OFFICE VISIT (OUTPATIENT)
Facility: CLINIC | Age: 38
End: 2023-08-22
Payer: MEDICAID

## 2023-08-22 VITALS
RESPIRATION RATE: 20 BRPM | BODY MASS INDEX: 19.6 KG/M2 | SYSTOLIC BLOOD PRESSURE: 130 MMHG | WEIGHT: 157.6 LBS | TEMPERATURE: 97.2 F | DIASTOLIC BLOOD PRESSURE: 86 MMHG | OXYGEN SATURATION: 98 % | HEART RATE: 99 BPM | HEIGHT: 75 IN

## 2023-08-22 DIAGNOSIS — L97.529 ULCER OF LEFT FOOT, UNSPECIFIED ULCER STAGE (HCC): ICD-10-CM

## 2023-08-22 DIAGNOSIS — S52.592A OTHER CLOSED FRACTURE OF DISTAL END OF LEFT RADIUS, INITIAL ENCOUNTER: ICD-10-CM

## 2023-08-22 DIAGNOSIS — F17.200 TOBACCO USE DISORDER: ICD-10-CM

## 2023-08-22 DIAGNOSIS — E10.9 TYPE 1 DIABETES MELLITUS WITHOUT COMPLICATIONS (HCC): Primary | ICD-10-CM

## 2023-08-22 DIAGNOSIS — Z71.6 TOBACCO ABUSE COUNSELING: ICD-10-CM

## 2023-08-22 DIAGNOSIS — R79.89 LOW SERUM SODIUM: ICD-10-CM

## 2023-08-22 PROCEDURE — 99214 OFFICE O/P EST MOD 30 MIN: CPT | Performed by: STUDENT IN AN ORGANIZED HEALTH CARE EDUCATION/TRAINING PROGRAM

## 2023-08-22 PROCEDURE — 99406 BEHAV CHNG SMOKING 3-10 MIN: CPT | Performed by: STUDENT IN AN ORGANIZED HEALTH CARE EDUCATION/TRAINING PROGRAM

## 2023-08-22 RX ORDER — INSULIN LISPRO 100 [IU]/ML
INJECTION, SOLUTION INTRAVENOUS; SUBCUTANEOUS
Qty: 10 ML | Refills: 3 | Status: SHIPPED | OUTPATIENT
Start: 2023-08-22

## 2023-08-22 RX ORDER — INSULIN GLARGINE 100 [IU]/ML
16 INJECTION, SOLUTION SUBCUTANEOUS
COMMUNITY
Start: 2023-03-31 | End: 2023-08-22

## 2023-08-22 RX ORDER — AMOXICILLIN 500 MG/1
500 CAPSULE ORAL 2 TIMES DAILY
Qty: 20 CAPSULE | Refills: 0 | Status: SHIPPED | OUTPATIENT
Start: 2023-08-22 | End: 2023-09-01

## 2023-08-22 RX ORDER — INSULIN GLARGINE 100 [IU]/ML
INJECTION, SOLUTION SUBCUTANEOUS
Qty: 10 ML | Refills: 3 | Status: SHIPPED | OUTPATIENT
Start: 2023-08-22

## 2023-08-22 ASSESSMENT — ENCOUNTER SYMPTOMS
ABDOMINAL PAIN: 0
SHORTNESS OF BREATH: 0

## 2023-08-22 ASSESSMENT — PATIENT HEALTH QUESTIONNAIRE - PHQ9
SUM OF ALL RESPONSES TO PHQ QUESTIONS 1-9: 0
2. FEELING DOWN, DEPRESSED OR HOPELESS: 0
1. LITTLE INTEREST OR PLEASURE IN DOING THINGS: 0
SUM OF ALL RESPONSES TO PHQ9 QUESTIONS 1 & 2: 0
SUM OF ALL RESPONSES TO PHQ QUESTIONS 1-9: 0

## 2023-08-22 NOTE — PROGRESS NOTES
1. \"Have you been to the ER, urgent care clinic since your last visit? Hospitalized since your last visit? \" Yes chucho    2. \"Have you seen or consulted any other health care providers outside of the [address] [place] [address] since your last visit? \" No    3. For patients aged 43-73: Has the patient had a colonoscopy / FIT/ Cologuard? NA - based on age      If the patient is female:    4. For patients aged 43-66: Has the patient had a mammogram within the past 2 years? NA - based on age or sex      11. For patients aged 21-65: Has the patient had a pap smear?  No

## 2023-08-23 LAB
BUN SERPL-MCNC: 9 MG/DL (ref 6–24)
BUN/CREAT SERPL: 10 (ref 9–20)
CHLORIDE SERPL-SCNC: 101 MMOL/L (ref 96–106)
CO2 SERPL-SCNC: 20 MMOL/L (ref 20–29)
CREAT SERPL-MCNC: 0.92 MG/DL (ref 0.76–1.27)
EGFRCR SERPLBLD CKD-EPI 2021: 108 ML/MIN/1.73
GLUCOSE SERPL-MCNC: 269 MG/DL (ref 70–99)
HBA1C MFR BLD: 9 % (ref 4.8–5.6)
POTASSIUM SERPL-SCNC: 4.7 MMOL/L (ref 3.5–5.2)
SODIUM SERPL-SCNC: 139 MMOL/L (ref 134–144)

## 2023-08-23 NOTE — RESULT ENCOUNTER NOTE
Your A1C is still pretty elevated and only came down 1 point. I would suggest increasing your Lantus to 18U at night.

## 2023-11-29 ENCOUNTER — OFFICE VISIT (OUTPATIENT)
Facility: CLINIC | Age: 38
End: 2023-11-29
Payer: MEDICAID

## 2023-11-29 VITALS
OXYGEN SATURATION: 98 % | HEIGHT: 75 IN | SYSTOLIC BLOOD PRESSURE: 122 MMHG | DIASTOLIC BLOOD PRESSURE: 80 MMHG | HEART RATE: 92 BPM | RESPIRATION RATE: 18 BRPM | TEMPERATURE: 97.2 F | BODY MASS INDEX: 19.52 KG/M2 | WEIGHT: 157 LBS

## 2023-11-29 DIAGNOSIS — R20.2 NUMBNESS AND TINGLING IN LEFT HAND: ICD-10-CM

## 2023-11-29 DIAGNOSIS — R20.0 NUMBNESS AND TINGLING IN LEFT ARM: ICD-10-CM

## 2023-11-29 DIAGNOSIS — E10.9 TYPE 1 DIABETES MELLITUS WITHOUT COMPLICATIONS (HCC): Primary | ICD-10-CM

## 2023-11-29 DIAGNOSIS — R20.0 NUMBNESS AND TINGLING IN LEFT HAND: ICD-10-CM

## 2023-11-29 DIAGNOSIS — L97.529 ULCER OF LEFT FOOT, UNSPECIFIED ULCER STAGE (HCC): ICD-10-CM

## 2023-11-29 DIAGNOSIS — R20.2 NUMBNESS AND TINGLING IN LEFT ARM: ICD-10-CM

## 2023-11-29 DIAGNOSIS — F17.200 TOBACCO USE DISORDER: ICD-10-CM

## 2023-11-29 DIAGNOSIS — Z71.6 ENCOUNTER FOR COUNSELING FOR TOBACCO USE DISORDER: ICD-10-CM

## 2023-11-29 LAB — HBA1C MFR BLD: 8.7 %

## 2023-11-29 PROCEDURE — 83036 HEMOGLOBIN GLYCOSYLATED A1C: CPT | Performed by: STUDENT IN AN ORGANIZED HEALTH CARE EDUCATION/TRAINING PROGRAM

## 2023-11-29 PROCEDURE — 99214 OFFICE O/P EST MOD 30 MIN: CPT | Performed by: STUDENT IN AN ORGANIZED HEALTH CARE EDUCATION/TRAINING PROGRAM

## 2023-11-29 PROCEDURE — 3052F HG A1C>EQUAL 8.0%<EQUAL 9.0%: CPT | Performed by: STUDENT IN AN ORGANIZED HEALTH CARE EDUCATION/TRAINING PROGRAM

## 2023-11-29 RX ORDER — ACYCLOVIR 400 MG/1
TABLET ORAL
Qty: 2 EACH | Refills: 3 | Status: SHIPPED | OUTPATIENT
Start: 2023-11-29 | End: 2023-11-29

## 2023-11-29 RX ORDER — ACYCLOVIR 400 MG/1
TABLET ORAL
Qty: 1 EACH | Refills: 0 | Status: SHIPPED | OUTPATIENT
Start: 2023-11-29 | End: 2023-11-29

## 2023-11-29 RX ORDER — ACYCLOVIR 400 MG/1
TABLET ORAL
Qty: 2 EACH | Refills: 3 | Status: SHIPPED | OUTPATIENT
Start: 2023-11-29

## 2023-11-29 RX ORDER — ACYCLOVIR 400 MG/1
TABLET ORAL
Qty: 1 EACH | Refills: 0 | Status: SHIPPED | OUTPATIENT
Start: 2023-11-29

## 2023-11-29 RX ORDER — AMOXICILLIN 500 MG/1
500 CAPSULE ORAL 2 TIMES DAILY
Qty: 28 CAPSULE | Refills: 0 | Status: SHIPPED | OUTPATIENT
Start: 2023-11-29 | End: 2023-12-13

## 2023-11-29 ASSESSMENT — ENCOUNTER SYMPTOMS
SHORTNESS OF BREATH: 0
ABDOMINAL PAIN: 0

## 2023-11-29 NOTE — PROGRESS NOTES
1. \"Have you been to the ER, urgent care clinic since your last visit? Hospitalized since your last visit? \"No    2. \"Have you seen or consulted any other health care providers outside of the [address] [place] [address] since your last visit? \" No    3. For patients aged 43-73: Has the patient had a colonoscopy / FIT/ Cologuard? Not applicable      If the patient is female:    4. For patients aged 43-66: Has the patient had a mammogram within the past 2 years? Not applicable      5. For patients aged 21-65: Has the patient had a pap smear?  Not applicable
Blood Gluc Sensor (DEXCOM G7 SENSOR) MISC     DISCONTINUED: Continuous Blood Gluc  ([facility]) RONNI      2. Ulcer of left foot, unspecified ulcer stage (HCC)  L97.529 amoxicillin (AMOXIL) 500 MG capsule      3. Tobacco use disorder  F17.200       4. Numbness and tingling in left arm  R20.0 [address] - Argie Holms, DO, Hand Surgery, [place] ([address] [place] [address])    R20.2       5. Numbness and tingling in left hand  R20.0 [address] - Argie Holms, DO, Hand Surgery, [place] ([address] [place] [address])    R20.2       6. Encounter for counseling for tobacco use disorder  Z71.6       POC A1C 8. today. Continue 8.7. Discussed diabetes education including diet exercise and medication management. Stressed importance of checking BS 3x daily before meals. Currently not adequately controlled with his home blood sugars and would benefit from a continuous glucose monitor. I find this to be medically necessary for the patient in order to gain better glycemic control. The wound is cleansed and dressed. The patient is alerted to watch for any signs of infection (redness, pus, pain, increased swelling or fever) and call if such occurs. Home wound care instructions are provided. Rx amoxicillin 500mg BID for 14 days. Stressed importance of fu with podiatry    Suspect CTS vs arthritis as a result of wrist surgery. Referral to ortho for further evaluation    Counseled on tobacco cessation, NRT and harm reduction. Encouraged to participate in nicotine quit now program. Discussed at length for at least 5 min     Return in about 1 month (around 12/29/2023).

## 2023-12-28 ENCOUNTER — TELEPHONE (OUTPATIENT)
Facility: CLINIC | Age: 38
End: 2023-12-28

## 2024-01-23 DIAGNOSIS — E10.9 TYPE 1 DIABETES MELLITUS WITHOUT COMPLICATIONS (HCC): ICD-10-CM

## 2024-01-23 RX ORDER — INSULIN LISPRO 100 [IU]/ML
INJECTION, SOLUTION INTRAVENOUS; SUBCUTANEOUS
Qty: 10 ML | Refills: 3 | Status: SHIPPED | OUTPATIENT
Start: 2024-01-23

## 2024-01-23 RX ORDER — INSULIN GLARGINE 100 [IU]/ML
INJECTION, SOLUTION SUBCUTANEOUS
Qty: 10 ML | Refills: 3 | Status: SHIPPED | OUTPATIENT
Start: 2024-01-23

## 2024-03-12 ENCOUNTER — TELEPHONE (OUTPATIENT)
Facility: CLINIC | Age: 39
End: 2024-03-12

## 2024-03-12 NOTE — TELEPHONE ENCOUNTER
Care Transitions Initial Follow Up Call    Outreach made within 2 business days of discharge: Yes    Patient: Guevara Tejeda Patient : 1985   MRN: [id number]  Reason for Admission: There are no discharge diagnoses documented for the most recent discharge.  Discharge Date: 19       Spoke with: patient     Discharge department/facility: [facility] Interactive Patient Contact:  Was patient able to fill all prescriptions: Yes  Was patient instructed to bring all medications to the follow-up visit: Yes  Is patient taking all medications as directed in the discharge summary? Yes  Does patient understand their discharge instructions: Yes  Does patient have questions or concerns that need addressed prior to 7-14 day follow up office visit: no    Patient stated he didn't think Dr. Matos was still his PCP because his insurance was canceled.  Patient doesn't want to be seen for a follow up. Due to no insurance.        Follow Up  No future appointments.    MARSHALL MADRID MA

## 2024-11-12 ENCOUNTER — TELEPHONE (OUTPATIENT)
Facility: CLINIC | Age: 39
End: 2024-11-12

## 2024-11-12 NOTE — TELEPHONE ENCOUNTER
Care Transitions Initial Follow Up Call    Outreach made within 2 business days of discharge: Yes    Patient: Guevara Tejeda Patient : 1985   MRN: [id number]  Reason for Admission: There are no discharge diagnoses documented for the most recent discharge.  Discharge Date: 2024      Spoke with: patient     Discharge department/facility: [facility] Interactive Patient Contact:  Was patient able to fill all prescriptions: Yes  Was patient instructed to bring all medications to the follow-up visit: Yes  Is patient taking all medications as directed in the discharge summary? Yes  Does patient understand their discharge instructions: Yes  Does patient have questions or concerns that need addressed prior to 7-14 day follow up office visit: no  I will have front office schedule hospital follow up.    Scheduled appointment with PCP within 7-14 days    Follow Up  No future appointments.    MARSHALL MADRID MA

## (undated) DEVICE — STERILE POLYISOPRENE POWDER-FREE SURGICAL GLOVES: Brand: PROTEXIS

## (undated) DEVICE — U-DRAPE: Brand: CONVERTORS

## (undated) DEVICE — NEEDLE HYPO 25GA L1.5IN BLU POLYPR HUB S STL REG BVL STR

## (undated) DEVICE — (D)SYR 20ML LR LCK 1ML GRAD -- DISC BY MFR  USE ITEM 304149

## (undated) DEVICE — GOWN,SIRUS,FABRNF,XL,20/CS: Brand: MEDLINE

## (undated) DEVICE — KENDALL SCD EXPRESS SLEEVES, KNEE LENGTH, MEDIUM: Brand: KENDALL SCD

## (undated) DEVICE — SYR 10ML CTRL LR LCK NSAF LF --

## (undated) DEVICE — DISPOSABLE TOURNIQUET CUFF SINGLE BLADDER, SINGLE PORT AND QUICK CONNECT CONNECTOR: Brand: COLOR CUFF

## (undated) DEVICE — REM POLYHESIVE ADULT PATIENT RETURN ELECTRODE: Brand: VALLEYLAB

## (undated) DEVICE — 7 FRENCH DRAIN SYSTEM, STERILE: Brand: TLS

## (undated) DEVICE — ABDOMINAL PAD: Brand: DERMACEA

## (undated) DEVICE — NDL PRT INJ NSAF BLNT 18GX1.5 --

## (undated) DEVICE — BLADE RMFG OSC COARSE 25X9MM -- LAWSON OEM ITEM 225903

## (undated) DEVICE — INTENDED FOR TISSUE SEPARATION, AND OTHER PROCEDURES THAT REQUIRE A SHARP SURGICAL BLADE TO PUNCTURE OR CUT.: Brand: BARD-PARKER ® STAINLESS STEEL BLADES

## (undated) DEVICE — HEX-LOCKING BLADE ELECTRODE: Brand: EDGE

## (undated) DEVICE — IODOFORM PACKING STRIP: Brand: CURITY

## (undated) DEVICE — MEDI-VAC NON-CONDUCTIVE SUCTION TUBING 6MM X 6.1M (20 FT.) L: Brand: CARDINAL HEALTH

## (undated) DEVICE — GAUZE SPONGES,12 PLY: Brand: CURITY

## (undated) DEVICE — MEDI-VAC YANKAUER SUCTION HANDLE W/BULBOUS TIP: Brand: CARDINAL HEALTH

## (undated) DEVICE — NEEDLE HYPO 25GA L1.5IN BVL ORIENTED ECLIPSE

## (undated) DEVICE — SOLUTION IV 1000ML 0.9% SOD CHL

## (undated) DEVICE — SUTURE ETHLN SZ 2-0 L18IN NONABSORBABLE BLK L26MM PS 3/8 585H

## (undated) DEVICE — SUTURE VCRL SZ 3-0 L27IN ABSRB UD L26MM SH 1/2 CIR J416H

## (undated) DEVICE — DRAPE,U/ SHT,SPLIT,PLAS,STERIL: Brand: MEDLINE

## (undated) DEVICE — TUBE PORT-A-CUL SWAB 11ML ST

## (undated) DEVICE — BNDG CMPR ELAS KNT VEL STD 4IN -- MEDICHOICE

## (undated) DEVICE — STOCKINETTE IMPERV REG 9X48IN --

## (undated) DEVICE — SOL IRR SOD CL 0.9% 3000ML --

## (undated) DEVICE — TOWEL SURG W16XL26IN BLU NONFENESTRATED DLX ST 2 PER PK

## (undated) DEVICE — BANDAGE COMPR 9 FTX4 IN SMOOTH COMFORTABLE SYNTH ESMRK LF

## (undated) DEVICE — BANDAGE ROLL,100% COTTON, 8 PLY, LARGE: Brand: KERLIX

## (undated) DEVICE — HANDPIECE SET WITH SOFT TISSUE TIP AND SUCTION TUBE: Brand: INTERPULSE

## (undated) DEVICE — KIT PROC EXTRM HND FT CUST LF --

## (undated) DEVICE — SYR 10ML LUER LOK 1/5ML GRAD --

## (undated) DEVICE — TRNQT RMFG CUFF SGL BLDR SGL P -- LAWSON OEM ITEM 338161

## (undated) DEVICE — OCCLUSIVE GAUZE STRIP,3% BISMUTH TRIBROMOPHENATE IN PETROLATUM BLEND: Brand: XEROFORM

## (undated) DEVICE — CTRL TOX URIN LIQCK NEG LEV --

## (undated) DEVICE — KERLIX BANDAGE ROLL: Brand: KERLIX

## (undated) DEVICE — STERILE POLYISOPRENE POWDER-FREE SURGICAL GLOVES WITH EMOLLIENT COATING: Brand: PROTEXIS

## (undated) DEVICE — COVER LT HNDL BLU PLAS